# Patient Record
Sex: MALE | Race: WHITE | Employment: OTHER | URBAN - METROPOLITAN AREA
[De-identification: names, ages, dates, MRNs, and addresses within clinical notes are randomized per-mention and may not be internally consistent; named-entity substitution may affect disease eponyms.]

---

## 2017-02-15 PROBLEM — J44.1 COPD EXACERBATION (HCC): Status: ACTIVE | Noted: 2017-02-15

## 2017-06-06 ENCOUNTER — OFFICE VISIT (OUTPATIENT)
Dept: PULMONOLOGY | Age: 62
End: 2017-06-06
Payer: COMMERCIAL

## 2017-06-06 VITALS
HEART RATE: 82 BPM | OXYGEN SATURATION: 96 % | BODY MASS INDEX: 27.4 KG/M2 | HEIGHT: 69 IN | RESPIRATION RATE: 14 BRPM | SYSTOLIC BLOOD PRESSURE: 147 MMHG | WEIGHT: 185 LBS | DIASTOLIC BLOOD PRESSURE: 93 MMHG | TEMPERATURE: 97.2 F

## 2017-06-06 DIAGNOSIS — R91.1 LUNG NODULE: ICD-10-CM

## 2017-06-06 DIAGNOSIS — R06.09 DYSPNEA ON EXERTION: ICD-10-CM

## 2017-06-06 DIAGNOSIS — J30.9 ALLERGIC RHINITIS, UNSPECIFIED ALLERGIC RHINITIS TRIGGER, UNSPECIFIED RHINITIS SEASONALITY: ICD-10-CM

## 2017-06-06 DIAGNOSIS — J43.2 CENTRILOBULAR EMPHYSEMA (HCC): Primary | ICD-10-CM

## 2017-06-06 PROCEDURE — G8427 DOCREV CUR MEDS BY ELIG CLIN: HCPCS | Performed by: INTERNAL MEDICINE

## 2017-06-06 PROCEDURE — 99204 OFFICE O/P NEW MOD 45 MIN: CPT | Performed by: INTERNAL MEDICINE

## 2017-06-06 PROCEDURE — G8926 SPIRO NO PERF OR DOC: HCPCS | Performed by: INTERNAL MEDICINE

## 2017-06-06 PROCEDURE — 3023F SPIROM DOC REV: CPT | Performed by: INTERNAL MEDICINE

## 2017-06-06 PROCEDURE — 3017F COLORECTAL CA SCREEN DOC REV: CPT | Performed by: INTERNAL MEDICINE

## 2017-06-06 PROCEDURE — 1036F TOBACCO NON-USER: CPT | Performed by: INTERNAL MEDICINE

## 2017-06-06 PROCEDURE — G8419 CALC BMI OUT NRM PARAM NOF/U: HCPCS | Performed by: INTERNAL MEDICINE

## 2017-06-06 PROCEDURE — G8598 ASA/ANTIPLAT THER USED: HCPCS | Performed by: INTERNAL MEDICINE

## 2017-06-06 RX ORDER — FLUTICASONE PROPIONATE 50 MCG
2 SPRAY, SUSPENSION (ML) NASAL DAILY
Qty: 1 BOTTLE | Refills: 11 | Status: SHIPPED | OUTPATIENT
Start: 2017-06-06 | End: 2017-10-13

## 2017-06-06 RX ORDER — FLUTICASONE PROPIONATE 110 UG/1
2 AEROSOL, METERED RESPIRATORY (INHALATION) 2 TIMES DAILY
Qty: 1 INHALER | Refills: 11 | Status: SHIPPED | OUTPATIENT
Start: 2017-06-06 | End: 2017-06-12 | Stop reason: CLARIF

## 2017-06-06 RX ORDER — DILTIAZEM HYDROCHLORIDE 120 MG/1
240 CAPSULE, COATED, EXTENDED RELEASE ORAL DAILY
COMMUNITY
Start: 2016-10-07

## 2017-06-06 RX ORDER — PRASUGREL 10 MG/1
1 TABLET, FILM COATED ORAL DAILY
COMMUNITY
End: 2017-06-06 | Stop reason: SDUPTHER

## 2017-06-06 RX ORDER — ROSUVASTATIN CALCIUM 5 MG/1
1 TABLET, COATED ORAL DAILY
COMMUNITY
End: 2017-06-06 | Stop reason: SDUPTHER

## 2017-06-12 ENCOUNTER — TELEPHONE (OUTPATIENT)
Dept: PULMONOLOGY | Age: 62
End: 2017-06-12

## 2017-06-21 ENCOUNTER — HOSPITAL ENCOUNTER (OUTPATIENT)
Dept: CT IMAGING | Age: 62
Discharge: HOME | End: 2017-06-21
Payer: COMMERCIAL

## 2017-06-21 DIAGNOSIS — R91.1 LUNG NODULE: ICD-10-CM

## 2017-06-21 PROCEDURE — 71250 CT THORAX DX C-: CPT

## 2017-08-01 ENCOUNTER — OFFICE VISIT (OUTPATIENT)
Dept: PULMONOLOGY | Age: 62
End: 2017-08-01
Payer: COMMERCIAL

## 2017-08-01 VITALS
SYSTOLIC BLOOD PRESSURE: 153 MMHG | HEIGHT: 69 IN | WEIGHT: 188 LBS | BODY MASS INDEX: 27.85 KG/M2 | DIASTOLIC BLOOD PRESSURE: 97 MMHG | HEART RATE: 67 BPM | TEMPERATURE: 97.1 F | OXYGEN SATURATION: 98 % | RESPIRATION RATE: 16 BRPM

## 2017-08-01 DIAGNOSIS — R06.09 DYSPNEA ON EXERTION: ICD-10-CM

## 2017-08-01 DIAGNOSIS — R91.8 ABNORMAL CT SCAN, LUNG: ICD-10-CM

## 2017-08-01 DIAGNOSIS — J30.89 NON-SEASONAL ALLERGIC RHINITIS, UNSPECIFIED ALLERGIC RHINITIS TRIGGER: ICD-10-CM

## 2017-08-01 DIAGNOSIS — J43.2 CENTRILOBULAR EMPHYSEMA (HCC): Primary | ICD-10-CM

## 2017-08-01 PROCEDURE — 99214 OFFICE O/P EST MOD 30 MIN: CPT | Performed by: INTERNAL MEDICINE

## 2017-08-01 PROCEDURE — 3017F COLORECTAL CA SCREEN DOC REV: CPT | Performed by: INTERNAL MEDICINE

## 2017-08-01 PROCEDURE — G8926 SPIRO NO PERF OR DOC: HCPCS | Performed by: INTERNAL MEDICINE

## 2017-08-01 PROCEDURE — G8427 DOCREV CUR MEDS BY ELIG CLIN: HCPCS | Performed by: INTERNAL MEDICINE

## 2017-08-01 PROCEDURE — 1036F TOBACCO NON-USER: CPT | Performed by: INTERNAL MEDICINE

## 2017-08-01 PROCEDURE — G8419 CALC BMI OUT NRM PARAM NOF/U: HCPCS | Performed by: INTERNAL MEDICINE

## 2017-08-01 PROCEDURE — G8598 ASA/ANTIPLAT THER USED: HCPCS | Performed by: INTERNAL MEDICINE

## 2017-08-01 PROCEDURE — 3023F SPIROM DOC REV: CPT | Performed by: INTERNAL MEDICINE

## 2017-10-13 RX ORDER — FLUTICASONE PROPIONATE 50 MCG
2 SPRAY, SUSPENSION (ML) NASAL DAILY
Qty: 3 BOTTLE | Refills: 3 | OUTPATIENT
Start: 2017-10-13 | End: 2019-07-02

## 2017-10-13 NOTE — TELEPHONE ENCOUNTER
CANCELLED REFILLS AT [address] [place] [address] [facility] IS CALLING FOR REFILLS ON THIS MEDICATION AND THEY DO NOT DO TRANSFERS.  NEED NEW SCRIPT

## 2018-02-06 ENCOUNTER — TELEPHONE (OUTPATIENT)
Dept: PULMONOLOGY | Age: 63
End: 2018-02-06

## 2018-02-06 ENCOUNTER — OFFICE VISIT (OUTPATIENT)
Dept: PULMONOLOGY | Age: 63
End: 2018-02-06
Payer: COMMERCIAL

## 2018-02-06 VITALS
OXYGEN SATURATION: 97 % | TEMPERATURE: 97.3 F | RESPIRATION RATE: 18 BRPM | HEART RATE: 66 BPM | DIASTOLIC BLOOD PRESSURE: 98 MMHG | SYSTOLIC BLOOD PRESSURE: 144 MMHG | BODY MASS INDEX: 28.44 KG/M2 | WEIGHT: 192 LBS | HEIGHT: 69 IN

## 2018-02-06 DIAGNOSIS — F17.219 NICOTINE DEPENDENCE, CIGARETTES, WITH UNSPECIFIED NICOTINE-INDUCED DISORDERS: ICD-10-CM

## 2018-02-06 DIAGNOSIS — F17.211 NICOTINE DEPENDENCE, CIGARETTES, IN REMISSION: ICD-10-CM

## 2018-02-06 DIAGNOSIS — J43.2 CENTRILOBULAR EMPHYSEMA (HCC): Primary | ICD-10-CM

## 2018-02-06 DIAGNOSIS — R06.09 DYSPNEA ON EXERTION: ICD-10-CM

## 2018-02-06 DIAGNOSIS — R91.1 LUNG NODULE: ICD-10-CM

## 2018-02-06 PROCEDURE — G8926 SPIRO NO PERF OR DOC: HCPCS | Performed by: INTERNAL MEDICINE

## 2018-02-06 PROCEDURE — 1036F TOBACCO NON-USER: CPT | Performed by: INTERNAL MEDICINE

## 2018-02-06 PROCEDURE — 3017F COLORECTAL CA SCREEN DOC REV: CPT | Performed by: INTERNAL MEDICINE

## 2018-02-06 PROCEDURE — G8419 CALC BMI OUT NRM PARAM NOF/U: HCPCS | Performed by: INTERNAL MEDICINE

## 2018-02-06 PROCEDURE — G8598 ASA/ANTIPLAT THER USED: HCPCS | Performed by: INTERNAL MEDICINE

## 2018-02-06 PROCEDURE — 99214 OFFICE O/P EST MOD 30 MIN: CPT | Performed by: INTERNAL MEDICINE

## 2018-02-06 PROCEDURE — G0296 VISIT TO DETERM LDCT ELIG: HCPCS | Performed by: INTERNAL MEDICINE

## 2018-02-06 PROCEDURE — G8427 DOCREV CUR MEDS BY ELIG CLIN: HCPCS | Performed by: INTERNAL MEDICINE

## 2018-02-06 PROCEDURE — 3023F SPIROM DOC REV: CPT | Performed by: INTERNAL MEDICINE

## 2018-02-06 PROCEDURE — G8484 FLU IMMUNIZE NO ADMIN: HCPCS | Performed by: INTERNAL MEDICINE

## 2018-02-06 NOTE — PROGRESS NOTES
Laterality Date    CARDIAC CATHETERIZATION  2011, 2013    CORONARY ANGIOPLASTY WITH STENT PLACEMENT  2011    x2     CORONARY ANGIOPLASTY WITH STENT PLACEMENT  5/2013    x1    HERNIA REPAIR         Allergies: Allergies   Allergen Reactions    Pcn [Penicillins]          Home Meds:   Outpatient Encounter Prescriptions as of 2/6/2018   Medication Sig Dispense Refill    mometasone (ASMANEX HFA) 100 MCG/ACT AERO inhaler Inhale 2 puffs into the lungs 2 times daily 3 Inhaler 3    umeclidinium-vilanterol (ANORO ELLIPTA) 62.5-25 MCG/INH AEPB inhaler Inhale 1 puff into the lungs daily 3 each 3    diltiazem (CARTIA XT) 120 MG extended release capsule Take 1 tablet by mouth daily      rosuvastatin (CRESTOR) 10 MG tablet Take 10 mg by mouth daily      Probiotic Product (FORTIFY DAILY PROBIOTIC) CAPS Take 1 capsule by mouth daily      albuterol sulfate HFA (PROVENTIL HFA) 108 (90 BASE) MCG/ACT inhaler Inhale 2 puffs into the lungs every 6 hours as needed for Wheezing or Shortness of Breath 1 Inhaler 0    prasugrel (EFFIENT) 10 MG TABS Take 10 mg by mouth daily      Flaxseed, Linseed, (FLAXSEED OIL) 1000 MG CAPS Take 1 capsule by mouth daily       aspirin 81 MG tablet Take 81 mg by mouth daily.  fluticasone (FLONASE) 50 MCG/ACT nasal spray 2 sprays by Nasal route daily 3 Bottle 3    [DISCONTINUED] carvedilol (COREG) 3.125 MG tablet Take 1 tablet by mouth 2 times daily (with meals) 60 tablet 3     No facility-administered encounter medications on file as of 2/6/2018. Social History:   TOBACCO:   reports that he quit smoking in February 2017. 2 ppd for 45 years and then 1 ppd. He has never used smokeless tobacco.  ETOH:   reports that he does not drink alcohol.   OCCUPATION:      Family History:       Problem Relation Age of Onset    Heart Defect Father        Immunizations:    Immunization History   Administered Date(s) Administered    Influenza, Trivalent Vaccine 3 Years and above, IM, PF 02/16/2017  Pneumococcal 13-valent Conjugate ([id number]) 02/16/2017         REVIEW OF SYSTEMS:  CONSTITUTIONAL:  negative for  fevers, chills, sweats, fatigue, malaise, anorexia and weight loss  EYES:  negative for  double vision, blurred vision, visual disturbance and redness  HEENT:  negative for  hearing loss, tinnitus, ear drainage, epistaxis, snoring, sore mouth, hoarseness and voice change  RESPIRATORY:  Positive for dyspnea on exertion  negative for  wheezing, dry cough, cough with sputum, hemoptysis, chest pain, pleuritic pain and cyanosis  CARDIOVASCULAR:  positive for  dyspnea on exertion  negative for  chest pain, palpitations, orthopnea, PND, exertional chest pressure/discomfort, fatigue, early saiety, edema, syncope  GASTROINTESTINAL:  negative for nausea, vomiting, change in bowel habits, diarrhea, constipation, abdominal pain, abdominal distention, jaundice, dysphagia, reflux, regurgitation, odynophagia, hematemesis and hemtochezia  GENITOURINARY:  negative for frequency, dysuria, nocturia, urinary incontinence, hesitancy, decreased stream and hematuria  HEMATOLOGIC/LYMPHATIC:  negative for easy bruising, bleeding, lymphadenopathy and petechiae  ALLERGIC/IMMUNOLOGIC:  negative for recurrent infections, urticaria, hay fever, angioedema, anaphylaxis and drug reactions  ENDOCRINE:  negative for heat intolerance, cold intolerance, tremor, weight changes and change in bowel habits  MUSCULOSKELETAL:  Posterior for right heel pain, negative for  myalgias, arthralgias, pain, joint swelling, decreased range of motion and muscle weakness  NEUROLOGICAL:  negative for headaches, dizziness, seizures, memory problems, speech problems, visual disturbance, gait problems, tremor, dysphagia, weakness, numbness, syncope, near syncope and tingling  BEHAVIOR/PSYCH:  negative          Physical Exam:    Vitals: BP (!) 144/98 (Site: Left Arm, Position: Sitting)   Pulse 66   Temp 97.3 °F (36.3 °C)   Resp 18   Ht 5' 9\" (1.753 m) Final   02/15/2017 134 (L) 135 - 144 mmol/L Final     Potassium   Date Value Ref Range Status   02/17/2017 4.1 3.7 - 5.3 mmol/L Final   02/16/2017 4.1 3.7 - 5.3 mmol/L Final   02/15/2017 3.6 (L) 3.7 - 5.3 mmol/L Final     Chloride   Date Value Ref Range Status   02/17/2017 102 98 - 107 mmol/L Final   02/16/2017 99 98 - 107 mmol/L Final   02/15/2017 91 (L) 98 - 107 mmol/L Final     CO2   Date Value Ref Range Status   02/17/2017 26 20 - 31 mmol/L Final   02/16/2017 25 20 - 31 mmol/L Final   02/15/2017 23 20 - 31 mmol/L Final     BUN   Date Value Ref Range Status   02/17/2017 16 6 - 20 mg/dL Final   02/16/2017 11 6 - 20 mg/dL Final   02/15/2017 12 6 - 20 mg/dL Final     CREATININE   Date Value Ref Range Status   02/17/2017 0.76 0.70 - 1.20 mg/dL Final   02/16/2017 0.70 0.70 - 1.20 mg/dL Final   02/15/2017 0.76 0.70 - 1.20 mg/dL Final     Glucose   Date Value Ref Range Status   02/17/2017 125 (H) 70 - 99 mg/dL Final   02/16/2017 150 (H) 70 - 99 mg/dL Final   02/15/2017 166 (H) 70 - 99 mg/dL Final     Hepatic:   AST   Date Value Ref Range Status   10/23/2014 12 <40 U/L Final     Comment:     Performed at [address] [place] [address] [phone]   06/17/2014 16 <40 U/L Final     Comment:     Performed at [address] [place] [address] 3 [phone]   03/14/2014 16 8 - 36 U/L Final     Comment:     Performed at [facility] [address] 3 [phone]     ALT   Date Value Ref Range Status   10/23/2014 18 5 - 41 U/L Final     Comment:     Performed at [address] [place] [address] [phone]   06/17/2014 16 5 - 41 U/L Final     Comment:     Performed at [address] [place] [address] 3 [phone]   03/14/2014 27 4 - 40 U/L Final     Comment:     Performed at [address] [place] [address] 3 [phone]     Amylase: No results found for: AMYLASE  Lipase: No results found for:

## 2018-02-07 ENCOUNTER — TELEPHONE (OUTPATIENT)
Dept: ONCOLOGY | Age: 63
End: 2018-02-07

## 2018-06-19 RX ORDER — MOMETASONE FUROATE 100 UG/1
AEROSOL RESPIRATORY (INHALATION)
Qty: 3 INHALER | Refills: 0 | Status: SHIPPED | OUTPATIENT
Start: 2018-06-19 | End: 2018-08-07 | Stop reason: SDUPTHER

## 2018-06-26 ENCOUNTER — HOSPITAL ENCOUNTER (OUTPATIENT)
Dept: CT IMAGING | Age: 63
Discharge: HOME | End: 2018-06-28
Payer: COMMERCIAL

## 2018-06-26 PROCEDURE — G0297 LDCT FOR LUNG CA SCREEN: HCPCS

## 2018-06-29 ENCOUNTER — TELEPHONE (OUTPATIENT)
Dept: CASE MANAGEMENT | Age: 63
End: 2018-06-29

## 2018-07-23 RX ORDER — UMECLIDINIUM BROMIDE AND VILANTEROL TRIFENATATE 62.5; 25 UG/1; UG/1
POWDER RESPIRATORY (INHALATION)
Qty: 180 EACH | Refills: 3 | Status: SHIPPED | OUTPATIENT
Start: 2018-07-23 | End: 2019-12-11 | Stop reason: SDUPTHER

## 2018-08-07 ENCOUNTER — OFFICE VISIT (OUTPATIENT)
Dept: PULMONOLOGY | Age: 63
End: 2018-08-07
Payer: COMMERCIAL

## 2018-08-07 VITALS
HEIGHT: 69 IN | HEART RATE: 73 BPM | SYSTOLIC BLOOD PRESSURE: 143 MMHG | BODY MASS INDEX: 26.81 KG/M2 | RESPIRATION RATE: 14 BRPM | WEIGHT: 181 LBS | DIASTOLIC BLOOD PRESSURE: 90 MMHG | TEMPERATURE: 98 F

## 2018-08-07 DIAGNOSIS — R91.1 LUNG NODULE: ICD-10-CM

## 2018-08-07 DIAGNOSIS — J43.2 CENTRILOBULAR EMPHYSEMA (HCC): Primary | ICD-10-CM

## 2018-08-07 DIAGNOSIS — R06.09 DYSPNEA ON EXERTION: ICD-10-CM

## 2018-08-07 PROCEDURE — G8427 DOCREV CUR MEDS BY ELIG CLIN: HCPCS | Performed by: INTERNAL MEDICINE

## 2018-08-07 PROCEDURE — G8598 ASA/ANTIPLAT THER USED: HCPCS | Performed by: INTERNAL MEDICINE

## 2018-08-07 PROCEDURE — 99214 OFFICE O/P EST MOD 30 MIN: CPT | Performed by: INTERNAL MEDICINE

## 2018-08-07 PROCEDURE — 3017F COLORECTAL CA SCREEN DOC REV: CPT | Performed by: INTERNAL MEDICINE

## 2018-08-07 PROCEDURE — G8926 SPIRO NO PERF OR DOC: HCPCS | Performed by: INTERNAL MEDICINE

## 2018-08-07 PROCEDURE — 3023F SPIROM DOC REV: CPT | Performed by: INTERNAL MEDICINE

## 2018-08-07 PROCEDURE — 1036F TOBACCO NON-USER: CPT | Performed by: INTERNAL MEDICINE

## 2018-08-07 PROCEDURE — G8419 CALC BMI OUT NRM PARAM NOF/U: HCPCS | Performed by: INTERNAL MEDICINE

## 2018-08-07 NOTE — PROGRESS NOTES
ANGIOPLASTY WITH STENT PLACEMENT  2011    x2     CORONARY ANGIOPLASTY WITH STENT PLACEMENT  5/2013    x1    HERNIA REPAIR         Allergies: Allergies   Allergen Reactions    Pcn [Penicillins]          Home Meds:   Outpatient Encounter Prescriptions as of 8/7/2018   Medication Sig Dispense Refill    ANORO ELLIPTA 62.5-25 MCG/INH AEPB inhaler USE 1 INHALATION DAILY 180 each 3    ASMANEX  MCG/ACT AERO inhaler USE 2 INHALATIONS TWICE A DAY 3 Inhaler 0    diltiazem (CARTIA XT) 120 MG extended release capsule Take 1 tablet by mouth daily      rosuvastatin (CRESTOR) 10 MG tablet Take 10 mg by mouth daily      albuterol sulfate HFA (PROVENTIL HFA) 108 (90 BASE) MCG/ACT inhaler Inhale 2 puffs into the lungs every 6 hours as needed for Wheezing or Shortness of Breath 1 Inhaler 0    prasugrel (EFFIENT) 10 MG TABS Take 10 mg by mouth daily      Flaxseed, Linseed, (FLAXSEED OIL) 1000 MG CAPS Take 1 capsule by mouth daily       aspirin 81 MG tablet Take 81 mg by mouth daily.  fluticasone (FLONASE) 50 MCG/ACT nasal spray 2 sprays by Nasal route daily 3 Bottle 3    Probiotic Product (FORTIFY DAILY PROBIOTIC) CAPS Take 1 capsule by mouth daily       No facility-administered encounter medications on file as of 8/7/2018. Social History:   TOBACCO:   reports that he quit smoking in February 2017. 2 ppd for 45 years and then 1 ppd. He has never used smokeless tobacco.  ETOH:   reports that he does not drink alcohol.   OCCUPATION:      Family History:   Family History   Problem Relation Age of Onset    Heart Defect Father        Immunizations:    Immunization History   Administered Date(s) Administered    Influenza, Trivalent Vaccine 3 Years and above, IM, PF 02/16/2017    Pneumococcal 13-valent Conjugate ([id number]) 02/16/2017         REVIEW OF SYSTEMS:  CONSTITUTIONAL:  negative for  fevers, chills, sweats, fatigue, malaise, anorexia and weight loss  EYES:  negative for  double vision, blurred kg/m².     Physical Examination:   General appearance - alert, well appearing, and in no distress and normal appearing weight  Mental status - alert, oriented to person, place, and time  Eyes - sclera anicteric, left eye normal, right eye normal  Ears - right ear normal, left ear normal  Nose - normal and patent, no erythema, discharge or polyps  Mouth - mucous membranes moist, pharynx normal without lesions  Neck - supple, no significant adenopathy  Chest - no tachypnea, retractions or cyanosis, Increase resonance on percussion bilaterally, no wheezing noted, decreased air entry noted and distant breath sounds bilaterally, no rales  Heart - normal rate, regular rhythm, normal S1, S2, no murmurs, rubs, clicks or gallops  Abdomen - soft, nontender, nondistended, no masses or organomegaly  Neurological - alert, oriented, normal speech, no focal findings or movement disorder noted  Extremities - peripheral pulses normal, no pedal edema, no clubbing or cyanosis  Skin - normal coloration and turgor, no rashes, no suspicious skin lesions noted       LABS:    CBC:   WBC   Date Value Ref Range Status   02/17/2017 16.2 (H) 3.5 - 11.0 k/uL Final   02/16/2017 12.0 (H) 3.5 - 11.0 k/uL Final   02/15/2017 14.1 (H) 3.5 - 11.0 k/uL Final     Hemoglobin   Date Value Ref Range Status   02/17/2017 12.8 (L) 13.5 - 17.5 g/dL Final   02/16/2017 13.0 (L) 13.5 - 17.5 g/dL Final   02/15/2017 13.8 13.5 - 17.5 g/dL Final     Platelets   Date Value Ref Range Status   02/17/2017 444 (H) 130 - 400 k/uL Final   02/16/2017 441 (H) 130 - 400 k/uL Final   02/15/2017 415 (H) 130 - 400 k/uL Final     BMP:   Sodium   Date Value Ref Range Status   02/17/2017 139 135 - 144 mmol/L Final   02/16/2017 135 135 - 144 mmol/L Final   02/15/2017 134 (L) 135 - 144 mmol/L Final     Potassium   Date Value Ref Range Status   02/17/2017 4.1 3.7 - 5.3 mmol/L Final   02/16/2017 4.1 3.7 - 5.3 mmol/L Final   02/15/2017 3.6 (L) 3.7 - 5.3 mmol/L Final     Chloride   Date measuring 6-7 mm on image 122 of the        axial images.       Lungs and pleural spaces.       Severe upper lung predominant emphysematous changes with mild central        and lower lung bronchiectasis. EKG: . ECHO:     Immunization History   Administered Date(s) Administered    Influenza, Trivalent Vaccine 3 Years and above, IM, PF 02/16/2017    Pneumococcal 13-valent Conjugate (Lindsay Perry) 02/16/2017       Assessment and Plan       ICD-10-CM ICD-9-CM    1. Centrilobular emphysema (HCC) J43.2 492.8    2. Dyspnea on exertion R06.09 786.09    3. Lung nodule R91.1 793.11        Patient Active Problem List   Diagnosis    CAD (coronary artery disease)    Status post coronary artery stent placement    Essential hypertension    Hyperlipidemia    [facility]ierrez       Plan and Recommendations:    Continue Anoro once daily  Albuterol as needed  Flonase nasal spray to continue as needed  Asmanex mcg 2 puffs bid    Will need yearly Low dose CT scan chest next in June 2019. Annual flu vaccine  Uptodate on Flu and Pneumonia vaccine prevnar 13    Maintain an active lifestyle   Smoking cessation to continue  Follow up in 6 months     Questions answered to pt's satisfaction          It was my pleasure to evaluate Khan Will today. Please call with questions. Rosanna Rouse MD             8/7/2018, 10:10 AM       Please note that this chart was generated using voice recognition Dragon dictation software. Although every effort was made to ensure the accuracy of this automated transcription, some errors in transcription may have occurred.

## 2019-02-05 ENCOUNTER — OFFICE VISIT (OUTPATIENT)
Dept: PULMONOLOGY | Age: 64
End: 2019-02-05
Payer: COMMERCIAL

## 2019-02-05 VITALS
SYSTOLIC BLOOD PRESSURE: 147 MMHG | WEIGHT: 192 LBS | TEMPERATURE: 97.2 F | RESPIRATION RATE: 14 BRPM | HEART RATE: 68 BPM | BODY MASS INDEX: 28.44 KG/M2 | DIASTOLIC BLOOD PRESSURE: 94 MMHG | HEIGHT: 69 IN | OXYGEN SATURATION: 96 %

## 2019-02-05 DIAGNOSIS — J43.2 CENTRILOBULAR EMPHYSEMA (HCC): Primary | ICD-10-CM

## 2019-02-05 DIAGNOSIS — R06.09 DYSPNEA ON EXERTION: ICD-10-CM

## 2019-02-05 DIAGNOSIS — R91.1 LUNG NODULE: ICD-10-CM

## 2019-02-05 PROCEDURE — 99214 OFFICE O/P EST MOD 30 MIN: CPT | Performed by: INTERNAL MEDICINE

## 2019-03-11 DIAGNOSIS — J43.2 CENTRILOBULAR EMPHYSEMA (HCC): ICD-10-CM

## 2019-03-12 DIAGNOSIS — J43.2 CENTRILOBULAR EMPHYSEMA (HCC): ICD-10-CM

## 2019-03-20 ENCOUNTER — OFFICE VISIT (OUTPATIENT)
Dept: PULMONOLOGY | Age: 64
End: 2019-03-20
Payer: COMMERCIAL

## 2019-03-20 VITALS
BODY MASS INDEX: 28.14 KG/M2 | HEIGHT: 69 IN | SYSTOLIC BLOOD PRESSURE: 156 MMHG | RESPIRATION RATE: 16 BRPM | WEIGHT: 190 LBS | DIASTOLIC BLOOD PRESSURE: 99 MMHG | HEART RATE: 67 BPM | OXYGEN SATURATION: 97 %

## 2019-03-20 VITALS — HEIGHT: 69 IN | BODY MASS INDEX: 28.14 KG/M2 | OXYGEN SATURATION: 98 % | HEART RATE: 74 BPM | WEIGHT: 190 LBS

## 2019-03-20 DIAGNOSIS — J43.1 PANLOBULAR EMPHYSEMA (HCC): ICD-10-CM

## 2019-03-20 DIAGNOSIS — J44.9 CHRONIC OBSTRUCTIVE PULMONARY DISEASE, UNSPECIFIED COPD TYPE (HCC): Primary | ICD-10-CM

## 2019-03-20 DIAGNOSIS — R91.8 LUNG NODULES: Primary | ICD-10-CM

## 2019-03-20 DIAGNOSIS — J44.9 COPD, VERY SEVERE (HCC): ICD-10-CM

## 2019-03-20 PROCEDURE — 94010 BREATHING CAPACITY TEST: CPT | Performed by: INTERNAL MEDICINE

## 2019-03-20 PROCEDURE — 94729 DIFFUSING CAPACITY: CPT | Performed by: INTERNAL MEDICINE

## 2019-03-20 PROCEDURE — 94726 PLETHYSMOGRAPHY LUNG VOLUMES: CPT | Performed by: INTERNAL MEDICINE

## 2019-03-20 PROCEDURE — 99215 OFFICE O/P EST HI 40 MIN: CPT | Performed by: INTERNAL MEDICINE

## 2019-03-20 RX ORDER — LEVOFLOXACIN 500 MG/1
500 TABLET, FILM COATED ORAL DAILY
Qty: 7 TABLET | Refills: 0 | Status: SHIPPED | OUTPATIENT
Start: 2019-03-20 | End: 2019-03-27

## 2019-03-20 ASSESSMENT — SLEEP AND FATIGUE QUESTIONNAIRES
HOW LIKELY ARE YOU TO NOD OFF OR FALL ASLEEP WHEN YOU ARE A PASSENGER IN A CAR FOR AN HOUR WITHOUT A BREAK: 0
HOW LIKELY ARE YOU TO NOD OFF OR FALL ASLEEP WHILE SITTING AND READING: 0
HOW LIKELY ARE YOU TO NOD OFF OR FALL ASLEEP WHILE SITTING QUIETLY AFTER LUNCH WITHOUT ALCOHOL: 0
HOW LIKELY ARE YOU TO NOD OFF OR FALL ASLEEP WHILE WATCHING TV: 0
HOW LIKELY ARE YOU TO NOD OFF OR FALL ASLEEP WHILE LYING DOWN TO REST IN THE AFTERNOON WHEN CIRCUMSTANCES PERMIT: 3
HOW LIKELY ARE YOU TO NOD OFF OR FALL ASLEEP WHILE SITTING INACTIVE IN A PUBLIC PLACE: 0
HOW LIKELY ARE YOU TO NOD OFF OR FALL ASLEEP WHILE SITTING AND TALKING TO SOMEONE: 0
ESS TOTAL SCORE: 3
HOW LIKELY ARE YOU TO NOD OFF OR FALL ASLEEP IN A CAR, WHILE STOPPED FOR A FEW MINUTES IN TRAFFIC: 0

## 2019-04-03 DIAGNOSIS — R91.8 LUNG NODULES: ICD-10-CM

## 2019-04-09 ENCOUNTER — OFFICE VISIT (OUTPATIENT)
Dept: PULMONOLOGY | Age: 64
End: 2019-04-09
Payer: COMMERCIAL

## 2019-04-09 VITALS
RESPIRATION RATE: 16 BRPM | WEIGHT: 188 LBS | HEIGHT: 69 IN | HEART RATE: 59 BPM | SYSTOLIC BLOOD PRESSURE: 129 MMHG | BODY MASS INDEX: 27.85 KG/M2 | OXYGEN SATURATION: 98 % | DIASTOLIC BLOOD PRESSURE: 87 MMHG

## 2019-04-09 DIAGNOSIS — R91.1 LUNG NODULE: ICD-10-CM

## 2019-04-09 DIAGNOSIS — J43.2 CENTRILOBULAR EMPHYSEMA (HCC): Primary | ICD-10-CM

## 2019-04-09 PROCEDURE — 99215 OFFICE O/P EST HI 40 MIN: CPT | Performed by: INTERNAL MEDICINE

## 2019-04-09 NOTE — PROGRESS NOTES
OUTPATIENT PULMONARY PROGRESS NOTE      Patient:  Marguerite Ac  MRN: [id number]    Consulting Physician: Dr Crystal Parra  Reason for Consult: Dyspnea, COPD  Primacy Care Physician: Kendall Myers MD    HISTORY OF PRESENT ILLNESS:   The patient is a 60 y.o. male   He is here for follow-up today, he has history of severe  COPD/Emphysema and history of lung nodules/scarring which was stable for 2 years on CT scan. He had last screening CT scan done in June 2018 which showed stable 3 mm left lower lobe nodule, on his visit about 6 weeks ago a high-resolution CT chest was ordered for evaluation of lung volume reduction surgery and/or transplant evaluation and also pulmonary function test and alpha 1 antitrypsin phenotype was ordered. High-resolution CT of the chest on 03/07/19 shows right lower lobe area of nodular infiltrate 1.2 X 1.2 cm along with some bronchiectasis in that area, left upper lobe small nodule 6 mm and in the right lower lobe posterior medial nodule 4 mm which apparently was not seen on the previous CT scan. He was seen 2 weeks ago and a PET scan was ordered understanding that left upper lobe nodule was too small and could be false negative but it was not present on CT scan in June 2018 about 10 months ago and also he had a right lower lobe large area of infiltrate/atelectasis and he is here for follow-up today. PET scan was done on 04/02/19 which showed the right lower lobe area of infiltrate atelectasis is smaller and no uptake there as it is decreasing in size and not FDG avid suggestive of inflammatory origin, the left upper lobe nodule shows slight uptake although uptake was low but considering the small size of the nodule (6 mm) it was still considered concerning.     According to patient around the time he had the CT scan done on March 7 he was having upper respiratory/cold-like symptoms, his wife was sick at that time and he was having cold congestion with cough without wheezing fever or chest tightness he did not take any antibiotics and he did not use any frequent inhaler and symptoms resolved, when he was seen last time and antibiotic was given. Alpha 1 antitrypsin phenotype is PI MM  Pulmonary function test done today shows severe obstructive impairment with air trapping and severe reduction in diffusion capacity. 6 minute walk test did not show excise-induced hypoxia did require long-term oxygen therapy. GALLARDO is stable without much change, can walk half a block or more with slow pace does get short of breath with walk fast.  Denies persistent or daily cough, no wheezing no orthopnea and paroxysmal nocturnal dyspnea  No nocturnal symptoms of cough wheezing chest tightness  Taking Anoro daily and taking Asmanex once daily instead of twice daily. Does not use any albuterol as he does not think albuterol helped him. No allergies except seasonal allergies and not taking Flonase nasal sprays. In last 2 years. No exacerbation and no hospitalization no use of prednisone since 2017. Initial history and evaluation  H/O COPD diagnosed since 2011 when he had also stent placed for CAD by Dr Niko Sarkar since 2011  Stopped smoking about a year ago and he had a smoke 2 pack per day for 49-50 years  H/O LL lung nodule and had ct scan in 2014, 2015 and 2016 in [facility]      Past Medical History:        Diagnosis Date    CAD (coronary artery disease) 2011    stents    COPD (chronic obstructive pulmonary disease) ([address]     COPD exacerbation ([address] 2/15/2017    [facility]     Examination of participant in clinical trial 5/20/13    6 year follow up, estimated completion JUN 2019    Hyperlipidemia     Hypokalemia        Past Surgical History:        Procedure Laterality Date    CARDIAC CATHETERIZATION  2011, 2013    CORONARY ANGIOPLASTY WITH STENT PLACEMENT  2011    x2     CORONARY ANGIOPLASTY WITH STENT PLACEMENT  5/2013    x1    HERNIA REPAIR         Allergies:     Allergies   Allergen Reactions    Pcn [Penicillins]          Home Meds:   Outpatient Encounter Medications as of 4/9/2019   Medication Sig Dispense Refill    mometasone (ASMANEX HFA) 100 MCG/ACT AERO inhaler Inhale 2 puffs into the lungs 2 times daily 3 Inhaler 3    ANORO ELLIPTA 62.5-25 MCG/INH AEPB inhaler USE 1 INHALATION DAILY 180 each 3    diltiazem (CARTIA XT) 120 MG extended release capsule Take 1 tablet by mouth daily      rosuvastatin (CRESTOR) 10 MG tablet Take 10 mg by mouth daily      Probiotic Product (FORTIFY DAILY PROBIOTIC) CAPS Take 1 capsule by mouth daily      albuterol sulfate HFA (PROVENTIL HFA) 108 (90 BASE) MCG/ACT inhaler Inhale 2 puffs into the lungs every 6 hours as needed for Wheezing or Shortness of Breath 1 Inhaler 0    prasugrel (EFFIENT) 10 MG TABS Take 10 mg by mouth daily      Flaxseed, Linseed, (FLAXSEED OIL) 1000 MG CAPS Take 1 capsule by mouth daily       aspirin 81 MG tablet Take 81 mg by mouth daily.  fluticasone (FLONASE) 50 MCG/ACT nasal spray 2 sprays by Nasal route daily 3 Bottle 3     No facility-administered encounter medications on file as of 4/9/2019. Social History:   TOBACCO:   reports that he quit smoking in February 2017. 2 ppd for 45 years and then 1 ppd. He has never used smokeless tobacco.  ETOH:   reports that he does not drink alcohol.   OCCUPATION:      Family History:       Problem Relation Age of Onset    Heart Defect Father        Immunizations:    Immunization History   Administered Date(s) Administered    Influenza, Triv, 3 Years and older, IM, PF (Afluria 5yrs and older) 02/16/2017    Pneumococcal 13-valent Conjugate ([id number]) 02/16/2017         REVIEW OF SYSTEMS:  CONSTITUTIONAL:  negative for  fevers, chills, sweats, fatigue, malaise, anorexia and weight loss  EYES:  negative for  double vision, blurred vision, visual disturbance and redness  HEENT:  negative for  hearing loss, tinnitus, ear drainage, epistaxis, snoring, sore mouth, hoarseness and voice change  RESPIRATORY:  Positive for dyspnea on exertion, negative for  wheezing, dry cough, cough with sputum, hemoptysis, chest pain, pleuritic pain and cyanosis  CARDIOVASCULAR:  positive for  dyspnea on exertion, negative for  chest pain, palpitations, orthopnea, PND, exertional chest pressure/discomfort, fatigue, early saiety, edema, syncope  GASTROINTESTINAL:  negative for nausea, vomiting, change in bowel habits, diarrhea, constipation, abdominal pain, abdominal distention, jaundice, dysphagia, reflux, regurgitation, odynophagia, hematemesis and hemtochezia  GENITOURINARY:  negative for frequency, dysuria, nocturia, urinary incontinence, hesitancy, decreased stream and hematuria  HEMATOLOGIC/LYMPHATIC:  negative for easy bruising, bleeding, lymphadenopathy and petechiae  ALLERGIC/IMMUNOLOGIC:  negative for recurrent infections, urticaria, hay fever, angioedema, anaphylaxis and drug reactions  ENDOCRINE:  negative for heat intolerance, cold intolerance, tremor, weight changes and change in bowel habits  MUSCULOSKELETAL:  Positive for toe pain, negative for  myalgias, arthralgias, pain, joint swelling, decreased range of motion and muscle weakness  NEUROLOGICAL:  negative for headaches, dizziness, seizures, memory problems, speech problems, visual disturbance, gait problems, tremor, dysphagia, weakness, numbness, syncope, near syncope and tingling  BEHAVIOR/PSYCH:  negative          Physical Exam:    Vitals: /87 (Site: Left Upper Arm)   Pulse 59   Resp 16   Ht 5' 9\" (1.753 m)   Wt 188 lb (85.3 kg)   SpO2 98% Comment: Room air at rest  BMI 27.76 kg/m²   Last 3 weights: Wt Readings from Last 3 Encounters:   04/09/19 188 lb (85.3 kg)   03/20/19 190 lb (86.2 kg)   03/20/19 190 lb (86.2 kg)     Body mass index is 27.76 kg/m².     Physical Examination:   General appearance - alert, well appearing, and in no distress and normal appearing weight  Mental status - alert, oriented to person, place, and time  Eyes - sclera anicteric, left eye normal, right eye normal  Ears - right ear normal, left ear normal  Nose - normal and patent, no erythema, discharge or polyps  Mouth - mucous membranes moist, pharynx normal without lesions  Neck - supple, no significant adenopathy  Chest - no tachypnea, retractions or cyanosis, Increase resonance on percussion bilaterally, no wheezing noted, decreased air entry noted and distant breath sounds bilaterally, no rales  Heart - normal rate, regular rhythm, normal S1, S2, no murmurs, rubs, clicks or gallops  Abdomen - soft, nontender, nondistended, no masses or organomegaly  Neurological - alert, oriented, normal speech, no focal findings or movement disorder noted  Extremities - peripheral pulses normal, no pedal edema, no clubbing or cyanosis  Skin - normal coloration and turgor, no rashes, no suspicious skin lesions noted       LABS:    CBC:   WBC   Date Value Ref Range Status   02/17/2017 16.2 (H) 3.5 - 11.0 k/uL Final   02/16/2017 12.0 (H) 3.5 - 11.0 k/uL Final   02/15/2017 14.1 (H) 3.5 - 11.0 k/uL Final     Hemoglobin   Date Value Ref Range Status   02/17/2017 12.8 (L) 13.5 - 17.5 g/dL Final   02/16/2017 13.0 (L) 13.5 - 17.5 g/dL Final   02/15/2017 13.8 13.5 - 17.5 g/dL Final     Platelets   Date Value Ref Range Status   02/17/2017 444 (H) 130 - 400 k/uL Final   02/16/2017 441 (H) 130 - 400 k/uL Final   02/15/2017 415 (H) 130 - 400 k/uL Final     BMP:   Sodium   Date Value Ref Range Status   02/17/2017 139 135 - 144 mmol/L Final   02/16/2017 135 135 - 144 mmol/L Final   02/15/2017 134 (L) 135 - 144 mmol/L Final     Potassium   Date Value Ref Range Status   02/17/2017 4.1 3.7 - 5.3 mmol/L Final   02/16/2017 4.1 3.7 - 5.3 mmol/L Final   02/15/2017 3.6 (L) 3.7 - 5.3 mmol/L Final     Chloride   Date Value Ref Range Status   02/17/2017 102 98 - 107 mmol/L Final   02/16/2017 99 98 - 107 mmol/L Final   02/15/2017 91 (L) 98 - 107 mmol/L Final     CO2   Date Value Ref Range Status 02/17/2017 26 20 - 31 mmol/L Final   02/16/2017 25 20 - 31 mmol/L Final   02/15/2017 23 20 - 31 mmol/L Final     BUN   Date Value Ref Range Status   02/17/2017 16 6 - 20 mg/dL Final   02/16/2017 11 6 - 20 mg/dL Final   02/15/2017 12 6 - 20 mg/dL Final     CREATININE   Date Value Ref Range Status   02/17/2017 0.76 0.70 - 1.20 mg/dL Final   02/16/2017 0.70 0.70 - 1.20 mg/dL Final   02/15/2017 0.76 0.70 - 1.20 mg/dL Final     Glucose   Date Value Ref Range Status   02/17/2017 125 (H) 70 - 99 mg/dL Final   02/16/2017 150 (H) 70 - 99 mg/dL Final   02/15/2017 166 (H) 70 - 99 mg/dL Final     Hepatic:   AST   Date Value Ref Range Status   10/23/2014 12 <40 U/L Final     Comment:     Performed at [address] [place] [address], [address] [place] [address] [phone]   06/17/2014 16 <40 U/L Final     Comment:     Performed at [address] [place] [address], [facility] 3 [phone]   03/14/2014 16 8 - 36 U/L Final     Comment:     Performed at [facility] [address] [facility], [facility] 3 [phone]     ALT   Date Value Ref Range Status   10/23/2014 18 5 - 41 U/L Final     Comment:     Performed at [address] [place] [address], [address] [place] [address] [phone]   06/17/2014 16 5 - 41 U/L Final     Comment:     Performed at [address] [place] [address], [facility] 3 [phone]   03/14/2014 27 4 - 40 U/L Final     Comment:     Performed at [address] [place] [address] 3 [phone]     Amylase: No results found for: AMYLASE  Lipase: No results found for: LIPASE  CARDIAC ENZYMES:   Total CK   Date Value Ref Range Status   05/21/2013 72 38 - 174 U/L Final   05/20/2013 86 38 - 174 U/L Final   05/20/2013 99 38 - 174 U/L Final     CK-MB   Date Value Ref Range Status   05/21/2013 2.3 0 - 5 ng/mL Final   05/20/2013 3.2 0 - 5 ng/mL Final   05/20/2013 2.7 0 - 5 ng/mL Final     BNP: No results found for: BNP  Lipids:   Cholesterol   Date Value Ref Range Status   10/23/2014 170 <200 mg/dL Final     Comment:        Cholesterol Guidelines:      <200  Desirable   200-240  Borderline      >240  Undesirable          HDL   Date Value Ref Range Status   10/23/2014 46 >40 mg/dL Final     Comment:        HDL Guidelines:    <40     Undesirable   40-59    Borderline    >59     Desirable            INR: No results found for: INR  Thyroid: No results found for: TSH  Urinalysis: No results found for: BACTERIA, BLOODU, CLARITYU, COLORU, PHUR, PROTEINU, RBCUA, SPECGRAV, BILIRUBINUR, NITRU, WBCUA, LEUKOCYTESUR, GLUCOSEU  Cultures:-  -----------------------------------------------------------------    ABGs: No results found for: PHART, PO2ART, WHO8KAN    Pulmonary Functions Testing Results:    Pulmonary function test  03/20/2019  Spirometry shows FEV1 is 1.27, 38 percent predicted consistent with severe obstructive ventilatory impairment, FVC is 2.3 9, 57 percent predicted. Lung volume shows RV is 3.83 164% predicted consistent with hyperinflation and air trapping, TLC is 6.33 99% predicted which is within normal limit. DLCO is 10.77 41% predicted consistent with severe reduction in diffusion capacity   Impression: This pulmonary function test is consistent with severe obstructive ventilatory impairment with hyperinflation/airway trapping on lung volume and severe reduction in diffusion capacity is likely secondary to emphysema and/or concomitant pulmonary vascular disease.   Clinical correlation is recommended    Pulmonary function test  5/19/17 from [facility] : FEV1 0.96 27%, Post BD 36.3 31% change, FVC 2.60 58%, LFX8GYJ 48%, TLC 6.77 106%, DLCO 9.00 38%    CXR    CT SCANS CHEST    High-resolution CT chest  03/07/19   hows right lower lobe area of nodule 1.2 X 1.2 cm along with some bronchiectasis in that area, left upper lobe small nodule 6 mm and in the right lower lobe posterior medial nodule 4 mm which apparently was not seen on the previous CT scan    06/26/2018  *Stable 3 mm solid noncalcified pulmonary nodule left lower lobe. *Emphysema.       06/21/2017  No concerning nodularity is identified.  Incidental findings of advanced   pulmonary emphysema and coronary artery disease. CT scan chest from [facility]    07/07/2016  Impression:       1. Further decreased size left lower lobe pulmonary nodule which now        appears linear on coronal reconstructed images. This is felt to        represent likely benign finding.       2. New small nodule within left lower lobe also appears fairly linear        on coronal reconstructed images. This is probably also benign. Follow-       up in one years time could be obtained to document stability. 10/27/2015        New left lower lobe solid nodule measuring 6-7 mm on image 122 of the        axial images.       Lungs and pleural spaces.       Severe upper lung predominant emphysematous changes with mild central        and lower lung bronchiectasis. PET SCAN 04/3/2019  Findings:  The previously seen spiculated lesion in the posterior right lower lobe is substantially smaller than on prior study without significant uptake. The nodule in the left apex with surrounding groundglass opacity is redemonstrated.  There is only low level uptake, approximately 1 SUV, there is increased relative to background long.  Given the uptake relative to the small size, this remains concerning and follow-up chest CT is recommended. Abdomen and pelvis, neck are negative for abnormal uptake    IMPRESSION:    1.  Low-level uptake in left apical lesion while nonspecific remains concerning given its small size.  Follow-up CT recommended.   2.  There is been significant improvement in the right lower lobe opacity consistent with an inflammatory or infectious lesion         Immunization History   Administered Date(s) Administered    Influenza, Triv, 3 Years and older, IM, PF (Afluria 5yrs and older) 02/16/2017    Pneumococcal 13-valent Conjugate ([id number]) 02/16/2017     6 minute walk test 03/20/19  Distance walk 1250 feet 68% of predicted. Room air saturation at rest 98% lowest saturation after walking 6 minute was 93%. Assessment and Plan       ICD-10-CM    1. Centrilobular emphysema ([address] J43.2    2. Lung nodule R91.1        Patient Active Problem List   Diagnosis    CAD (coronary artery disease)    Status post coronary artery stent placement    Essential hypertension    Hyperlipidemia    S/P cardiac cath Patennt LAD & RCA stent 5/12/15-Dr. Shaffer Friendly and Recommendations:    CT scan in 2 months  [facility] referral    Assessment and discussion    I have reviewed the CT scan of the chest from June 2018 and high-resolution CT of the chest which was done on Mar 17, 2019. As mentioned above there is an area in right lower lobe which is nodular but there is areas of infiltrate present along with a bronchiectasis area leading into the area of nodule, there is another small nodule present in right lower lobe medially and also in left upper lobe 6 mm, he is severe bullous emphysematous changes present mostly centrilobular. Nodule in the left upper lobe will not be amenable for biopsy as is the medial right lower lobe nodule too small for PET scan and for biopsy. Regarding the area of nodule with infiltrate/bronchiectasis is likely inflammatory. As mentioned PET scan shows right lower lobe area is decreasing in size and not FDG avid, there is very low level uptake in left upper lobe nodule although it is very low uptake but size of the nodule is a small 6 mm and it is concerning for malignancy. I have discussed with the patient in detail today the findings of the CT scan previously and also high-resolution CT scan findings and PET scan finding discussed with the patient.   As I was going to refer him anyway to [facility] clinic for evaluation of lung volume reduction surgery and to initiate transplant evaluation since the left upper lobe lung nodule is found and he does have some significant severe emphysema he may be a candidate for lung volume reduction surgery with left upper lobectomy for lung nodule, patient understand and agree to go to [facility] clinic for evaluation. I discussed with him that he still need follow-up CT scan short-term for left upper lobe nodule in case he is not a candidate or does not undergo surgery. Plan:    As mentioned above referral to [facility] for evaluation of lung volume reduction surgery along with left upper lobe nodule. I have discussed with the patient that if he does not get any call from [facility] clinic in next 2-3 weeks to call our office. Schedule CT scan of the chest in 2 months  Continue Anoro once daily  Asmanex mcg 2 puffs bid  Albuterol as needed  Flonase nasal spray to continue as needed  Refused flu vaccine  Annual flu vaccine recommended  Uptodate on Pneumonia vaccine/ prevnar 13    Maintain an active lifestyle   Smoking cessation to continue  Follow up in 2 months    Questions answered to pt's satisfaction    It was my pleasure to evaluate Maryan Hardy today. Please call with questions. Yakelin Love MD             4/9/2019, 11:10 AM       Please note that this chart was generated using voice recognition Dragon dictation software. Although every effort was made to ensure the accuracy of this automated transcription, some errors in transcription may have occurred.

## 2019-04-09 NOTE — PATIENT INSTRUCTIONS
Patient is scheduled for CT scan at [facility] on 6/21 order was faxed to #[phone], patient was also given the CT order. ---Shalonda Lee    4/10/2019- [address] #[phone] got patient registered for pulmonologist, [facility] will call and let us know what they will need from us after patient chooses a provider at [facility]. Patient was given information to get his films from [facility] and [address]...- Shalonda Ray    4/11/2019- Patient called into office stating he was having trouble scheduling his appointment at [facility], I called [facility] clinic #[phone] and scheduled his appointment for 5/1/2019 @ 2:30, patient was informed again today to take his films from [facility] and [facility] clinic. Fax #[phone] with last office dictation and any labs drawn. ..--Shalonda Lee    4/23/19- [facility] called needing approval from insurance company sent to them. I have talked to [employer], they have sent me a fax with paper work to fill out. I have filled the paper work out and faxed it back to [employer] to get the appointment approved at [facility]. ...-Shalonda Lee    5/2/19-Spoke to Roque Varela with [employer], the office visit is approved. She noted she thought there was mention of CPT codes needing approval for procedures but I explained to her that we just want the patient seen (she said office visits are approved). If they are going to do any procedures, then them will need to submit that request w/CPT codes.  Trisha Muir

## 2019-04-30 NOTE — TELEPHONE ENCOUNTER
Pt called office requesting refill on Anoro- pt last seen in April, has July f/u . Anoro Rx is pending, please sign if you approve.

## 2019-05-20 ENCOUNTER — TELEPHONE (OUTPATIENT)
Dept: PULMONOLOGY | Age: 64
End: 2019-05-20

## 2019-05-20 NOTE — TELEPHONE ENCOUNTER
Patient stopped into the office, states [facility] called him the day of his appointment stating we didn't get some \"S\" test approved. We only had approval for an office visit. I told him that if insurance wont cover whatever they did, then they need to call his insurance company. I explained that we dont ask for specific test approval because we dont know what they will need. He had a full PFT on 3/20/19, not sure why they would do a Spirometry on 5/1/19 but they did. I instructed him to have them call us if they have any questions but we requested consult only. Patient voiced understanding.

## 2019-06-06 ENCOUNTER — TELEPHONE (OUTPATIENT)
Dept: CASE MANAGEMENT | Age: 64
End: 2019-06-06

## 2019-06-25 DIAGNOSIS — R91.1 LUNG NODULE: ICD-10-CM

## 2019-07-02 ENCOUNTER — OFFICE VISIT (OUTPATIENT)
Dept: PULMONOLOGY | Age: 64
End: 2019-07-02
Payer: COMMERCIAL

## 2019-07-02 VITALS
WEIGHT: 184.2 LBS | OXYGEN SATURATION: 99 % | DIASTOLIC BLOOD PRESSURE: 89 MMHG | BODY MASS INDEX: 27.28 KG/M2 | HEIGHT: 69 IN | HEART RATE: 66 BPM | SYSTOLIC BLOOD PRESSURE: 133 MMHG | RESPIRATION RATE: 12 BRPM

## 2019-07-02 DIAGNOSIS — R91.1 LUNG NODULE: ICD-10-CM

## 2019-07-02 DIAGNOSIS — J43.2 CENTRILOBULAR EMPHYSEMA (HCC): Primary | ICD-10-CM

## 2019-07-02 PROCEDURE — 99214 OFFICE O/P EST MOD 30 MIN: CPT | Performed by: INTERNAL MEDICINE

## 2019-07-02 NOTE — PROGRESS NOTES
older, IM, PF (Afluria 5yrs and older) 02/16/2017    Pneumococcal Conjugate 13-valent ([id number]) 02/16/2017         REVIEW OF SYSTEMS:  CONSTITUTIONAL:  negative for  fevers, chills, sweats, fatigue, malaise, anorexia and weight loss  EYES:  negative for  double vision, blurred vision, visual disturbance and redness  HEENT:  negative for  hearing loss, tinnitus, ear drainage, epistaxis, snoring, sore mouth, hoarseness and voice change  RESPIRATORY:  Positive for dyspnea on exertion, negative for  wheezing, dry cough, cough with sputum, hemoptysis, chest pain, pleuritic pain and cyanosis  CARDIOVASCULAR:  Positive for  dyspnea on exertion, negative for  chest pain, palpitations, orthopnea, PND, exertional chest pressure/discomfort, fatigue, early saiety, edema, syncope  GASTROINTESTINAL:  negative for nausea, vomiting, change in bowel habits, diarrhea, constipation, abdominal pain, abdominal distention, jaundice, dysphagia, reflux, regurgitation, odynophagia, hematemesis and hemtochezia  GENITOURINARY:  negative for frequency, dysuria, nocturia, urinary incontinence, hesitancy, decreased stream and hematuria  HEMATOLOGIC/LYMPHATIC:  negative for easy bruising, bleeding, lymphadenopathy and petechiae  ALLERGIC/IMMUNOLOGIC:  negative for recurrent infections, urticaria, hay fever, angioedema, anaphylaxis and drug reactions  ENDOCRINE:  negative for heat intolerance, cold intolerance, tremor, weight changes and change in bowel habits  MUSCULOSKELETAL:  Positive for toe pain, negative for  myalgias, arthralgias, pain, joint swelling, decreased range of motion and muscle weakness  NEUROLOGICAL:  negative for headaches, dizziness, seizures, memory problems, speech problems, visual disturbance, gait problems, tremor, dysphagia, weakness, numbness, syncope, near syncope and tingling  BEHAVIOR/PSYCH:  negative          Physical Exam:    Vitals: /89 (Site: Left Upper Arm, Position: Sitting, Cuff Size: Small Adult) Pulse 66   Resp 12   Ht 5' 9\" (1.753 m)   Wt 184 lb 3.2 oz (83.6 kg)   SpO2 99%   BMI 27.20 kg/m²   Last 3 weights: Wt Readings from Last 3 Encounters:   07/02/19 184 lb 3.2 oz (83.6 kg)   04/09/19 188 lb (85.3 kg)   03/20/19 190 lb (86.2 kg)     Body mass index is 27.2 kg/m².     Physical Examination:   General appearance - alert, well appearing, and in no distress and normal appearing weight  Mental status - alert, oriented to person, place, and time  Eyes - sclera anicteric, left eye normal, right eye normal  Ears - right ear normal, left ear normal  Nose - normal and patent, no erythema, discharge or polyps  Mouth - mucous membranes moist, pharynx normal without lesions  Neck - supple, no significant adenopathy  Chest - no tachypnea, retractions or cyanosis, Increase resonance on percussion bilaterally, no wheezing noted, decreased air entry noted and distant breath sounds bilaterally, no rales  Heart - normal rate, regular rhythm, normal S1, S2, no murmurs, rubs, clicks or gallops  Abdomen - soft, nontender, nondistended, no masses or organomegaly  Neurological - alert, oriented, normal speech, no focal findings or movement disorder noted  Extremities - peripheral pulses normal, no pedal edema, no clubbing or cyanosis  Skin - normal coloration and turgor, no rashes, no suspicious skin lesions noted       LABS:    CBC:   WBC   Date Value Ref Range Status   02/17/2017 16.2 (H) 3.5 - 11.0 k/uL Final   02/16/2017 12.0 (H) 3.5 - 11.0 k/uL Final   02/15/2017 14.1 (H) 3.5 - 11.0 k/uL Final     Hemoglobin   Date Value Ref Range Status   02/17/2017 12.8 (L) 13.5 - 17.5 g/dL Final   02/16/2017 13.0 (L) 13.5 - 17.5 g/dL Final   02/15/2017 13.8 13.5 - 17.5 g/dL Final     Platelets   Date Value Ref Range Status   02/17/2017 444 (H) 130 - 400 k/uL Final   02/16/2017 441 (H) 130 - 400 k/uL Final   02/15/2017 415 (H) 130 - 400 k/uL Final     BMP:   Sodium   Date Value Ref Range Status   02/17/2017 139 135 - 144 mmol/L [facility] : FEV1 0.96 27%, Post BD 36.3 31% change, FVC 2.60 58%, KYO0LLQ 48%, TLC 6.77 106%, DLCO 9.00 38%    CXR    CT SCANS CHEST    06/9/2019  6 mm left upper lobe nodule with surrounding groundglass opacity which is stable when compared to prior exam.  The other bilateral pulmonary nodules which were seen on the study of 3/7/2019 are no longer seen on the current exam and may have represented an inflammatory or infectious process which   has since resolved. Emphysematous changes and bronchiectasis which are stable when compared to prior study. High-resolution CT chest  03/07/19   hows right lower lobe area of nodule 1.2 X 1.2 cm along with some bronchiectasis in that area, left upper lobe small nodule 6 mm and in the right lower lobe posterior medial nodule 4 mm which apparently was not seen on the previous CT scan    06/26/2018  *Stable 3 mm solid noncalcified pulmonary nodule left lower lobe. *Emphysema.       06/21/2017  No concerning nodularity is identified.  Incidental findings of advanced   pulmonary emphysema and coronary artery disease. CT scan chest from [facility]    07/07/2016  Impression:       1. Further decreased size left lower lobe pulmonary nodule which now        appears linear on coronal reconstructed images. This is felt to        represent likely benign finding.       2. New small nodule within left lower lobe also appears fairly linear        on coronal reconstructed images. This is probably also benign. Follow-       up in one years time could be obtained to document stability. 10/27/2015        New left lower lobe solid nodule measuring 6-7 mm on image 122 of the        axial images.       Lungs and pleural spaces.       Severe upper lung predominant emphysematous changes with mild central        and lower lung bronchiectasis.     PET SCAN 04/3/2019  Findings:  The previously seen spiculated lesion in the posterior right lower lobe is substantially smaller than on prior study without significant uptake. The nodule in the left apex with surrounding groundglass opacity is redemonstrated.  There is only low level uptake, approximately 1 SUV, there is increased relative to background long.  Given the uptake relative to the small size, this remains concerning and follow-up chest CT is recommended. Abdomen and pelvis, neck are negative for abnormal uptake    IMPRESSION:    1.  Low-level uptake in left apical lesion while nonspecific remains concerning given its small size.  Follow-up CT recommended. 2.  There is been significant improvement in the right lower lobe opacity consistent with an inflammatory or infectious lesion         Immunization History   Administered Date(s) Administered    Influenza, Triv, 3 Years and older, IM, PF (Afluria 5yrs and older) 02/16/2017    Pneumococcal Conjugate 13-valent ([id number]) 02/16/2017     6 minute walk test 03/20/19  Distance walk 1250 feet 68% of predicted. Room air saturation at rest 98% lowest saturation after walking 6 minute was 93%. Assessment and Plan       ICD-10-CM    1. Centrilobular emphysema ([address] J43.2    2. Lung nodule R91.1        Patient Active Problem List   Diagnosis    CAD (coronary artery disease)    Essential hypertension    Hyperlipidemia    S/P cardiac cath Patennt LAD & RCA stent 5/12/15-Dr. gutierrez         Assessment and discussion    I have reviewed the CT scan of the chest from June 2018 and high-resolution CT of the chest which was done on Mar 17, 2019 and CT scan on June 6, 2019. As mentioned above right lower lobe nodule has resolved and not seen on CT scan on June 6 also there are areas of nodule in lower lobes which are not seen and likely bronchiectasis, left upper lobe nodule 6 mm stable as compared to March 2019 but it was not present in June 2018.  As mentioned PET scan in April 19 showed, there is very low level uptake in left upper lobe nodule although it is very low uptake but size of the nodule is small 6 mm and it was concerning and he was sent to [facility] clinic to evaluate for lung volume reduction surgery along with left upper lobe nodule excision but they do not think that he will benefit from lung volume reduction surgery so we will follow-up CT scan in 6 months at his 3-month. Plan:    Schedule CT scan of the chest in 5 months (6 months from CT scan on June 9)  Continue Anoro once daily  Asmanex mcg 2 puffs bid  Albuterol as needed  Flonase nasal spray to continue as needed  Refused flu vaccine  Annual flu vaccine recommended  Uptodate on Pneumonia vaccine/ prevnar 13    Maintain an active lifestyle   Smoking cessation to continue  Follow up in 6 months    Questions answered to pt's satisfaction    It was my pleasure to evaluate Lina Ibis today. Please call with questions. Cleo Baker MD             7/2/2019, 10:10 AM       Please note that this chart was generated using voice recognition Dragon dictation software. Although every effort was made to ensure the accuracy of this automated transcription, some errors in transcription may have occurred.

## 2019-07-08 ENCOUNTER — TELEPHONE (OUTPATIENT)
Dept: PULMONOLOGY | Age: 64
End: 2019-07-08

## 2019-12-04 DIAGNOSIS — R91.1 LUNG NODULE: ICD-10-CM

## 2019-12-11 ENCOUNTER — OFFICE VISIT (OUTPATIENT)
Dept: PULMONOLOGY | Age: 64
End: 2019-12-11
Payer: COMMERCIAL

## 2019-12-11 VITALS
SYSTOLIC BLOOD PRESSURE: 140 MMHG | HEIGHT: 69 IN | OXYGEN SATURATION: 99 % | HEART RATE: 65 BPM | WEIGHT: 189 LBS | RESPIRATION RATE: 16 BRPM | BODY MASS INDEX: 27.99 KG/M2 | DIASTOLIC BLOOD PRESSURE: 94 MMHG

## 2019-12-11 DIAGNOSIS — R91.1 LUNG NODULE: ICD-10-CM

## 2019-12-11 DIAGNOSIS — J43.2 CENTRILOBULAR EMPHYSEMA (HCC): Primary | ICD-10-CM

## 2019-12-11 PROCEDURE — 99214 OFFICE O/P EST MOD 30 MIN: CPT | Performed by: INTERNAL MEDICINE

## 2019-12-12 ENCOUNTER — TELEPHONE (OUTPATIENT)
Dept: CASE MANAGEMENT | Age: 64
End: 2019-12-12

## 2020-06-05 ENCOUNTER — OFFICE VISIT (OUTPATIENT)
Dept: PULMONOLOGY | Age: 65
End: 2020-06-05
Payer: COMMERCIAL

## 2020-06-05 VITALS
BODY MASS INDEX: 28.56 KG/M2 | TEMPERATURE: 97.2 F | HEART RATE: 75 BPM | RESPIRATION RATE: 20 BRPM | OXYGEN SATURATION: 97 % | WEIGHT: 192.8 LBS | HEIGHT: 69 IN | SYSTOLIC BLOOD PRESSURE: 115 MMHG | DIASTOLIC BLOOD PRESSURE: 82 MMHG

## 2020-06-05 PROCEDURE — 99214 OFFICE O/P EST MOD 30 MIN: CPT | Performed by: INTERNAL MEDICINE

## 2020-06-05 RX ORDER — FLUTICASONE FUROATE, UMECLIDINIUM BROMIDE AND VILANTEROL TRIFENATATE 100; 62.5; 25 UG/1; UG/1; UG/1
1 POWDER RESPIRATORY (INHALATION) DAILY
Qty: 3 EACH | Refills: 3 | Status: SHIPPED | OUTPATIENT
Start: 2020-06-05 | End: 2020-12-09 | Stop reason: SDUPTHER

## 2020-06-05 ASSESSMENT — SLEEP AND FATIGUE QUESTIONNAIRES
HOW LIKELY ARE YOU TO NOD OFF OR FALL ASLEEP WHILE SITTING AND READING: 2
HOW LIKELY ARE YOU TO NOD OFF OR FALL ASLEEP WHEN YOU ARE A PASSENGER IN A CAR FOR AN HOUR WITHOUT A BREAK: 2
ESS TOTAL SCORE: 10
HOW LIKELY ARE YOU TO NOD OFF OR FALL ASLEEP WHILE SITTING INACTIVE IN A PUBLIC PLACE: 0
HOW LIKELY ARE YOU TO NOD OFF OR FALL ASLEEP WHILE SITTING AND TALKING TO SOMEONE: 0
HOW LIKELY ARE YOU TO NOD OFF OR FALL ASLEEP WHILE LYING DOWN TO REST IN THE AFTERNOON WHEN CIRCUMSTANCES PERMIT: 3
HOW LIKELY ARE YOU TO NOD OFF OR FALL ASLEEP WHILE SITTING QUIETLY AFTER LUNCH WITHOUT ALCOHOL: 3
HOW LIKELY ARE YOU TO NOD OFF OR FALL ASLEEP IN A CAR, WHILE STOPPED FOR A FEW MINUTES IN TRAFFIC: 0
HOW LIKELY ARE YOU TO NOD OFF OR FALL ASLEEP WHILE WATCHING TV: 0

## 2020-06-05 NOTE — PROGRESS NOTES
volume reduction surgery especially with left upper lobe nodule. When he was seen in [facility] clinic he was clinically and symptomatically stable for many years and he was deemed not a candidate for surgery at this time and can be followed up in the future, he had a pulmonary function test done which was consistent with severe COPD without much change from previous function test which were done here in the past.  Since he was seen last time he had a CT scan of the chest which was ordered on last visit to be done in June and it was done on June 9 which showed resolution of right lower lobe spiculated nodule which was seen in March, also shows other nodular area in lower lungs which resolved and possibly bronchiectasis, left upper lobe nodule 6 mm is stable as compared to March. Previous visits/work-up  He had screening CT scan done in June 2018 which showed stable 3 mm left lower lobe nodule, in March 2019 a high-resolution CT chest was ordered for evaluation of lung volume reduction surgery and/or transplant evaluation and also pulmonary function test and alpha 1 antitrypsin phenotype was ordered. High-resolution CT of the chest on 03/07/19 shows right lower lobe area of nodular infiltrate 1.2 X 1.2 cm along with some bronchiectasis in that area, left upper lobe small nodule 6 mm and in the right lower lobe posterior medial nodule 4 mm which apparently was not seen on the previous CT scan. PET scan was done on 04/02/19 which showed the right lower lobe area of infiltrate atelectasis is smaller and no uptake there as it is decreasing in size and not FDG avid suggestive of inflammatory origin, the left upper lobe nodule shows slight uptake although uptake was low but considering the small size of the nodule (6 mm) it was still considered concerning.     Alpha 1 antitrypsin phenotype is PI MM  Pulmonary function test done last visit shows severe obstructive impairment with air trapping and severe reduction in needed  Refused flu vaccine  Annual flu vaccine recommended  Uptodate on Pneumonia vaccine/ prevnar 13    Maintain an active lifestyle   Smoking cessation to continue  Follow up in 6 months    Questions answered to pt's satisfaction    It was my pleasure to evaluate Mario Franklin today. Please call with questions. Del Elder MD             6/5/2020, 2:01 PM       Please note that this chart was generated using voice recognition Dragon dictation software. Although every effort was made to ensure the accuracy of this automated transcription, some errors in transcription may have occurred.

## 2020-12-09 ENCOUNTER — VIRTUAL VISIT (OUTPATIENT)
Dept: PULMONOLOGY | Age: 65
End: 2020-12-09
Payer: COMMERCIAL

## 2020-12-09 PROCEDURE — 99214 OFFICE O/P EST MOD 30 MIN: CPT | Performed by: INTERNAL MEDICINE

## 2020-12-09 RX ORDER — FLUTICASONE FUROATE, UMECLIDINIUM BROMIDE AND VILANTEROL TRIFENATATE 100; 62.5; 25 UG/1; UG/1; UG/1
1 POWDER RESPIRATORY (INHALATION) DAILY
Qty: 3 EACH | Refills: 11 | Status: SHIPPED | OUTPATIENT
Start: 2020-12-09 | End: 2022-02-28

## 2020-12-09 NOTE — PROGRESS NOTES
OUTPATIENT PULMONARY PROGRESS NOTE      Patient:  Lucie Ames  MRN: [id number]    Consulting Physician: Dr Ilene Severino  Reason for Consult: Dyspnea, COPD  Primacy Care Physician: Jori Guerrero MD    HISTORY OF PRESENT ILLNESS:   The patient is a 63 y.o. male   He is here for follow-up today, he has history of severe  COPD/Emphysema and history of lung nodules/scarring which was stable for 2 years on CT scan in the past.    Since he was seen last time he had not had any COPD exacerbation ER visit or antibiotic use. He does not complain of shortness of breath on regular activities he does have dyspnea on exertional activities or if he has to do strenuous activity and work or if he has to go up and down stairs. Since pandemic started he has lack of activity he had gained some weight as he is not that active. He still doing treadmill 2 miles an hour almost daily he does complain of fatigue in his arms and thighs on doing treadmill. He has only very occasional cough he denies daily or persistent cough. He does not have wheezing. He denies nocturnal awakening with cough wheezing chest tightness or shortness of breath. He does not have any sputum production denies any change in the color of the volume of the sputum. He is able to do his regular activities most of the time at home without getting shortness of breath. Last time his Anoro and Asmanex was changed to Trelegy which she is taking once daily. He denies any use of albuterol does not use albuterol for some time. He denies any loss of appetite weight loss fever night sweats or chest pain    He had CT scans for follow-up of left upper lobe lung nodule/left lower lobe nodule and new left lower lobe nodule seen in December 2019, as compared to CT scan in June 2019 the CT scan 6 months later in December 2019 did not show any change in the left upper lobe nodule but shows a new 5 mm left lower lobe nodule adjacent to the diaphragm.  CT scan chest on 06/02/20 showed no change in 6 mm SARA nodule and 4 mm LLL nodule but another 5 mm left lower lobe nodule above the diaphragm seen in December 2019 not seen any more on CT scan on 06/02/2020 and is supposed to get yearly CT scan and next one should be in June 2021    He has not had any exacerbations since 2017 no antibiotic or steroid use. He was referred in the past to [facility] for opinion regarding lung volume reduction surgery especially with left upper lobe nodule. When he was seen in [facility] he was clinically and symptomatically stable for many years and he was deemed not a candidate for surgery at this time and can be followed up in the future, he had a pulmonary function test done which was consistent with severe COPD without much change from previous function test which were done here in the past.  Since he was seen last time he had a CT scan of the chest which was ordered on last visit to be done in June and it was done on June 9 which showed resolution of right lower lobe spiculated nodule which was seen in March, also shows other nodular area in lower lungs which resolved and possibly bronchiectasis, left upper lobe nodule 6 mm is stable as compared to March. Previous visits/work-up  He had screening CT scan done in June 2018 which showed stable 3 mm left lower lobe nodule, in March 2019 a high-resolution CT chest was ordered for evaluation of lung volume reduction surgery and/or transplant evaluation and also pulmonary function test and alpha 1 antitrypsin phenotype was ordered. High-resolution CT of the chest on 03/07/19 shows right lower lobe area of nodular infiltrate 1.2 X 1.2 cm along with some bronchiectasis in that area, left upper lobe small nodule 6 mm and in the right lower lobe posterior medial nodule 4 mm which apparently was not seen on the previous CT scan.   PET scan was done on 04/02/19 which showed the right lower lobe area of infiltrate atelectasis is smaller and no uptake there as it is decreasing in size and not FDG avid suggestive of inflammatory origin, the left upper lobe nodule shows slight uptake although uptake was low but considering the small size of the nodule (6 mm) it was still considered concerning. Alpha 1 antitrypsin phenotype is PI MM  Pulmonary function test done last visit shows severe obstructive impairment with air trapping and severe reduction in diffusion capacity. 6 minute walk test did not show excise-induced hypoxia did require long-term oxygen therapy. Initial history and evaluation  H/O COPD diagnosed since 2011 when he had also stent placed for CAD by Dr Singh Breath since 2011  Stopped smoking about a year ago and he had a smoke 2 pack per day for 49-50 years  H/O LL lung nodule and had ct scan in 2014, 2015 and 2016 in [place]      Past Medical History:        Diagnosis Date    CAD (coronary artery disease) 2011    stents    Centrilobular emphysema ([address]     COPD (chronic obstructive pulmonary disease) ([address]     COPD exacerbation ([address] 2/15/2017    Emphysema of lung ([address]     Examination of participant in clinical trial 5/20/13    6 year follow up, estimated completion JUN 2019    Hyperlipidemia     Hypokalemia     Lung nodule        Past Surgical History:        Procedure Laterality Date    CARDIAC CATHETERIZATION  2011, 2013    CORONARY ANGIOPLASTY WITH STENT PLACEMENT  2011    x2     CORONARY ANGIOPLASTY WITH STENT PLACEMENT  5/2013    x1    HERNIA REPAIR         Allergies:     Allergies   Allergen Reactions    Pcn [Penicillins]          Home Meds:   Outpatient Encounter Medications as of 12/9/2020   Medication Sig Dispense Refill    fluticasone-umeclidin-vilant (TRELEGY ELLIPTA) 100-62.5-25 MCG/INH AEPB Inhale 1 puff into the lungs daily 3 each 3    diltiazem (CARTIA XT) 120 MG extended release capsule Take 240 mg by mouth daily       rosuvastatin (CRESTOR) 10 MG tablet Take 10 mg by mouth daily      albuterol sulfate HFA (PROVENTIL HFA) 108 (90 BASE) MCG/ACT inhaler Inhale 2 puffs into the lungs every 6 hours as needed for Wheezing or Shortness of Breath 1 Inhaler 0    prasugrel (EFFIENT) 10 MG TABS Take 10 mg by mouth daily      Flaxseed, Linseed, (FLAXSEED OIL) 1000 MG CAPS Take 1 capsule by mouth daily       aspirin 81 MG tablet Take 81 mg by mouth daily.  [DISCONTINUED] mometasone (ASMANEX HFA) 100 MCG/ACT AERO inhaler Inhale 2 puffs into the lungs 2 times daily (Patient not taking: Reported on 12/9/2020) 3 Inhaler 3    [DISCONTINUED] umeclidinium-vilanterol (ANORO ELLIPTA) 62.5-25 MCG/INH AEPB inhaler Inhale 1 puff into the lungs daily 3 each 3    [DISCONTINUED] umeclidinium-vilanterol (ANORO ELLIPTA) 62.5-25 MCG/INH AEPB inhaler Inhale 1 puff into the lungs daily 1 each 11    [DISCONTINUED] Probiotic Product (FORTIFY DAILY PROBIOTIC) CAPS Take 1 capsule by mouth daily       No facility-administered encounter medications on file as of 12/9/2020. Social History:   TOBACCO:   reports that he quit smoking in February 2017. 2 ppd for 45 years and then 1 ppd. He has never used smokeless tobacco.  ETOH:   reports no history of alcohol use.   OCCUPATION:      Family History:       Problem Relation Age of Onset    Heart Defect Father        Immunizations:    Immunization History   Administered Date(s) Administered    Influenza, Triv, 3 Years and older, IM, PF (Afluria 5yrs and older) 02/16/2017    Pneumococcal Conjugate 13-valent (Nalini Milan) 02/16/2017         REVIEW OF SYSTEMS:  CONSTITUTIONAL:  negative for  fevers, chills, sweats, fatigue, malaise, anorexia and weight loss  EYES:  negative for  double vision, blurred vision, visual disturbance and redness  HEENT:  negative for  hearing loss, tinnitus, ear drainage, epistaxis, snoring, sore mouth, hoarseness and voice change  RESPIRATORY:  Positive for dyspnea on exertion, negative for  wheezing, dry cough, cough with sputum, hemoptysis, chest pain, pleuritic pain and cyanosis  CARDIOVASCULAR:  Positive for  dyspnea on exertion, negative for  chest pain, palpitations, orthopnea, PND, exertional chest pressure/discomfort, fatigue, early saiety, edema, syncope  GASTROINTESTINAL:  negative for nausea, vomiting, change in bowel habits, diarrhea, constipation, abdominal pain, abdominal distention, jaundice, dysphagia, reflux, regurgitation, odynophagia, hematemesis and hemtochezia  GENITOURINARY:  Negative for frequency, dysuria, nocturia, urinary incontinence, hesitancy, decreased stream and hematuria  HEMATOLOGIC/LYMPHATIC:  negative for easy bruising, bleeding, lymphadenopathy and petechiae  ALLERGIC/IMMUNOLOGIC:  negative for recurrent infections, urticaria, hay fever, angioedema, anaphylaxis and drug reactions  ENDOCRINE:  negative for heat intolerance, cold intolerance, tremor, weight changes and change in bowel habits  MUSCULOSKELETAL: Negative for joint pain, negative for  myalgias, arthralgias, pain, joint swelling, decreased range of motion and muscle weakness  NEUROLOGICAL:  negative for headaches, dizziness, seizures, memory problems, speech problems, visual disturbance, gait problems, tremor, dysphagia, weakness, numbness, syncope, near syncope and tingling  BEHAVIOR/PSYCH:  negative          Physical Exam:    Vitals: There were no vitals taken for this visit. Last 3 weights: Wt Readings from Last 3 Encounters:   06/05/20 192 lb 12.8 oz (87.5 kg)   12/11/19 189 lb (85.7 kg)   07/02/19 184 lb 3.2 oz (83.6 kg)     There is no height or weight on file to calculate BMI.     Physical Examination:   Limited visual exam as this is a virtual visit  General appearance - alert, well appearing, and in no distress and normal appearing weight  Mental status - alert, oriented to person, place, and time  Eyes - sclera anicteric, left eye normal, right eye normal  Ears - right ear normal, left ear normal  Nose -not examined  Mouth -not examined  Neck -no visual abnormity  Chest - no tachypnea, retractions or cyanosis  Heart -not examined  Abdomen -not examined  Neurological - alert, oriented, normal speech, no focal findings or movement disorder noted  Extremities -not examined  Skin -not examined      LABS:    CBC:   WBC   Date Value Ref Range Status   02/17/2017 16.2 (H) 3.5 - 11.0 k/uL Final   02/16/2017 12.0 (H) 3.5 - 11.0 k/uL Final   02/15/2017 14.1 (H) 3.5 - 11.0 k/uL Final     Hemoglobin   Date Value Ref Range Status   02/17/2017 12.8 (L) 13.5 - 17.5 g/dL Final   02/16/2017 13.0 (L) 13.5 - 17.5 g/dL Final   02/15/2017 13.8 13.5 - 17.5 g/dL Final     Platelets   Date Value Ref Range Status   02/17/2017 444 (H) 130 - 400 k/uL Final   02/16/2017 441 (H) 130 - 400 k/uL Final   02/15/2017 415 (H) 130 - 400 k/uL Final     BMP:   Sodium   Date Value Ref Range Status   02/17/2017 139 135 - 144 mmol/L Final   02/16/2017 135 135 - 144 mmol/L Final   02/15/2017 134 (L) 135 - 144 mmol/L Final     Potassium   Date Value Ref Range Status   02/17/2017 4.1 3.7 - 5.3 mmol/L Final   02/16/2017 4.1 3.7 - 5.3 mmol/L Final   02/15/2017 3.6 (L) 3.7 - 5.3 mmol/L Final     Chloride   Date Value Ref Range Status   02/17/2017 102 98 - 107 mmol/L Final   02/16/2017 99 98 - 107 mmol/L Final   02/15/2017 91 (L) 98 - 107 mmol/L Final     CO2   Date Value Ref Range Status   02/17/2017 26 20 - 31 mmol/L Final   02/16/2017 25 20 - 31 mmol/L Final   02/15/2017 23 20 - 31 mmol/L Final     BUN   Date Value Ref Range Status   02/17/2017 16 6 - 20 mg/dL Final   02/16/2017 11 6 - 20 mg/dL Final   02/15/2017 12 6 - 20 mg/dL Final     CREATININE   Date Value Ref Range Status   02/17/2017 0.76 0.70 - 1.20 mg/dL Final   02/16/2017 0.70 0.70 - 1.20 mg/dL Final   02/15/2017 0.76 0.70 - 1.20 mg/dL Final     Glucose   Date Value Ref Range Status   02/17/2017 125 (H) 70 - 99 mg/dL Final   02/16/2017 150 (H) 70 - 99 mg/dL Final   02/15/2017 166 (H) 70 - 99 mg/dL Final     Hepatic:   AST   Date Value Ref Range Status   10/23/2014 12 <40 U/L Final     Comment:     Performed at [facility] [address] [facility], [address] [facility] [phone]   06/17/2014 16 <40 U/L Final     Comment:     Performed at [address] [place] [address] 3 [phone]   03/14/2014 16 8 - 36 U/L Final     Comment:     Performed at [facility] [id number] [place] [address], [facility] 3 [phone]     ALT   Date Value Ref Range Status   10/23/2014 18 5 - 41 U/L Final     Comment:     Performed at [address] [facility], [address] [facility] [phone]   06/17/2014 16 5 - 41 U/L Final     Comment:     Performed at [address] [place] [address] 3 [phone]   03/14/2014 27 4 - 40 U/L Final     Comment:     Performed at [address] [place] [address] 3 [phone]     Amylase: No results found for: AMYLASE  Lipase: No results found for: LIPASE  CARDIAC ENZYMES:   Total CK   Date Value Ref Range Status   05/21/2013 72 38 - 174 U/L Final   05/20/2013 86 38 - 174 U/L Final   05/20/2013 99 38 - 174 U/L Final     CK-MB   Date Value Ref Range Status   05/21/2013 2.3 0 - 5 ng/mL Final   05/20/2013 3.2 0 - 5 ng/mL Final   05/20/2013 2.7 0 - 5 ng/mL Final     BNP: No results found for: BNP  Lipids:   Cholesterol   Date Value Ref Range Status   10/23/2014 170 <200 mg/dL Final     Comment:        Cholesterol Guidelines:      <200  Desirable   200-240  Borderline      >240  Undesirable          HDL   Date Value Ref Range Status   10/23/2014 46 >40 mg/dL Final     Comment:        HDL Guidelines:    <40     Undesirable   40-59    Borderline    >59     Desirable            INR: No results found for: INR  Thyroid: No results found for: TSH  Urinalysis: No results found for: BACTERIA, BLOODU, CLARITYU, COLORU, PHUR, PROTEINU, RBCUA, SPECGRAV, BILIRUBINUR, NITRU, WBCUA, LEUKOCYTESUR, GLUCOSEU  Cultures:-  -----------------------------------------------------------------    ABGs: No results found for: PHART, PO2ART, [address]    Pulmonary Functions Testing Results:    Pulmonary function test  03/20/2019  Spirometry shows FEV1 is 1.27, 38 percent predicted consistent with severe obstructive ventilatory impairment, FVC is 2.3 9, 57 percent predicted. Lung volume shows RV is 3.83 164% predicted consistent with hyperinflation and air trapping, TLC is 6.33 99% predicted which is within normal limit. DLCO is 10.77 41% predicted consistent with severe reduction in diffusion capacity   Impression: This pulmonary function test is consistent with severe obstructive ventilatory impairment with hyperinflation/airway trapping on lung volume and severe reduction in diffusion capacity is likely secondary to emphysema and/or concomitant pulmonary vascular disease. Clinical correlation is recommended    Pulmonary function test  5/19/17 from [facility] : FEV1 0.96 27%, Post BD 36.3 31% change, FVC 2.60 58%, WHK2GPN 48%, TLC 6.77 106%, DLCO 9.00 38%    CXR    CT SCANS CHEST    06/02/2020  Emphysematous changes are seen unchanged.  A 4 mm nodule in the left lower lobe and a 6 mm nodule in the left upper lobe are stable.  A previously seen 5 mm left lower lobe nodule along the hemidiaphragm is no longer seen on the current study. 12/3/2019  *  6 mm left upper lobe nodule is unchanged since prior study.  Given recent abnormal PET uptake of this nodule, continued follow-up is recommended in 6 months with added attention to new 5 mm left lower lobe nodule at that time.    *  Chronic findings as detailed above. 06/9/2019  6 mm left upper lobe nodule with surrounding groundglass opacity which is stable when compared to prior exam.  The other bilateral pulmonary nodules which were seen on the study of 3/7/2019 are no longer seen on the current exam and may have represented an inflammatory or infectious process which   has since resolved. Emphysematous changes and bronchiectasis which are stable when compared to prior study.       High-resolution CT chest  03/07/19   hows right lower lobe area of nodule 1.2 X 1.2 cm along with some bronchiectasis in that area, left upper lobe small nodule 6 mm and in the right lower lobe posterior medial nodule 4 mm which apparently was not seen on the previous CT scan    06/26/2018  *Stable 3 mm solid noncalcified pulmonary nodule left lower lobe. *Emphysema.       06/21/2017  No concerning nodularity is identified.  Incidental findings of advanced   pulmonary emphysema and coronary artery disease. CT scan chest from [facility]    07/07/2016  Impression:       1. Further decreased size left lower lobe pulmonary nodule which now        appears linear on coronal reconstructed images. This is felt to        represent likely benign finding.       2. New small nodule within left lower lobe also appears fairly linear        on coronal reconstructed images. This is probably also benign. Follow-       up in one years time could be obtained to document stability. 10/27/2015        New left lower lobe solid nodule measuring 6-7 mm on image 122 of the        axial images.       Lungs and pleural spaces.       Severe upper lung predominant emphysematous changes with mild central        and lower lung bronchiectasis. PET SCAN 04/3/2019  Findings:  The previously seen spiculated lesion in the posterior right lower lobe is substantially smaller than on prior study without significant uptake. The nodule in the left apex with surrounding groundglass opacity is redemonstrated.  There is only low level uptake, approximately 1 SUV, there is increased relative to background long.  Given the uptake relative to the small size, this remains concerning and follow-up chest CT is recommended. Abdomen and pelvis, neck are negative for abnormal uptake    IMPRESSION:    1.  Low-level uptake in left apical lesion while nonspecific remains concerning given its small size.  Follow-up CT recommended.   2.  There is been significant improvement in the right lower lobe opacity consistent with an inflammatory or infectious lesion         Immunization History   Administered Date(s) Administered    Influenza, Triv, 3 Years and older, IM, PF (Afluria 5yrs and older) 02/16/2017    Pneumococcal Conjugate 13-valent ([id number]) 02/16/2017     6 minute walk test 03/20/19  Distance walk 1250 feet 68% of predicted. Room air saturation at rest 98% lowest saturation after walking 6 minute was 93%. Assessment and Plan       ICD-10-CM    1. Lung nodule  R91.1    2. Centrilobular emphysema ([address]  J43.2        Patient Active Problem List   Diagnosis    CAD (coronary artery disease)    Essential hypertension    Hyperlipidemia    S/P cardiac cath Patennt LAD & RCA stent 5/12/15-Dr. gutierrez         Assessment and discussion    He had CT scans for follow-up of left upper lobe lung nodule/left lower lobe nodule and new left lower lobe nodule seen in December 2019, as compared to CT scan in June 2019 the CT scan 6 months later in December 2019 did not show any change in the left upper lobe nodule but shows a new 5 mm left lower lobe nodule adjacent to the diaphragm. CT scan chest on 06/02/20 showed no change in 6 mm SARA nodule and 4 mm LLL nodule but another 5 mm left lower lobe nodule above the diaphragm seen in December 2019 not seen any more on CT scan on 06/02/2020, severe emphysematous changes present as before without much change. He had a PET scan in April 2019 which showed, there is very low level uptake in left upper lobe nodule although it is very low uptake but size of the nodule is small 6 mm   He was sent to [facility] clinic to evaluate for lung volume reduction surgery along with left upper lobe nodule excision but they do not think that he will benefit from lung volume reduction surgery so CT scan was followed in 6 months which was done in December 2019 with a stable finding as above. Plan:     Will to yearly screening CT scan as 6 mm SARA nodule 4 mm LLL is stable and LLL nodule above the diaphragm not seen on the CT scan on 06/02/20. We will schedule CT scan of the chest in first week of June 2021    Trelegy once daily to continue  Albuterol as needed  Flonase nasal spray to continue as needed  Refused flu vaccine  Annual flu vaccine recommended  Uptodate on Pneumonia vaccine/ prevnar 13    Maintain an active lifestyle   Smoking cessation to continue  Follow up in 6 months    Questions answered to pt's satisfaction    It was my pleasure to evaluate Isra Calvert today. Please call with questions. Please note that this chart was generated using voice recognition Dragon dictation software. Although every effort was made to ensure the accuracy of this automated transcription, some errors in transcription may have occurred. Makeda Sotelo MD             12/9/2020, 1:53 PM     Isra Calvert is a 63 y.o. male being evaluated by a Virtual Visit (video/telephone visit) encounter to address concerns as mentioned above. A caregiver was present when appropriate. Due to this being a TeleHealth encounter (During QLCA-08 public health emergency), evaluation of the following organ systems was limited: Vitals/Constitutional/EENT/Resp/CV/GI//MS/Neuro/Skin/Heme-Lymph-Imm. Pursuant to the emergency declaration under the [address] [place] [address] authority and the [employer] Act, this Virtual Visit was conducted with patient's (and/or legal guardian's) consent, to reduce the patient's risk of exposure to COVID-19 and provide necessary medical care. The patient (and/or legal guardian) has also been advised to contact this office for worsening conditions or problems, and seek emergency medical treatment and/or call 911 if deemed necessary.   Patient identification was verified at the start of the visit: Yes  Total time spent for this encounter: 22 minutes  Services were provided through a video/telephone synchronous discussion virtually to substitute for in-person clinic visit. Patient and provider were located at their individual locations at home and office respectively. --Matt Weiner MD on 12/9/2020 at 2:02 PM    An electronic signature was used to authenticate this note.

## 2021-06-09 DIAGNOSIS — Z87.891 HISTORY OF SMOKING AT LEAST 1 PACK PER DAY FOR AT LEAST 30 YEARS: ICD-10-CM

## 2021-06-16 ENCOUNTER — OFFICE VISIT (OUTPATIENT)
Dept: PULMONOLOGY | Age: 66
End: 2021-06-16
Payer: COMMERCIAL

## 2021-06-16 VITALS
DIASTOLIC BLOOD PRESSURE: 77 MMHG | HEIGHT: 69 IN | RESPIRATION RATE: 16 BRPM | BODY MASS INDEX: 28.85 KG/M2 | OXYGEN SATURATION: 98 % | TEMPERATURE: 97.3 F | HEART RATE: 53 BPM | WEIGHT: 194.8 LBS | SYSTOLIC BLOOD PRESSURE: 119 MMHG

## 2021-06-16 DIAGNOSIS — R91.1 LUNG NODULE: Primary | ICD-10-CM

## 2021-06-16 DIAGNOSIS — J43.2 CENTRILOBULAR EMPHYSEMA (HCC): ICD-10-CM

## 2021-06-16 DIAGNOSIS — Z87.891 HISTORY OF SMOKING AT LEAST 1 PACK PER DAY FOR AT LEAST 30 YEARS: ICD-10-CM

## 2021-06-16 PROCEDURE — 99214 OFFICE O/P EST MOD 30 MIN: CPT | Performed by: INTERNAL MEDICINE

## 2021-06-16 RX ORDER — LEVOFLOXACIN 500 MG/1
500 TABLET, FILM COATED ORAL DAILY
Qty: 10 TABLET | Refills: 0 | Status: SHIPPED | OUTPATIENT
Start: 2021-06-16 | End: 2021-06-26

## 2021-06-16 ASSESSMENT — SLEEP AND FATIGUE QUESTIONNAIRES
HOW LIKELY ARE YOU TO NOD OFF OR FALL ASLEEP WHILE SITTING AND READING: 2
HOW LIKELY ARE YOU TO NOD OFF OR FALL ASLEEP IN A CAR, WHILE STOPPED FOR A FEW MINUTES IN TRAFFIC: 0
HOW LIKELY ARE YOU TO NOD OFF OR FALL ASLEEP WHEN YOU ARE A PASSENGER IN A CAR FOR AN HOUR WITHOUT A BREAK: 0
HOW LIKELY ARE YOU TO NOD OFF OR FALL ASLEEP WHILE SITTING QUIETLY AFTER LUNCH WITHOUT ALCOHOL: 3
HOW LIKELY ARE YOU TO NOD OFF OR FALL ASLEEP WHILE SITTING AND TALKING TO SOMEONE: 0
HOW LIKELY ARE YOU TO NOD OFF OR FALL ASLEEP WHILE LYING DOWN TO REST IN THE AFTERNOON WHEN CIRCUMSTANCES PERMIT: 3
HOW LIKELY ARE YOU TO NOD OFF OR FALL ASLEEP WHILE SITTING INACTIVE IN A PUBLIC PLACE: 0
HOW LIKELY ARE YOU TO NOD OFF OR FALL ASLEEP WHILE WATCHING TV: 2
ESS TOTAL SCORE: 10

## 2021-06-16 NOTE — PROGRESS NOTES
rash joint pain edema. Denies any GI or  symptoms. Denies chest pain or hemoptysis  He is taking Trelegy once daily and he thinks Trelegy is working good. He does not take albuterol had not use albuterol for years. He did not have any exacerbation since 2017 no antibiotic or steroid use since he was seen last time. Previous CT scan chest  He had CT scans for follow-up of left upper lobe lung nodule/left lower lobe nodule and new left lower lobe nodule seen in December 2019, as compared to CT scan in June 2019 the CT scan 6 months later in December 2019 did not show any change in the left upper lobe nodule but shows a new 5 mm left lower lobe nodule adjacent to the diaphragm. CT scan chest on 06/02/20 showed no change in 6 mm SARA nodule and 4 mm LLL nodule but another 5 mm left lower lobe nodule above the diaphragm seen in December 2019 not seen any more on CT scan on 06/02/2020       [facility] was [facility] for opinion regarding lung volume reduction surgery especially with left upper lobe nodule. [facility] he was clinically and symptomatically stable for many years and he was deemed not a candidate for surgery at this time and can be followed up in the future, he had a pulmonary function test done which was consistent with severe COPD without much change from previous function test which were done here in the past.  Since he was seen last time he had a CT scan of the chest which was ordered on last visit to be done in June and it was done on June 9 which showed resolution of right lower lobe spiculated nodule which was seen in March, also shows other nodular area in lower lungs which resolved and possibly bronchiectasis, left upper lobe nodule 6 mm is stable as compared to March.     Previous visits/work-up  He had screening CT scan done in June 2018 which showed stable 3 mm left lower lobe nodule, in March 2019 a high-resolution CT chest was ordered for evaluation of lung volume reduction surgery and/or transplant evaluation and also pulmonary function test and alpha 1 antitrypsin phenotype was ordered. High-resolution CT of the chest on 03/07/19 shows right lower lobe area of nodular infiltrate 1.2 X 1.2 cm along with some bronchiectasis in that area, left upper lobe small nodule 6 mm and in the right lower lobe posterior medial nodule 4 mm which apparently was not seen on the previous CT scan. PET scan was done on 04/02/19 which showed the right lower lobe area of infiltrate atelectasis is smaller and no uptake there as it is decreasing in size and not FDG avid suggestive of inflammatory origin, the left upper lobe nodule shows slight uptake although uptake was low but considering the small size of the nodule (6 mm) it was still considered concerning. Alpha 1 antitrypsin phenotype is PI MM  Pulmonary function test done last visit shows severe obstructive impairment with air trapping and severe reduction in diffusion capacity. 6 minute walk test did not show excise-induced hypoxia did require long-term oxygen therapy.       Initial history and evaluation  H/O COPD diagnosed since 2011 when he had also stent placed for CAD by Dr Ena Quigley since 2011  Stopped smoking about a year ago and he had a smoke 2 pack per day for 49-50 years  H/O LL lung nodule and had ct scan in 2014, 2015 and 2016 in [place]      Past Medical History:        Diagnosis Date    CAD (coronary artery disease) 2011    stents    Centrilobular emphysema ([address]     COPD (chronic obstructive pulmonary disease) ([address]     COPD exacerbation ([address] 2/15/2017    Emphysema of lung ([address]     Examination of participant in clinical trial 5/20/13    6 year follow up, estimated completion JUN 2019    Hyperlipidemia     Hypokalemia     Lung nodule     Oral thrush 11/3/2016    Thrush     Tongue irritation 11/3/2016       Past Surgical History:        Procedure Laterality Date    CARDIAC CATHETERIZATION  2011, 2013    CORONARY ANGIOPLASTY WITH STENT PLACEMENT  2011    x2     CORONARY ANGIOPLASTY WITH STENT PLACEMENT  5/2013    x1    HERNIA REPAIR         Allergies: Allergies   Allergen Reactions    Pcn [Penicillins]          Home Meds:   Outpatient Encounter Medications as of 6/16/2021   Medication Sig Dispense Refill    fluticasone-umeclidin-vilant (TRELEGY ELLIPTA) 100-62.5-25 MCG/INH AEPB Inhale 1 puff into the lungs daily 3 each 11    diltiazem (CARTIA XT) 120 MG extended release capsule Take 240 mg by mouth daily       rosuvastatin (CRESTOR) 10 MG tablet Take 10 mg by mouth daily      prasugrel (EFFIENT) 10 MG TABS Take 10 mg by mouth daily      Flaxseed, Linseed, (FLAXSEED OIL) 1000 MG CAPS Take 1 capsule by mouth daily       aspirin 81 MG tablet Take 81 mg by mouth daily.  albuterol sulfate HFA (PROVENTIL HFA) 108 (90 BASE) MCG/ACT inhaler Inhale 2 puffs into the lungs every 6 hours as needed for Wheezing or Shortness of Breath (Patient not taking: Reported on 6/16/2021) 1 Inhaler 0     No facility-administered encounter medications on file as of 6/16/2021. Social History:   TOBACCO:   reports that he quit smoking in February 2017. 2 ppd for 45 years and then 1 ppd. He has never used smokeless tobacco.  ETOH:   reports no history of alcohol use.   OCCUPATION:      Family History:       Problem Relation Age of Onset    Heart Defect Father        Immunizations:    Immunization History   Administered Date(s) Administered    Influenza, Triv, 3 Years and older, IM, PF (Afluria 5yrs and older) 02/16/2017    Pneumococcal Conjugate 13-valent (Carmelia Junes) 02/16/2017         REVIEW OF SYSTEMS:  CONSTITUTIONAL:  negative for  fevers, chills, sweats, fatigue, malaise, anorexia and weight loss  EYES:  negative for  double vision, blurred vision, visual disturbance and redness  HEENT:  negative for  hearing loss, tinnitus, ear drainage, epistaxis, snoring, sore mouth, hoarseness and voice change  RESPIRATORY:  Positive for dyspnea on exertion, negative for  wheezing, dry cough, cough with sputum, hemoptysis, chest pain, pleuritic pain and cyanosis  CARDIOVASCULAR:  Positive for  dyspnea on exertion, negative for  chest pain, palpitations, orthopnea, PND, exertional chest pressure/discomfort, fatigue, early saiety, edema, syncope  GASTROINTESTINAL:  negative for nausea, vomiting, change in bowel habits, diarrhea, constipation, abdominal pain, abdominal distention, jaundice, dysphagia, reflux, regurgitation, odynophagia, hematemesis and hemtochezia  GENITOURINARY:  Negative for frequency, dysuria, nocturia, urinary incontinence, hesitancy, decreased stream and hematuria  HEMATOLOGIC/LYMPHATIC:  negative for easy bruising, bleeding, lymphadenopathy and petechiae  ALLERGIC/IMMUNOLOGIC:  negative for recurrent infections, urticaria, hay fever, angioedema, anaphylaxis and drug reactions  ENDOCRINE:  negative for heat intolerance, cold intolerance, tremor, weight changes and change in bowel habits  MUSCULOSKELETAL: Negative for joint pain, negative for  myalgias, arthralgias, pain, joint swelling, decreased range of motion and muscle weakness  NEUROLOGICAL:  negative for headaches, dizziness, seizures, memory problems, speech problems, visual disturbance, gait problems, tremor, dysphagia, weakness, numbness, syncope, near syncope and tingling  BEHAVIOR/PSYCH:  negative          Physical Exam:    Vitals: /77 (Site: Right Upper Arm, Position: Sitting, Cuff Size: Medium Adult)   Pulse 53   Temp 97.3 °F (36.3 °C) (Temporal)   Resp 16   Ht 5' 9\" (1.753 m)   Wt 194 lb 12.8 oz (88.4 kg)   SpO2 98% Comment: room air at rest  BMI 28.77 kg/m²   Last 3 weights: Wt Readings from Last 3 Encounters:   06/16/21 194 lb 12.8 oz (88.4 kg)   06/05/20 192 lb 12.8 oz (87.5 kg)   12/11/19 189 lb (85.7 kg)     Body mass index is 28.77 kg/m².     Physical Examination:   General appearance - alert, well appearing, and in no distress and normal appearing weight  Mental status - alert, oriented to person, place, and time  Eyes - sclera anicteric, left eye normal, right eye normal  Ears - right ear normal, left ear normal  Nose - normal and patent, no erythema, discharge or polyps  Mouth - mucous membranes moist, pharynx normal without lesions  Neck - supple, no significant adenopathy  Chest - no tachypnea, retractions or cyanosis, Increase resonance on percussion bilaterally, no wheezing noted, decreased air entry noted and distant breath sounds bilaterally, no rales  Heart - normal rate, regular rhythm, normal S1, S2, no murmurs, rubs, clicks or gallops  Abdomen - soft, nontender, nondistended, no masses or organomegaly  Neurological - alert, oriented, normal speech, no focal findings or movement disorder noted  Extremities - peripheral pulses normal, no pedal edema, no clubbing or cyanosis  Skin - normal coloration and turgor, no rashes, no suspicious skin lesions noted           LABS:    CBC:   WBC   Date Value Ref Range Status   02/17/2017 16.2 (H) 3.5 - 11.0 k/uL Final   02/16/2017 12.0 (H) 3.5 - 11.0 k/uL Final   02/15/2017 14.1 (H) 3.5 - 11.0 k/uL Final     Hemoglobin   Date Value Ref Range Status   02/17/2017 12.8 (L) 13.5 - 17.5 g/dL Final   02/16/2017 13.0 (L) 13.5 - 17.5 g/dL Final   02/15/2017 13.8 13.5 - 17.5 g/dL Final     Platelets   Date Value Ref Range Status   02/17/2017 444 (H) 130 - 400 k/uL Final   02/16/2017 441 (H) 130 - 400 k/uL Final   02/15/2017 415 (H) 130 - 400 k/uL Final     BMP:   Sodium   Date Value Ref Range Status   02/17/2017 139 135 - 144 mmol/L Final   02/16/2017 135 135 - 144 mmol/L Final   02/15/2017 134 (L) 135 - 144 mmol/L Final     Potassium   Date Value Ref Range Status   02/17/2017 4.1 3.7 - 5.3 mmol/L Final   02/16/2017 4.1 3.7 - 5.3 mmol/L Final   02/15/2017 3.6 (L) 3.7 - 5.3 mmol/L Final     Chloride   Date Value Ref Range Status   02/17/2017 102 98 - 107 mmol/L Final 02/16/2017 99 98 - 107 mmol/L Final   02/15/2017 91 (L) 98 - 107 mmol/L Final     CO2   Date Value Ref Range Status   02/17/2017 26 20 - 31 mmol/L Final   02/16/2017 25 20 - 31 mmol/L Final   02/15/2017 23 20 - 31 mmol/L Final     BUN   Date Value Ref Range Status   02/17/2017 16 6 - 20 mg/dL Final   02/16/2017 11 6 - 20 mg/dL Final   02/15/2017 12 6 - 20 mg/dL Final     CREATININE   Date Value Ref Range Status   02/17/2017 0.76 0.70 - 1.20 mg/dL Final   02/16/2017 0.70 0.70 - 1.20 mg/dL Final   02/15/2017 0.76 0.70 - 1.20 mg/dL Final     Glucose   Date Value Ref Range Status   02/17/2017 125 (H) 70 - 99 mg/dL Final   02/16/2017 150 (H) 70 - 99 mg/dL Final   02/15/2017 166 (H) 70 - 99 mg/dL Final     Hepatic:   AST   Date Value Ref Range Status   10/23/2014 12 <40 U/L Final     Comment:     Performed at [address] [place] [address] [phone]   06/17/2014 16 <40 U/L Final     Comment:     Performed at [address] [place] [address] 3 [phone]   03/14/2014 16 8 - 36 U/L Final     Comment:     Performed at [facility] [address] [facility], [facility] 3 [phone]     ALT   Date Value Ref Range Status   10/23/2014 18 5 - 41 U/L Final     Comment:     Performed at [address] [place] [address], [address] [place] [address] [phone]   06/17/2014 16 5 - 41 U/L Final     Comment:     Performed at [address] [place] [address] 3 [phone]   03/14/2014 27 4 - 40 U/L Final     Comment:     Performed at [address] [place] [address] 3 [phone]     Amylase: No results found for: AMYLASE  Lipase: No results found for: LIPASE  CARDIAC ENZYMES:   Total CK   Date Value Ref Range Status   05/21/2013 72 38 - 174 U/L Final   05/20/2013 86 38 - 174 U/L Final   05/20/2013 99 38 - 174 U/L Final     CK-MB   Date Value Ref Range Status   05/21/2013 2.3 0 - 5 ng/mL Final   05/20/2013 3.2 0 - 5 ng/mL Final   05/20/2013 2.7 0 - 5 ng/mL Final     BNP: No results found for: BNP  Lipids:   Cholesterol   Date Value Ref Range Status   10/23/2014 170 <200 mg/dL Final     Comment:        Cholesterol Guidelines:      <200  Desirable   200-240  Borderline      >240  Undesirable          HDL   Date Value Ref Range Status   10/23/2014 46 >40 mg/dL Final     Comment:        HDL Guidelines:    <40     Undesirable   40-59    Borderline    >59     Desirable            INR: No results found for: INR  Thyroid: No results found for: TSH  Urinalysis: No results found for: BACTERIA, BLOODU, CLARITYU, COLORU, PHUR, PROTEINU, RBCUA, SPECGRAV, BILIRUBINUR, NITRU, WBCUA, LEUKOCYTESUR, GLUCOSEU  Cultures:-  -----------------------------------------------------------------    ABGs: No results found for: PHART, PO2ART, RPW6VJC    Pulmonary Functions Testing Results:    Pulmonary function test  03/20/2019  Spirometry shows FEV1 is 1.27, 38 percent predicted consistent with severe obstructive ventilatory impairment, FVC is 2.3 9, 57 percent predicted. Lung volume shows RV is 3.83 164% predicted consistent with hyperinflation and air trapping, TLC is 6.33 99% predicted which is within normal limit. DLCO is 10.77 41% predicted consistent with severe reduction in diffusion capacity   Impression: This pulmonary function test is consistent with severe obstructive ventilatory impairment with hyperinflation/airway trapping on lung volume and severe reduction in diffusion capacity is likely secondary to emphysema and/or concomitant pulmonary vascular disease. Clinical correlation is recommended    Pulmonary function test  5/19/17 from [facility] : FEV1 0.96 27%, Post BD 36.3 31% change, FVC 2.60 58%, SPG5VJG 48%, TLC 6.77 106%, DLCO 9.00 38%    CXR    CT SCANS CHEST    CT chest 06/08/2021.   There is development of a right lower lobe nodular opacity with surrounding groundglass opacity.  The solid component measures 1.5 x 0.8 cm.  Development of an additional right upper lobe ill-defined nodular opacity with more subtle surrounding groundglass opacity.  This measures approximately 1.8 x    0.6 cm.  Stable 6 mm left upper lobe and 4 mm left lower lobe pulmonary nodules. Lungs and pleural spaces:     Extensive centrilobular emphysematous changes.  No pleural effusion or pneumothorax.  Diffuse bilateral peribronchial thickening and bilateral bronchiectasis. 06/02/2020  Emphysematous changes are seen unchanged.  A 4 mm nodule in the left lower lobe and a 6 mm nodule in the left upper lobe are stable.  A previously seen 5 mm left lower lobe nodule along the hemidiaphragm is no longer seen on the current study. 12/3/2019  *  6 mm left upper lobe nodule is unchanged since prior study.  Given recent abnormal PET uptake of this nodule, continued follow-up is recommended in 6 months with added attention to new 5 mm left lower lobe nodule at that time.    *  Chronic findings as detailed above. 06/9/2019  6 mm left upper lobe nodule with surrounding groundglass opacity which is stable when compared to prior exam.  The other bilateral pulmonary nodules which were seen on the study of 3/7/2019 are no longer seen on the current exam and may have represented an inflammatory or infectious process which   has since resolved. Emphysematous changes and bronchiectasis which are stable when compared to prior study. High-resolution CT chest  03/07/19   hows right lower lobe area of nodule 1.2 X 1.2 cm along with some bronchiectasis in that area, left upper lobe small nodule 6 mm and in the right lower lobe posterior medial nodule 4 mm which apparently was not seen on the previous CT scan    06/26/2018  *Stable 3 mm solid noncalcified pulmonary nodule left lower lobe. *Emphysema.       06/21/2017  No concerning nodularity is identified.  Incidental findings of advanced   pulmonary emphysema and coronary artery disease.         CT scan [facility]    07/07/2016  Impression:       1. Further decreased size left lower lobe pulmonary nodule which now        appears linear on coronal reconstructed images. This is felt to        represent likely benign finding.       2. New small nodule within left lower lobe also appears fairly linear        on coronal reconstructed images. This is probably also benign. Follow-       up in one years time could be obtained to document stability. 10/27/2015        New left lower lobe solid nodule measuring 6-7 mm on image 122 of the        axial images.       Lungs and pleural spaces.       Severe upper lung predominant emphysematous changes with mild central        and lower lung bronchiectasis. PET SCAN 04/3/2019  Findings:  The previously seen spiculated lesion in the posterior right lower lobe is substantially smaller than on prior study without significant uptake. The nodule in the left apex with surrounding groundglass opacity is redemonstrated.  There is only low level uptake, approximately 1 SUV, there is increased relative to background long.  Given the uptake relative to the small size, this remains concerning and follow-up chest CT is recommended. Abdomen and pelvis, neck are negative for abnormal uptake    IMPRESSION:    1.  Low-level uptake in left apical lesion while nonspecific remains concerning given its small size.  Follow-up CT recommended. 2.  There is been significant improvement in the right lower lobe opacity consistent with an inflammatory or infectious lesion         Immunization History   Administered Date(s) Administered    Influenza, Triv, 3 Years and older, IM, PF (Afluria 5yrs and older) 02/16/2017    Pneumococcal Conjugate 13-valent ([id number]) 02/16/2017     6 minute walk test 03/20/19  Distance walk 1250 feet 68% of predicted. Room air saturation at rest 98% lowest saturation after walking 6 minute was 93%. Assessment and Plan       ICD-10-CM    1. Lung nodule  R91.1    2.  Centrilobular emphysema ([address]  J43.2 3. History of smoking at least 1 pack per day for at least 30 years  Z87.891        Patient Active Problem List   Diagnosis    CAD (coronary artery disease)    Essential hypertension    Hyperlipidemia    S/P cardiac cath Patennt LAD & RCA stent 5/12/15-Dr. gutierrez         Assessment and discussion    CT scan of the chest on 06/08/2021 compared with CT scan of the chest on 06/02/2020 showed new areas of opacities 1 in right lower lobe as described above when [facility] component of about 1.5 cm in surrounding groundglass area. There is another in right upper lobe opacity which is more longitudinal and 1.8 cm in length but only 8 mm in the center. He is completely asymptomatic at this time he did return from [place] about 5 weeks ago but he had no complaint in [place] also. Discussed with him in detail and images reviewed with him and recommended to get a PET/CT scan in 4 weeks and if PET scan is positive even if it is negative but abnormal to persist he will need a CT-guided biopsy of likely right lower lobe nodule/infiltrate/opacity as malignancy cannot be ruled out. The meantime will give him levofloxacin for 7 to 10 days    For last 2 years he had CT scans for follow-up of left upper lobe lung nodule/left lower lobe nodule and new left lower lobe nodule seen in December 2019, as compared to CT scan in June 2019 the CT scan 6 months later in December 2019 did not show any change in the left upper lobe nodule but shows a new 5 mm left lower lobe nodule adjacent to the diaphragm. CT scan chest on 06/02/20 showed no change in 6 mm SRAA nodule and 4 mm LLL nodule but another 5 mm left lower lobe nodule above the diaphragm seen in December 2019 not seen any more on CT scan on 06/02/2020, severe emphysematous changes present as before without much change.   He had a PET scan in April 2019 which showed, there is very low level uptake in left upper lobe nodule although it is very low uptake but size of the nodule is small 6 mm   He was sent to [facility] to evaluate for lung volume reduction surgery along with left upper lobe nodule excision but they do not think that he will benefit from lung volume reduction surgery. Plan:    He is completely asymptomatic at this time and those areas were not present year ago the right lower lobe opacity is a solid central component and groundglass surrounding area and also there is another area laterally in the right upper lobe. We will get PET/CT scan in 4 weeks in the meantime we will give him antibiotic for 1 week. I have discussed with him about CT scan finding in detail and if PET scan showed areas are FDG avid especially the right lower lobe or if on the PET/CT scan area persist then he will need CT-guided biopsy of the right lower lobe nodule. Trelegy once daily to continue  Albuterol as needed  Flonase nasal spray to continue as needed  Had both the doses of covid vacciine   Refused flu vaccine  Annual flu vaccine recommended  Uptodate on Pneumonia vaccine/ prevnar 13    Maintain an active lifestyle   Smoking cessation to continue  Follow up in 4 to 5 weeks    Questions answered to pt's satisfaction    It was my pleasure to evaluate Yosef Childress today. Please call with questions. Please note that this chart was generated using voice recognition Dragon dictation software. Although every effort was made to ensure the accuracy of this automated transcription, some errors in transcription may have occurred.     Shilpi Rizo MD, MD             6/16/2021, 3:01 PM

## 2021-06-16 NOTE — PATIENT INSTRUCTIONS
PET scan scheduled at [facility] 7/16/21 -[address] [place] - 3:00 pm- arrive by 2:30 to main registration.    You will receive a call from PET dept to discuss instructions prior to test.   Ls  FAXED ORDER, OFFICE NOTE & CT REPORT AND PREV PET  LS

## 2021-07-26 DIAGNOSIS — R91.1 LUNG NODULE: ICD-10-CM

## 2021-08-06 ENCOUNTER — OFFICE VISIT (OUTPATIENT)
Dept: PULMONOLOGY | Age: 66
End: 2021-08-06
Payer: COMMERCIAL

## 2021-08-06 VITALS
DIASTOLIC BLOOD PRESSURE: 77 MMHG | HEART RATE: 58 BPM | TEMPERATURE: 97.4 F | HEIGHT: 69 IN | RESPIRATION RATE: 16 BRPM | OXYGEN SATURATION: 98 % | BODY MASS INDEX: 28.26 KG/M2 | SYSTOLIC BLOOD PRESSURE: 115 MMHG | WEIGHT: 190.8 LBS

## 2021-08-06 DIAGNOSIS — J43.2 CENTRILOBULAR EMPHYSEMA (HCC): ICD-10-CM

## 2021-08-06 DIAGNOSIS — Z87.891 HISTORY OF SMOKING AT LEAST 1 PACK PER DAY FOR AT LEAST 30 YEARS: ICD-10-CM

## 2021-08-06 DIAGNOSIS — R91.8 LUNG NODULES: Primary | ICD-10-CM

## 2021-08-06 PROCEDURE — 99214 OFFICE O/P EST MOD 30 MIN: CPT | Performed by: INTERNAL MEDICINE

## 2021-08-06 ASSESSMENT — SLEEP AND FATIGUE QUESTIONNAIRES
HOW LIKELY ARE YOU TO NOD OFF OR FALL ASLEEP IN A CAR, WHILE STOPPED FOR A FEW MINUTES IN TRAFFIC: 0
ESS TOTAL SCORE: 4
HOW LIKELY ARE YOU TO NOD OFF OR FALL ASLEEP WHILE SITTING INACTIVE IN A PUBLIC PLACE: 0
HOW LIKELY ARE YOU TO NOD OFF OR FALL ASLEEP WHILE WATCHING TV: 0
HOW LIKELY ARE YOU TO NOD OFF OR FALL ASLEEP WHEN YOU ARE A PASSENGER IN A CAR FOR AN HOUR WITHOUT A BREAK: 0
HOW LIKELY ARE YOU TO NOD OFF OR FALL ASLEEP WHILE SITTING AND TALKING TO SOMEONE: 1
HOW LIKELY ARE YOU TO NOD OFF OR FALL ASLEEP WHILE SITTING QUIETLY AFTER LUNCH WITHOUT ALCOHOL: 0
HOW LIKELY ARE YOU TO NOD OFF OR FALL ASLEEP WHILE LYING DOWN TO REST IN THE AFTERNOON WHEN CIRCUMSTANCES PERMIT: 3
HOW LIKELY ARE YOU TO NOD OFF OR FALL ASLEEP WHILE SITTING AND READING: 0

## 2021-08-06 NOTE — PROGRESS NOTES
OUTPATIENT PULMONARY PROGRESS NOTE      Patient:  Bruno Oconnell  MRN: [id number]    Consulting Physician: Dr Sarah Menjivar  Reason for Consult: Dyspnea, COPD  Primacy Care Physician: Danyel Emmanuel MD    HISTORY OF PRESENT ILLNESS:   The patient is a 61 y.o. male   He is here for follow-up today, he has history of severe  COPD/Emphysema and history of lung nodules/scarring which was stable for 2 years on CT scan in the past.    He is a scheduled for follow-up today after the PET scan as a CT scan of the chest on 06/08/2021 showed new right upper lobe and new right lower lobe infiltrate with surrounding inflammatory changes. Patient was scheduled to have PET scan done and follow-up. Last time when he was seen antibiotic were ordered for possible infectious inflammatory etiology of the nodule/infiltrate  PET CT was done on 07/26/2021 in [address] [place] [address] facility no images available for me but according to detailed CT report previously seen 1.8 cm right upper lobe nodule and 1.5 cm right lower lobe nodule with inflammatory changes had resolved. Previously seen left lung nodules 4 mm and 6 mm right stable. According to patient he is been feeling in his usual state of health. When it is hot and humid he has slightly more shortness of breath. He is able to do his regular activities. He thinks he can walk a mile although at a slower pace. He is not complaining of any increasing shortness of breath except exertional activity. He does not complain of increasing cough and denies daily or persistent cough. He denies any sputum production except occasionally denies change in the color of the sputum or volume of the sputum. He is able  He denies nocturnal awakening with cough wheezing chest tightness or shortness of breath. His appetite is same and he denies any weight loss and denies any weight gain. He is taking Trelegy once daily.   Symptoms have been stable on Trelegy he does not usually take albuterol and had not use albuterol for long time. CT scan was done on 06/08/2021 in [facility] and which showed 2 areas 1 in right lower lobe with a solid central component and surrounding groundglass infiltrate posteriorly. There is another area in right upper lobe just close to the fissure laterally which is more of a longitudinal area of about 1.5 cm about 8 mm. These areas were not present on the CT scan in June 2020 and were new since then. Previous CT scan chest  He had CT scans for follow-up of left upper lobe lung nodule/left lower lobe nodule and new left lower lobe nodule seen in December 2019, as compared to CT scan in June 2019 the CT scan 6 months later in December 2019 did not show any change in the left upper lobe nodule but shows a new 5 mm left lower lobe nodule adjacent to the diaphragm. CT scan chest on 06/02/20 showed no change in 6 mm SARA nodule and 4 mm LLL nodule but another 5 mm left lower lobe nodule above the diaphragm seen in December 2019 not seen any more on CT scan on 06/02/2020       He was referred in the past to [facility] for opinion regarding lung volume reduction surgery especially with left upper lobe nodule. When he was seen in [facility] clinic he was clinically and symptomatically stable for many years and he was deemed not a candidate for surgery at this time and can be followed up in the future, he had a pulmonary function test done which was consistent with severe COPD without much change from previous function test which were done here in the past.  Since he was seen last time he had a CT scan of the chest which was ordered on last visit to be done in June and it was done on June 9 which showed resolution of right lower lobe spiculated nodule which was seen in March, also shows other nodular area in lower lungs which resolved and possibly bronchiectasis, left upper lobe nodule 6 mm is stable as compared to March.     Previous visits/work-up  He had screening CT scan done in June 2018 which showed stable 3 mm left lower lobe nodule, in March 2019 a high-resolution CT chest was ordered for evaluation of lung volume reduction surgery and/or transplant evaluation and also pulmonary function test and alpha 1 antitrypsin phenotype was ordered. High-resolution CT of the chest on 03/07/19 shows right lower lobe area of nodular infiltrate 1.2 X 1.2 cm along with some bronchiectasis in that area, left upper lobe small nodule 6 mm and in the right lower lobe posterior medial nodule 4 mm which apparently was not seen on the previous CT scan. PET scan was done on 04/02/19 which showed the right lower lobe area of infiltrate atelectasis is smaller and no uptake there as it is decreasing in size and not FDG avid suggestive of inflammatory origin, the left upper lobe nodule shows slight uptake although uptake was low but considering the small size of the nodule (6 mm) it was still considered concerning. Alpha 1 antitrypsin phenotype is PI MM  Pulmonary function test done last visit shows severe obstructive impairment with air trapping and severe reduction in diffusion capacity. 6 minute walk test did not show excise-induced hypoxia did require long-term oxygen therapy.       Initial history and evaluation  H/O COPD diagnosed since 2011 when he had also stent placed for CAD by Dr Aristeo Servin since 2011  Stopped smoking about a year ago and he had a smoke 2 pack per day for 49-50 years  H/O LL lung nodule and had ct scan in 2014, 2015 and 2016 in [place]      Past Medical History:        Diagnosis Date    CAD (coronary artery disease) 2011    stents    Centrilobular emphysema ([address]     COPD (chronic obstructive pulmonary disease) ([address]     COPD exacerbation ([address] 2/15/2017    Emphysema of lung ([address]     Examination of participant in clinical trial 5/20/13    6 year follow up, estimated completion JUN 2019    Hyperlipidemia     Hypokalemia     Lung nodule     Oral thrush 11/3/2016    Thrush     Tongue irritation 11/3/2016       Past Surgical History:        Procedure Laterality Date    CARDIAC CATHETERIZATION  2011, 2013    CORONARY ANGIOPLASTY WITH STENT PLACEMENT  2011    x2     CORONARY ANGIOPLASTY WITH STENT PLACEMENT  5/2013    x1    HERNIA REPAIR         Allergies: Allergies   Allergen Reactions    Pcn [Penicillins]          Home Meds:   Outpatient Encounter Medications as of 8/6/2021   Medication Sig Dispense Refill    fluticasone-umeclidin-vilant (TRELEGY ELLIPTA) 100-62.5-25 MCG/INH AEPB Inhale 1 puff into the lungs daily 3 each 11    diltiazem (CARTIA XT) 120 MG extended release capsule Take 240 mg by mouth daily       rosuvastatin (CRESTOR) 10 MG tablet Take 10 mg by mouth daily      prasugrel (EFFIENT) 10 MG TABS Take 10 mg by mouth daily      aspirin 81 MG tablet Take 81 mg by mouth daily.  albuterol sulfate HFA (PROVENTIL HFA) 108 (90 BASE) MCG/ACT inhaler Inhale 2 puffs into the lungs every 6 hours as needed for Wheezing or Shortness of Breath (Patient not taking: Reported on 8/6/2021) 1 Inhaler 0    Flaxseed, Linseed, (FLAXSEED OIL) 1000 MG CAPS Take 1 capsule by mouth daily  (Patient not taking: Reported on 8/6/2021)       No facility-administered encounter medications on file as of 8/6/2021. Social History:   TOBACCO:   reports that he quit smoking in February 2017. 2 ppd for 45 years and then 1 ppd. He has never used smokeless tobacco.  ETOH:   reports no history of alcohol use.   OCCUPATION:      Family History:       Problem Relation Age of Onset    Heart Defect Father        Immunizations:    Immunization History   Administered Date(s) Administered    Influenza, Triv, 3 Years and older, IM, PF (Afluria 5yrs and older) 02/16/2017    Pneumococcal Conjugate 13-valent ([id number]) 02/16/2017         REVIEW OF SYSTEMS:  CONSTITUTIONAL:  negative for  fevers, chills, sweats, fatigue, malaise, anorexia and weight loss  EYES:  negative for  double vision, blurred vision, visual disturbance and redness  HEENT:  negative for  hearing loss, tinnitus, ear drainage, epistaxis, snoring, sore mouth, hoarseness and voice change  RESPIRATORY:  Positive for dyspnea on exertion, negative for  wheezing, dry cough, cough with sputum, hemoptysis, chest pain, pleuritic pain and cyanosis  CARDIOVASCULAR:  Positive for  dyspnea on exertion, negative for  chest pain, palpitations, orthopnea, PND, exertional chest pressure/discomfort, fatigue, early saiety, edema, syncope  GASTROINTESTINAL:  negative for nausea, vomiting, change in bowel habits, diarrhea, constipation, abdominal pain, abdominal distention, jaundice, dysphagia, reflux, regurgitation, odynophagia, hematemesis and hemtochezia  GENITOURINARY:  Negative for frequency, dysuria, nocturia, urinary incontinence, hesitancy, decreased stream and hematuria  HEMATOLOGIC/LYMPHATIC:  negative for easy bruising, bleeding, lymphadenopathy and petechiae  ALLERGIC/IMMUNOLOGIC:  negative for recurrent infections, urticaria, hay fever, angioedema, anaphylaxis and drug reactions  ENDOCRINE:  negative for heat intolerance, cold intolerance, tremor, weight changes and change in bowel habits  MUSCULOSKELETAL: Negative for joint pain, negative for  myalgias, arthralgias, pain, joint swelling, decreased range of motion and muscle weakness  NEUROLOGICAL:  negative for headaches, dizziness, seizures, memory problems, speech problems, visual disturbance, gait problems, tremor, dysphagia, weakness, numbness, syncope, near syncope and tingling  BEHAVIOR/PSYCH:  negative          Physical Exam:    Vitals: /77 (Site: Left Upper Arm, Position: Sitting, Cuff Size: Medium Adult)   Pulse 58   Temp 97.4 °F (36.3 °C) (Temporal)   Resp 16   Ht 5' 9\" (1.753 m)   Wt 190 lb 12.8 oz (86.5 kg)   SpO2 98% Comment: room air at rest  BMI 28.18 kg/m²   Last 3 weights:    Wt Readings from Last 3 Encounters:   08/06/21 190 lb 12.8 oz (86.5 kg)   06/16/21 194 lb 12.8 oz (88.4 kg)   06/05/20 192 lb 12.8 oz (87.5 kg)     Body mass index is 28.18 kg/m².     Physical Examination:   General appearance - alert, well appearing, and in no distress and normal appearing weight  Mental status - alert, oriented to person, place, and time  Eyes - sclera anicteric, left eye normal, right eye normal  Ears - right ear normal, left ear normal  Nose - normal and patent, no erythema, discharge or polyps  Mouth - mucous membranes moist, pharynx normal without lesions  Neck - supple, no significant adenopathy  Chest - no tachypnea, retractions or cyanosis, Increase resonance on percussion bilaterally, no wheezing noted, decreased air entry noted and distant breath sounds bilaterally, no rales  Heart - normal rate, regular rhythm, normal S1, S2, no murmurs, rubs, clicks or gallops  Abdomen - soft, nontender, nondistended, no masses or organomegaly  Neurological - alert, oriented, normal speech, no focal findings or movement disorder noted  Extremities - peripheral pulses normal, no pedal edema, no clubbing or cyanosis  Skin - normal coloration and turgor, no rashes, no suspicious skin lesions noted           LABS:    CBC:   WBC   Date Value Ref Range Status   02/17/2017 16.2 (H) 3.5 - 11.0 k/uL Final   02/16/2017 12.0 (H) 3.5 - 11.0 k/uL Final   02/15/2017 14.1 (H) 3.5 - 11.0 k/uL Final     Hemoglobin   Date Value Ref Range Status   02/17/2017 12.8 (L) 13.5 - 17.5 g/dL Final   02/16/2017 13.0 (L) 13.5 - 17.5 g/dL Final   02/15/2017 13.8 13.5 - 17.5 g/dL Final     Platelets   Date Value Ref Range Status   02/17/2017 444 (H) 130 - 400 k/uL Final   02/16/2017 441 (H) 130 - 400 k/uL Final   02/15/2017 415 (H) 130 - 400 k/uL Final     BMP:   Sodium   Date Value Ref Range Status   02/17/2017 139 135 - 144 mmol/L Final   02/16/2017 135 135 - 144 mmol/L Final   02/15/2017 134 (L) 135 - 144 mmol/L Final     Potassium   Date Value Ref Range Status   02/17/2017 4.1 3.7 - 5.3 mmol/L Final   02/16/2017 4.1 3.7 - 5.3 mmol/L Final   02/15/2017 3.6 (L) 3.7 - 5.3 mmol/L Final     Chloride   Date Value Ref Range Status   02/17/2017 102 98 - 107 mmol/L Final   02/16/2017 99 98 - 107 mmol/L Final   02/15/2017 91 (L) 98 - 107 mmol/L Final     CO2   Date Value Ref Range Status   02/17/2017 26 20 - 31 mmol/L Final   02/16/2017 25 20 - 31 mmol/L Final   02/15/2017 23 20 - 31 mmol/L Final     BUN   Date Value Ref Range Status   02/17/2017 16 6 - 20 mg/dL Final   02/16/2017 11 6 - 20 mg/dL Final   02/15/2017 12 6 - 20 mg/dL Final     CREATININE   Date Value Ref Range Status   02/17/2017 0.76 0.70 - 1.20 mg/dL Final   02/16/2017 0.70 0.70 - 1.20 mg/dL Final   02/15/2017 0.76 0.70 - 1.20 mg/dL Final     Glucose   Date Value Ref Range Status   02/17/2017 125 (H) 70 - 99 mg/dL Final   02/16/2017 150 (H) 70 - 99 mg/dL Final   02/15/2017 166 (H) 70 - 99 mg/dL Final     Hepatic:   AST   Date Value Ref Range Status   10/23/2014 12 <40 U/L Final     Comment:     Performed at [address] [place] [address] [phone]   06/17/2014 16 <40 U/L Final     Comment:     Performed at [address] [place] [address] 3 [phone]   03/14/2014 16 8 - 36 U/L Final     Comment:     Performed at [facility] [address] [facility], [facility] [phone]     ALT   Date Value Ref Range Status   10/23/2014 18 5 - 41 U/L Final     Comment:     Performed at [address] [place] [address], [address] [place] [address] [phone]   06/17/2014 16 5 - 41 U/L Final     Comment:     Performed at [address] [place] [address], [facility] [phone]   03/14/2014 27 4 - 40 U/L Final     Comment:     Performed at [address] [place], [place] [address]eo 3 [phone]     Amylase: No results found for: AMYLASE  Lipase: No results found for: LIPASE  CARDIAC ENZYMES:   Total CK   Date Value Ref Range Status   05/21/2013 72 38 - 174 U/L Final   05/20/2013 86 38 - 174 U/L Final   05/20/2013 99 38 - 174 U/L Final CK-MB   Date Value Ref Range Status   05/21/2013 2.3 0 - 5 ng/mL Final   05/20/2013 3.2 0 - 5 ng/mL Final   05/20/2013 2.7 0 - 5 ng/mL Final     BNP: No results found for: BNP  Lipids:   Cholesterol   Date Value Ref Range Status   10/23/2014 170 <200 mg/dL Final     Comment:        Cholesterol Guidelines:      <200  Desirable   200-240  Borderline      >240  Undesirable          HDL   Date Value Ref Range Status   10/23/2014 46 >40 mg/dL Final     Comment:        HDL Guidelines:    <40     Undesirable   40-59    Borderline    >59     Desirable            INR: No results found for: INR  Thyroid: No results found for: TSH  Urinalysis: No results found for: BACTERIA, BLOODU, CLARITYU, COLORU, PHUR, PROTEINU, RBCUA, SPECGRAV, BILIRUBINUR, NITRU, WBCUA, LEUKOCYTESUR, GLUCOSEU  Cultures:-  -----------------------------------------------------------------    ABGs: No results found for: PHART, PO2ART, [id number]    Pulmonary Functions Testing Results:    Pulmonary function test  03/20/2019  Spirometry shows FEV1 is 1.27, 38 percent predicted consistent with severe obstructive ventilatory impairment, FVC is 2.3 9, 57 percent predicted. Lung volume shows RV is 3.83 164% predicted consistent with hyperinflation and air trapping, TLC is 6.33 99% predicted which is within normal limit. DLCO is 10.77 41% predicted consistent with severe reduction in diffusion capacity   Impression: This pulmonary function test is consistent with severe obstructive ventilatory impairment with hyperinflation/airway trapping on lung volume and severe reduction in diffusion capacity is likely secondary to emphysema and/or concomitant pulmonary vascular disease. Clinical correlation is recommended    Pulmonary function [facility]  [facility] : FEV1 0.96 27%, Post BD 36.3 31% change, FVC 2.60 58%, [id number] 48%, TLC 6.77 106%, DLCO 9.00 38%    CXR    CT SCANS CHEST    CT chest 06/08/2021.   There is development of a right lower lobe nodular opacity with surrounding groundglass opacity.  The solid component measures 1.5 x 0.8 cm.  Development of an additional right upper lobe ill-defined nodular opacity with more subtle surrounding groundglass opacity.  This measures approximately 1.8 x    0.6 cm.  Stable 6 mm left upper lobe and 4 mm left lower lobe pulmonary nodules. Lungs and pleural spaces:     Extensive centrilobular emphysematous changes.  No pleural effusion or pneumothorax.  Diffuse bilateral peribronchial thickening and bilateral bronchiectasis. 06/02/2020  Emphysematous changes are seen unchanged.  A 4 mm nodule in the left lower lobe and a 6 mm nodule in the left upper lobe are stable.  A previously seen 5 mm left lower lobe nodule along the hemidiaphragm is no longer seen on the current study. 12/3/2019  *  6 mm left upper lobe nodule is unchanged since prior study.  Given recent abnormal PET uptake of this nodule, continued follow-up is recommended in 6 months with added attention to new 5 mm left lower lobe nodule at that time.    *  Chronic findings as detailed above. 06/9/2019  6 mm left upper lobe nodule with surrounding groundglass opacity which is stable when compared to prior exam.  The other bilateral pulmonary nodules which were seen on the study of 3/7/2019 are no longer seen on the current exam and may have represented an inflammatory or infectious process which   has since resolved. Emphysematous changes and bronchiectasis which are stable when compared to prior study. High-resolution CT chest  03/07/19   hows right lower lobe area of nodule 1.2 X 1.2 cm along with some bronchiectasis in that area, left upper lobe small nodule 6 mm and in the right lower lobe posterior medial nodule 4 mm which apparently was not seen on the previous CT scan    06/26/2018  *Stable 3 mm solid noncalcified pulmonary nodule left lower lobe.    *Emphysema.       06/21/2017  No concerning nodularity is identified.  Incidental findings of advanced   pulmonary emphysema and coronary artery disease. CT scan chest from [facility]    07/07/2016  Impression:       1. Further decreased size left lower lobe pulmonary nodule which now        appears linear on coronal reconstructed images. This is felt to        represent likely benign finding.       2. New small nodule within left lower lobe also appears fairly linear        on coronal reconstructed images. This is probably also benign. Follow-       up in one years time could be obtained to document stability. 10/27/2015        New left lower lobe solid nodule measuring 6-7 mm on image 122 of the        axial images.       Lungs and pleural spaces.       Severe upper lung predominant emphysematous changes with mild central        and lower lung bronchiectasis. PET/CT scan 07/26/2021. [facility] facility report available. IMPRESSION:   *  Recently seen new right upper lobe and right lower lobe pulmonary nodules demonstrate resolution and near complete resolution, respectively, as compared to recent CT chest dated 6/8/2021. [place] is no corresponding hypermetabolic activity. Rupinder Bud findings are compatible with benign inflammatory/infectious process. *  Stable left upper lobe and left lower lobe subcentimeter pulmonary nodules without corresponding hypermetabolic activity.  Follow-up CT chest recommended in one year. PET SCAN 04/3/2019  Findings:  The previously seen spiculated lesion in the posterior right lower lobe is substantially smaller than on prior study without significant uptake. The nodule in the left apex with surrounding groundglass opacity is redemonstrated.  There is only low level uptake, approximately 1 SUV, there is increased relative to background long.  Given the uptake relative to the small size, this remains concerning and follow-up chest CT is recommended.     Abdomen and pelvis, neck are negative for abnormal uptake    IMPRESSION:    1.  Low-level uptake in left apical lesion while nonspecific remains concerning given its small size.  Follow-up CT recommended. 2.  There is been significant improvement in the right lower lobe opacity consistent with an inflammatory or infectious lesion         Immunization History   Administered Date(s) Administered    Influenza, Triv, 3 Years and older, IM, PF (Afluria 5yrs and older) 02/16/2017    Pneumococcal Conjugate 13-valent ([id number]) 02/16/2017     6 minute walk test 03/20/19  Distance walk 1250 feet 68% of predicted. Room air saturation at rest 98% lowest saturation after walking 6 minute was 93%. Assessment and Plan       ICD-10-CM    1. Lung nodules  R91.8    2. Centrilobular emphysema ([address]  J43.2    3. History of smoking at least 1 pack per day for at least 30 years  Z87.891        Patient Active Problem List   Diagnosis    CAD (coronary artery disease)    Essential hypertension    Hyperlipidemia    S/P cardiac cath Patennt LAD & RCA stent 5/12/15-Dr. gutierrez         Assessment and discussion    CT scan of the chest on 06/08/2021 compared with CT scan of the chest on 06/02/2020 showed new areas of opacities 1 in right lower lobe as described above when central [facility] component of about 1.5 cm in surrounding groundglass area. There is another in right upper lobe opacity which is more longitudinal and 1.8 cm in length but only 8 mm in the center. PET scan done on 07/26/2021 showed per report resolution of both the nodule and surrounding infiltrate and there was no other abnormal uptake previously seen left upper lobe and left lower lobe nodule 6 mm and 4 mm had been stable. He will go back to low-dose screening CT scan once a year as previously next one should be in end of July 2022. He was sent to [facility], [facility] clinic to evaluate for lung volume reduction surgery but they do not think that he will benefit from lung volume reduction surgery.       Plan:    PET scan reviewed shows resolution of right upper lobe and right lower lobe nodule. He will need yearly low-dose screening CT scan next one should be fourth week of July 2022  We will get the images of PET scan from [facility] and will review but radiology report is available. Trelegy once daily to continue  Albuterol as needed  Flonase nasal spray to continue as needed  Had both the doses of covid vacciine   Refused flu vaccine  Annual flu vaccine recommended  He had covid vaccine  Uptodate on Pneumonia vaccine/ prevnar 13    Maintain an active lifestyle   Smoking cessation to continue    Follow up in 6 months    Questions answered to pt's satisfaction    It was my pleasure to evaluate Isra Calvert today. Please call with questions. Please note that this chart was generated using voice recognition Dragon dictation software. Although every effort was made to ensure the accuracy of this automated transcription, some errors in transcription may have occurred. Makeda Sotelo MD, MD             8/6/2021, 1:08 PM     I received the images from [facility] of PET scan done on 07/26/2021 and PET/CT shows resolution of right upper lobe nodule with groundglass changes around and also almost complete resolution of right lower lobe nodule and surrounding infiltrate.   There was no PET activity seen in those areas

## 2021-08-06 NOTE — PATIENT INSTRUCTIONS
8/6/21 - Per Dr Shanelle Lackey he would like to see patient back in the office in six months - JAYDEN

## 2022-02-18 ENCOUNTER — OFFICE VISIT (OUTPATIENT)
Dept: PULMONOLOGY | Age: 67
End: 2022-02-18
Payer: COMMERCIAL

## 2022-02-18 VITALS
BODY MASS INDEX: 29.03 KG/M2 | HEART RATE: 73 BPM | SYSTOLIC BLOOD PRESSURE: 118 MMHG | RESPIRATION RATE: 20 BRPM | TEMPERATURE: 97.8 F | DIASTOLIC BLOOD PRESSURE: 77 MMHG | HEIGHT: 69 IN | WEIGHT: 196 LBS | OXYGEN SATURATION: 95 %

## 2022-02-18 DIAGNOSIS — Z87.891 HISTORY OF SMOKING AT LEAST 1 PACK PER DAY FOR AT LEAST 30 YEARS: ICD-10-CM

## 2022-02-18 DIAGNOSIS — R91.8 LUNG NODULES: Primary | ICD-10-CM

## 2022-02-18 DIAGNOSIS — Z87.891 PERSONAL HISTORY OF TOBACCO USE: ICD-10-CM

## 2022-02-18 DIAGNOSIS — J43.2 CENTRILOBULAR EMPHYSEMA (HCC): ICD-10-CM

## 2022-02-18 PROCEDURE — G0296 VISIT TO DETERM LDCT ELIG: HCPCS | Performed by: INTERNAL MEDICINE

## 2022-02-18 PROCEDURE — 99214 OFFICE O/P EST MOD 30 MIN: CPT | Performed by: INTERNAL MEDICINE

## 2022-02-18 ASSESSMENT — SLEEP AND FATIGUE QUESTIONNAIRES
HOW LIKELY ARE YOU TO NOD OFF OR FALL ASLEEP WHILE WATCHING TV: 0
HOW LIKELY ARE YOU TO NOD OFF OR FALL ASLEEP WHEN YOU ARE A PASSENGER IN A CAR FOR AN HOUR WITHOUT A BREAK: 0
HOW LIKELY ARE YOU TO NOD OFF OR FALL ASLEEP WHILE SITTING QUIETLY AFTER LUNCH WITHOUT ALCOHOL: 1
HOW LIKELY ARE YOU TO NOD OFF OR FALL ASLEEP WHILE SITTING AND TALKING TO SOMEONE: 0
HOW LIKELY ARE YOU TO NOD OFF OR FALL ASLEEP IN A CAR, WHILE STOPPED FOR A FEW MINUTES IN TRAFFIC: 0
HOW LIKELY ARE YOU TO NOD OFF OR FALL ASLEEP WHILE SITTING AND READING: 0
HOW LIKELY ARE YOU TO NOD OFF OR FALL ASLEEP WHILE LYING DOWN TO REST IN THE AFTERNOON WHEN CIRCUMSTANCES PERMIT: 1
HOW LIKELY ARE YOU TO NOD OFF OR FALL ASLEEP WHILE SITTING INACTIVE IN A PUBLIC PLACE: 0
ESS TOTAL SCORE: 2

## 2022-02-18 NOTE — PATIENT INSTRUCTIONS
What is lung cancer screening? Lung cancer screening is a way in which doctors check the lungs for early signs of cancer in people who have no symptoms of lung cancer. A low-dose CT scan uses much less radiation than a normal CT scan and shows a more detailed image of the lungs than a standard X-ray. The goal of lung cancer screening is to find cancer early, before it has a chance to grow, spread, or cause problems. One large study found that smokers who were screened with low-dose CT scans were less likely to die of lung cancer than those who were screened with standard X-ray. Below is a summary of the things you need to know regarding screening for lung cancer with low-dose computed tomography (LDCT). This is a screening program that involves routine annual screening with LDCT studies of the lung. The LDCTs are done using low-dose radiation that is not thought to increase your cancer risk. If you have other serious medical conditions (other cancers, congestive heart failure) that limit your life expectancy to less than 10 years, you should not undergo lung cancer screening with LDCT. The chance is 20%-60% that the LDCT result will show abnormalities. This would require additional testing which could include repeat imaging or even invasive procedures. Most (about 95%) of \"abnormal\" LDCT results are false in the sense that no lung cancer is ultimately found. Additionally, some (about 10%) of the cancers found would not affect your life expectancy, even if undetected and untreated. If you are still smoking, the single most important thing that you can do to reduce your risk of dying of lung cancer is to quit. For this screening to be covered by Medicare and most other insurers, strict criteria must be met. If you do not meet these criteria, but still wish to undergo LDCT testing, you will be required to sign a waiver indicating your willingness to pay for the scan.     [id number] [facility] - July 1, 2022. 1:30pm arrival. [place] in P-1, Entrance A. Faxed order to #[phone].  Kathrin Leyden

## 2022-02-18 NOTE — LETTER
[facility], [address] [place] [id number]  Phone: [phone]  Fax: [phone]    Macario Mckeon MD    February 18, 2022     Frank Fonseca, [address] [place] [address]    Patient: Concepcion Aguirre   MR Number: [id number]   YOB: 1955   Date of Visit: 2/18/2022       Dear Frank Fonseca:    Thank you for referring Concepcion Aguirre to me for evaluation/treatment. Below are the relevant portions of my assessment and plan of care. If you have questions, please do not hesitate to call me. I look forward to following Javier Borden along with you.     Sincerely,      Macario Mckeon MD

## 2022-02-18 NOTE — PROGRESS NOTES
OUTPATIENT PULMONARY PROGRESS NOTE      Patient:  Chad Thacker  MRN: [id number]    Consulting Physician: Dr Barbie Clements  Reason for Consult: Dyspnea, COPD  Primacy Care Physician: Silvano Cowden, MD    HISTORY OF PRESENT ILLNESS:   The patient is a 61 y.o. male   He is here for follow-up today, he has history of severe  COPD/Emphysema and history of lung nodules/scarring which was stable for 2 years on CT scan in the past.    Since he was seen last time he did not have any exacerbation no steroid and antibiotic use. According patient he had gained weight in winter and he has slightly more shortness of breath dyspnea on exertion. He is able to do regular activities at home including taking shower walking inside the house but when he has to walk outside carry the garbage and has to walk fast especially when it is cold he gets more short of breath. He does not complain of cough he has only occasional sputum when he is sputum it is usually clear and white. He does not complain of wheezing he denies nocturnal awakening with cough wheezing chest tightness or shortness of breath. He does not have orthopnea PND or pedal edema denies chest pain fever hemoptysis. He is taking Trelegy once daily and he does not use albuterol has not use albuterol for long time. He was referred for pulmonary rehabilitation in the past but he stopped as he wanted to do exercises at home. Previous work-up in 2021  He had a PET scan on 01/26/2021 as a CT scan of the chest on 06/08/2021 showed new right upper lobe and new right lower lobe infiltrate with surrounding inflammatory changes. PET CT was done on 07/26/2021 in [facility] facility according to detailed CT report previously seen 1.8 cm right upper lobe nodule and 1.5 cm right lower lobe nodule with inflammatory changes had resolved on PET/CT. Previously seen left lung nodules 4 mm and 6 mm right stable.   CT scan was done on 06/08/2021 in [address] [place] [address] and which showed 2 areas 1 in right lower lobe with a solid central component and surrounding groundglass infiltrate posteriorly. There is another area in right upper lobe just close to the fissure laterally which is more of a longitudinal area of about 1.5 cm about 8 mm. These areas were not present on the CT scan in June 2020 and were new since then. Previous CT scan chest  He had CT scans for follow-up of left upper lobe lung nodule/left lower lobe nodule and new left lower lobe nodule seen in December 2019, as compared to CT scan in June 2019 the CT scan 6 months later in December 2019 did not show any change in the left upper lobe nodule but shows a new 5 mm left lower lobe nodule adjacent to the diaphragm. CT scan chest on 06/02/20 showed no change in 6 mm SARA nodule and 4 mm LLL nodule but another 5 mm left lower lobe nodule above the diaphragm seen in December 2019 not seen any more on CT scan on 06/02/2020       He was referred in the past to [facility] clinic for opinion regarding lung volume reduction surgery especially with left upper lobe nodule. When he was seen in [facility] clinic he was clinically and symptomatically stable for many years and he was deemed not a candidate for surgery at this time and can be followed up in the future, he had a pulmonary function test done which was consistent with severe COPD without much change from previous function test which were done here in the past.  Since he was seen last time he had a CT scan of the chest which was ordered on last visit to be done in June and it was done on June 9 which showed resolution of right lower lobe spiculated nodule which was seen in March, also shows other nodular area in lower lungs which resolved and possibly bronchiectasis, left upper lobe nodule 6 mm is stable as compared to March.     Previous visits/work-up  He had screening CT scan done in June 2018 which showed stable 3 mm left lower lobe nodule, in March 2019 a high-resolution CT chest was ordered for evaluation of lung volume reduction surgery and/or transplant evaluation and also pulmonary function test and alpha 1 antitrypsin phenotype was ordered. High-resolution CT of the chest on 03/07/19 shows right lower lobe area of nodular infiltrate 1.2 X 1.2 cm along with some bronchiectasis in that area, left upper lobe small nodule 6 mm and in the right lower lobe posterior medial nodule 4 mm which apparently was not seen on the previous CT scan. PET scan was done on 04/02/19 which showed the right lower lobe area of infiltrate atelectasis is smaller and no uptake there as it is decreasing in size and not FDG avid suggestive of inflammatory origin, the left upper lobe nodule shows slight uptake although uptake was low but considering the small size of the nodule (6 mm) it was still considered concerning. Alpha 1 antitrypsin phenotype is PI MM  Pulmonary function test done last visit shows severe obstructive impairment with air trapping and severe reduction in diffusion capacity. 6 minute walk test did not show excise-induced hypoxia did require long-term oxygen therapy.       Initial history and evaluation  H/O COPD diagnosed since 2011 when he had also stent placed for CAD by Dr Chuy Allan since 2011  Stopped smoking about a year ago and he had a smoke 2 pack per day for 49-50 years  H/O LL lung nodule and had ct scan in 2014, 2015 and 2016 in [place]      Past Medical History:        Diagnosis Date    CAD (coronary artery disease) 2011    stents    Centrilobular emphysema ([address]     COPD (chronic obstructive pulmonary disease) ([address]     COPD exacerbation ([address] 2/15/2017    Emphysema of lung ([address]     Examination of participant in clinical trial 5/20/13    6 year follow up, estimated completion JUN 2019    Hyperlipidemia     Hypokalemia     Lung nodule     Oral thrush 11/3/2016    Thrush     Tongue irritation 11/3/2016       Past Surgical History:        Procedure Laterality Date    CARDIAC CATHETERIZATION  2011, 2013    CORONARY ANGIOPLASTY WITH STENT PLACEMENT  2011    x2     CORONARY ANGIOPLASTY WITH STENT PLACEMENT  5/2013    x1    HERNIA REPAIR         Allergies: Allergies   Allergen Reactions    Pcn [Penicillins]          Home Meds:   Outpatient Encounter Medications as of 2/18/2022   Medication Sig Dispense Refill    metoprolol tartrate (LOPRESSOR) 25 MG tablet Take 25 mg by mouth 2 times daily      metFORMIN (GLUCOPHAGE) 500 MG tablet Take 500 mg by mouth 2 times daily (with meals)      fluticasone-umeclidin-vilant (TRELEGY ELLIPTA) 100-62.5-25 MCG/INH AEPB Inhale 1 puff into the lungs daily 3 each 11    diltiazem (CARTIA XT) 120 MG extended release capsule Take 240 mg by mouth daily       rosuvastatin (CRESTOR) 10 MG tablet Take 10 mg by mouth daily      albuterol sulfate HFA (PROVENTIL HFA) 108 (90 BASE) MCG/ACT inhaler Inhale 2 puffs into the lungs every 6 hours as needed for Wheezing or Shortness of Breath 1 Inhaler 0    prasugrel (EFFIENT) 10 MG TABS Take 10 mg by mouth daily      Flaxseed, Linseed, (FLAXSEED OIL) 1000 MG CAPS Take 1 capsule by mouth daily       aspirin 81 MG tablet Take 81 mg by mouth daily. No facility-administered encounter medications on file as of 2/18/2022. Social History:   TOBACCO:   reports that he quit smoking in February 2017. 2 ppd for 45 years and then 1 ppd. He has never used smokeless tobacco.  ETOH:   reports no history of alcohol use.   OCCUPATION:      Family History:       Problem Relation Age of Onset    Heart Defect Father        Immunizations:    Immunization History   Administered Date(s) Administered    Influenza, Triv, 3 Years and older, IM, PF (Afluria 5yrs and older) 02/16/2017    Pneumococcal Conjugate 13-valent ([id number]) 02/16/2017         REVIEW OF SYSTEMS:  CONSTITUTIONAL:  negative for  fevers, chills, sweats, fatigue, malaise, anorexia and weight loss  EYES:  negative for  double vision, blurred vision, visual disturbance and redness  HEENT:  negative for  hearing loss, tinnitus, ear drainage, epistaxis, snoring, sore mouth, hoarseness and voice change  RESPIRATORY:  Positive for dyspnea on exertion, negative for  wheezing, dry cough, cough with sputum, hemoptysis, chest pain, pleuritic pain and cyanosis  CARDIOVASCULAR:  Positive for  dyspnea on exertion, negative for  chest pain, palpitations, orthopnea, PND, exertional chest pressure/discomfort, fatigue, early saiety, edema, syncope  GASTROINTESTINAL:  negative for nausea, vomiting, change in bowel habits, diarrhea, constipation, abdominal pain, abdominal distention, jaundice, dysphagia, reflux, regurgitation, odynophagia, hematemesis and hemtochezia  GENITOURINARY:  Negative for frequency, dysuria, nocturia, urinary incontinence, hesitancy, decreased stream and hematuria  HEMATOLOGIC/LYMPHATIC:  negative for easy bruising, bleeding, lymphadenopathy and petechiae  ALLERGIC/IMMUNOLOGIC:  negative for recurrent infections, urticaria, hay fever, angioedema, anaphylaxis and drug reactions  ENDOCRINE:  negative for heat intolerance, cold intolerance, tremor, weight changes and change in bowel habits  MUSCULOSKELETAL: Negative for joint pain, negative for  myalgias, arthralgias, pain, joint swelling, decreased range of motion and muscle weakness  NEUROLOGICAL:  negative for headaches, dizziness, seizures, memory problems, speech problems, visual disturbance, gait problems, tremor, dysphagia, weakness, numbness, syncope, near syncope and tingling  BEHAVIOR/PSYCH:  negative          Physical Exam:    Vitals: /77   Pulse 73   Temp 97.8 °F (36.6 °C)   Resp 20   Ht 5' 9\" (1.753 m)   Wt 196 lb (88.9 kg)   SpO2 95% Comment: room air  BMI 28.94 kg/m²   Last 3 weights: Wt Readings from Last 3 Encounters:   02/18/22 196 lb (88.9 kg)   08/06/21 190 lb 12.8 oz (86.5 kg)   06/16/21 194 lb 12.8 oz (88.4 kg)     Body mass index is 28.94 kg/m².     Physical Examination: General appearance - alert, well appearing, and in no distress and normal appearing weight  Mental status - alert, oriented to person, place, and time  Eyes - sclera anicteric, left eye normal, right eye normal  Ears - right ear normal, left ear normal  Nose - normal and patent, no erythema, discharge or polyps  Mouth - mucous membranes moist, pharynx normal without lesions  Neck - supple, no significant adenopathy  Chest - no tachypnea, retractions or cyanosis, Increase resonance on percussion bilaterally, no wheezing noted, decreased air entry noted and distant breath sounds bilaterally, no rales  Heart - normal rate, regular rhythm, normal S1, S2, no murmurs, rubs, clicks or gallops  Abdomen - soft, nontender, nondistended, no masses or organomegaly  Neurological - alert, oriented, normal speech, no focal findings or movement disorder noted  Extremities - peripheral pulses normal, no pedal edema, no clubbing or cyanosis  Skin - normal coloration and turgor, no rashes, no suspicious skin lesions noted           LABS:    CBC:   WBC   Date Value Ref Range Status   02/17/2017 16.2 (H) 3.5 - 11.0 k/uL Final   02/16/2017 12.0 (H) 3.5 - 11.0 k/uL Final   02/15/2017 14.1 (H) 3.5 - 11.0 k/uL Final     Hemoglobin   Date Value Ref Range Status   02/17/2017 12.8 (L) 13.5 - 17.5 g/dL Final   02/16/2017 13.0 (L) 13.5 - 17.5 g/dL Final   02/15/2017 13.8 13.5 - 17.5 g/dL Final     Platelets   Date Value Ref Range Status   02/17/2017 444 (H) 130 - 400 k/uL Final   02/16/2017 441 (H) 130 - 400 k/uL Final   02/15/2017 415 (H) 130 - 400 k/uL Final     BMP:   Sodium   Date Value Ref Range Status   02/17/2017 139 135 - 144 mmol/L Final   02/16/2017 135 135 - 144 mmol/L Final   02/15/2017 134 (L) 135 - 144 mmol/L Final     Potassium   Date Value Ref Range Status   02/17/2017 4.1 3.7 - 5.3 mmol/L Final   02/16/2017 4.1 3.7 - 5.3 mmol/L Final   02/15/2017 3.6 (L) 3.7 - 5.3 mmol/L Final     Chloride   Date Value Ref Range Status 02/17/2017 102 98 - 107 mmol/L Final   02/16/2017 99 98 - 107 mmol/L Final   02/15/2017 91 (L) 98 - 107 mmol/L Final     CO2   Date Value Ref Range Status   02/17/2017 26 20 - 31 mmol/L Final   02/16/2017 25 20 - 31 mmol/L Final   02/15/2017 23 20 - 31 mmol/L Final     BUN   Date Value Ref Range Status   02/17/2017 16 6 - 20 mg/dL Final   02/16/2017 11 6 - 20 mg/dL Final   02/15/2017 12 6 - 20 mg/dL Final     CREATININE   Date Value Ref Range Status   02/17/2017 0.76 0.70 - 1.20 mg/dL Final   02/16/2017 0.70 0.70 - 1.20 mg/dL Final   02/15/2017 0.76 0.70 - 1.20 mg/dL Final     Glucose   Date Value Ref Range Status   02/17/2017 125 (H) 70 - 99 mg/dL Final   02/16/2017 150 (H) 70 - 99 mg/dL Final   02/15/2017 166 (H) 70 - 99 mg/dL Final     Hepatic:     Amylase: No results found for: AMYLASE  Lipase: No results found for: LIPASE  CARDIAC ENZYMES:     BNP: No results found for: BNP  Lipids:       INR: No results found for: INR  Thyroid: No results found for: TSH  Urinalysis: No results found for: BACTERIA, BLOODU, CLARITYU, COLORU, PHUR, PROTEINU, RBCUA, SPECGRAV, BILIRUBINUR, NITRU, WBCUA, LEUKOCYTESUR, GLUCOSEU  Cultures:-  -----------------------------------------------------------------    ABGs: No results found for: PHART, PO2ART, [id number]    Pulmonary Functions Testing Results:    Pulmonary function test  03/20/2019  Spirometry shows FEV1 is 1.27, 38 percent predicted consistent with severe obstructive ventilatory impairment, FVC is 2.3 9, 57 percent predicted. Lung volume shows RV is 3.83 164% predicted consistent with hyperinflation and air trapping, TLC is 6.33 99% predicted which is within normal limit. DLCO is 10.77 41% predicted consistent with severe reduction in diffusion capacity   Impression:   This pulmonary function test is consistent with severe obstructive ventilatory impairment with hyperinflation/airway trapping on lung volume and severe reduction in diffusion capacity is likely secondary to emphysema and/or concomitant pulmonary vascular disease. Clinical correlation is recommended    Pulmonary function test  5/19/17 from [facility] : FEV1 0.96 27%, Post BD 36.3 31% change, FVC 2.60 58%, [id number] 48%, TLC 6.77 106%, DLCO 9.00 38%    CXR    CT SCANS CHEST    CT chest 06/08/2021. There is development of a right lower lobe nodular opacity with surrounding groundglass opacity.  The solid component measures 1.5 x 0.8 cm.  Development of an additional right upper lobe ill-defined nodular opacity with more subtle surrounding groundglass opacity.  This measures approximately 1.8 x    0.6 cm.  Stable 6 mm left upper lobe and 4 mm left lower lobe pulmonary nodules. Lungs and pleural spaces:     Extensive centrilobular emphysematous changes.  No pleural effusion or pneumothorax.  Diffuse bilateral peribronchial thickening and bilateral bronchiectasis. 06/02/2020  Emphysematous changes are seen unchanged.  A 4 mm nodule in the left lower lobe and a 6 mm nodule in the left upper lobe are stable.  A previously seen 5 mm left lower lobe nodule along the hemidiaphragm is no longer seen on the current study. 12/3/2019  *  6 mm left upper lobe nodule is unchanged since prior study.  Given recent abnormal PET uptake of this nodule, continued follow-up is recommended in 6 months with added attention to new 5 mm left lower lobe nodule at that time.    *  Chronic findings as detailed above. 06/9/2019  6 mm left upper lobe nodule with surrounding groundglass opacity which is stable when compared to prior exam.  The other bilateral pulmonary nodules which were seen on the study of 3/7/2019 are no longer seen on the current exam and may have represented an inflammatory or infectious process which   has since resolved. Emphysematous changes and bronchiectasis which are stable when compared to prior study.       High-resolution CT chest  03/07/19   hows right lower lobe area of nodule 1.2 X 1.2 cm along with some bronchiectasis in that area, left upper lobe small nodule 6 mm and in the right lower lobe posterior medial nodule 4 mm which apparently was not seen on the previous CT scan    06/26/2018  *Stable 3 mm solid noncalcified pulmonary nodule left lower lobe. *Emphysema.       06/21/2017  No concerning nodularity is identified.  Incidental findings of advanced   pulmonary emphysema and coronary artery disease. CT [facility]    07/07/2016  Impression:       1. Further decreased size left lower lobe pulmonary nodule which now        appears linear on coronal reconstructed images. This is felt to        represent likely benign finding.       2. New small nodule within left lower lobe also appears fairly linear        on coronal reconstructed images. This is probably also benign. Follow-       up in one years time could be obtained to document stability. 10/27/2015        New left lower lobe solid nodule measuring 6-7 mm on image 122 of the        axial images.       Lungs and pleural spaces.       Severe upper lung predominant emphysematous changes with mild central        and lower lung bronchiectasis. PET/CT scan 07/26/2021. [facility] facility report available. IMPRESSION:   *  Recently seen new right upper lobe and right lower lobe pulmonary nodules demonstrate resolution and near complete resolution, respectively, as compared to recent CT chest dated 6/8/2021. Dorothe [place] is no corresponding hypermetabolic activity. Jam [place] findings are compatible with benign inflammatory/infectious process. *  Stable left upper lobe and left lower lobe subcentimeter pulmonary nodules without corresponding hypermetabolic activity.  Follow-up CT chest recommended in one year. PET SCAN 04/3/2019  Findings:  The previously seen spiculated lesion in the posterior right lower lobe is substantially smaller than on prior study without significant uptake.     The nodule in the left apex with surrounding groundglass opacity is redemonstrated.  There is only low level uptake, approximately 1 SUV, there is increased relative to background long.  Given the uptake relative to the small size, this remains concerning and follow-up chest CT is recommended. Abdomen and pelvis, neck are negative for abnormal uptake    IMPRESSION:    1.  Low-level uptake in left apical lesion while nonspecific remains concerning given its small size.  Follow-up CT recommended. 2.  There is been significant improvement in the right lower lobe opacity consistent with an inflammatory or infectious lesion         Immunization History   Administered Date(s) Administered    Influenza, Triv, 3 Years and older, IM, PF (Afluria 5yrs and older) 02/16/2017    Pneumococcal Conjugate 13-valent ([id number]) 02/16/2017     6 minute walk test 03/20/19  Distance walk 1250 feet 68% of predicted. Room air saturation at rest 98% lowest saturation after walking 6 minute was 93%. Assessment and Plan       ICD-10-CM    1. Lung nodules  R91.8    2. Centrilobular emphysema ([address]  J43.2    3. History of smoking at least 1 pack per day for at least 30 years  Z87.891        Patient Active Problem List   Diagnosis    CAD (coronary artery disease)    Essential hypertension    Hyperlipidemia    [facility]ierrez         Assessment and discussion    CT scan of the chest on 06/08/2021 compared with CT scan of the chest on 06/02/2020 showed new areas of opacities 1 in right lower lobe as described above when central [facility] component of about 1.5 cm in surrounding groundglass area. There is another in right upper lobe opacity which is more longitudinal and 1.8 cm in length but only 8 mm in the center.   PET scan done on 07/26/2021 showed per report resolution of both the nodule and surrounding infiltrate and there was no other abnormal uptake previously seen left upper lobe and left lower lobe nodule 6 mm and 4 mm had been stable. He will go back to low-dose screening CT scan once a year as previously next one should be in end of July 2022. He was sent to [facility] clinic to evaluate for lung volume reduction surgery but they do not think that he will benefit from lung volume reduction surgery. Plan:    PET scan on 07/26/2021 on review of the images showed resolution of right upper lobe nodule with groundglass changes around and also almost complete resolution of right lower lobe nodule and surrounding infiltrate. There was no PET activity seen in those areas    He will need yearly low-dose screening CT scan next one should be fourth week of July 2022. Requested and discussed with patient  Trelegy once daily to continue  Albuterol as needed  Flonase nasal spray to continue as needed  Had both the doses of covid vaccine and booster   Refused flu vaccine  Annual flu vaccine recommended  Uptodate on Pneumonia vaccine/ prevnar 13    Maintain an active lifestyle   Smoking cessation to continue    Follow up in 6 months    Questions answered to pt's satisfaction    It was my pleasure to evaluate Cary Payne today. Please call with questions. Please note that this chart was generated using voice recognition Dragon dictation software. Although every effort was made to ensure the accuracy of this automated transcription, some errors in transcription may have occurred. Roland Tan MD, MD             2/18/2022, 2:58 PM     I received the images from [address] [place] [address] of PET scan done on 07/26/2021 and PET/CT shows resolution of right upper lobe nodule with groundglass changes around and also almost complete resolution of right lower lobe nodule and surrounding infiltrate.   There was no PET activity seen in those areas        Low Dose CT (LDCT) Lung Screening criteria met:     Age 55-77(Medicare) or 50-80 (USPSTF)   Pack year smoking >30 (Medicare) or >20 (USPSTF)   Still smoking or less than 15 year since quit   No sign or symptoms of lung cancer   > 11 months since last LDCT     Risks and benefits of lung cancer screening with LDCT scans discussed:    Significance of positive screen - False-positive LDCT results often occur. 95% of all positive results do not lead to a diagnosis of cancer. Usually further imaging can resolve most false-positive results; however, some patients may require invasive procedures. Over diagnosis risk - 10% to 12% of screen-detected lung cancer cases are over diagnosed--that is, the cancer would not have been detected in the patient's lifetime without the screening. Need for follow up screens annually to continue lung cancer screening effectiveness     Risks associated with radiation from annual LDCT- Radiation exposure is about the same as for a mammogram, which is about 1/3 of the annual background radiation exposure from everyday life. Starting screening at age 54 is not likely to increase cancer risk from radiation exposure. Patients with comorbidities resulting in life expectancy of < 10 years, or that would preclude treatment of an abnormality identified on CT, should not be screened due to lack of benefit.     To obtain maximal benefit from this screening, smoking cessation and long-term abstinence from smoking is critical

## 2022-02-28 RX ORDER — FLUTICASONE FUROATE, UMECLIDINIUM BROMIDE AND VILANTEROL TRIFENATATE 100; 62.5; 25 UG/1; UG/1; UG/1
POWDER RESPIRATORY (INHALATION)
Qty: 3 EACH | Refills: 3 | Status: SHIPPED | OUTPATIENT
Start: 2022-02-28

## 2022-03-16 NOTE — TELEPHONE ENCOUNTER
LAST VISIT: 2/18/22  NEXT VISIT: 7/6/22    Per last dictation patient to continue this medication. Please sign for refill if ok. Thank you. [FreeTextEntry1] : Patient will continue on his present regimen of medications.  As he is asymptomatic on good medication with good exercise tolerance I see no reason for any further cardiac work-up.\par \par Routine follow with me again in 4 months\par

## 2022-07-05 DIAGNOSIS — Z87.891 HISTORY OF SMOKING AT LEAST 1 PACK PER DAY FOR AT LEAST 30 YEARS: ICD-10-CM

## 2022-07-05 DIAGNOSIS — R91.8 LUNG NODULES: ICD-10-CM

## 2022-07-06 DIAGNOSIS — R91.1 LUNG NODULE: Primary | ICD-10-CM

## 2022-07-12 ENCOUNTER — TELEMEDICINE (OUTPATIENT)
Dept: PULMONOLOGY | Age: 67
End: 2022-07-12
Payer: MEDICARE

## 2022-07-12 DIAGNOSIS — Z87.891 HISTORY OF SMOKING AT LEAST 1 PACK PER DAY FOR AT LEAST 30 YEARS: ICD-10-CM

## 2022-07-12 DIAGNOSIS — R91.8 LUNG NODULES: Primary | ICD-10-CM

## 2022-07-12 DIAGNOSIS — J43.2 CENTRILOBULAR EMPHYSEMA (HCC): ICD-10-CM

## 2022-07-12 DIAGNOSIS — Z86.16 PERSONAL HISTORY OF COVID-19: ICD-10-CM

## 2022-07-12 PROCEDURE — G8427 DOCREV CUR MEDS BY ELIG CLIN: HCPCS | Performed by: INTERNAL MEDICINE

## 2022-07-12 PROCEDURE — 99213 OFFICE O/P EST LOW 20 MIN: CPT | Performed by: INTERNAL MEDICINE

## 2022-07-12 PROCEDURE — 3023F SPIROM DOC REV: CPT | Performed by: INTERNAL MEDICINE

## 2022-07-12 PROCEDURE — 3017F COLORECTAL CA SCREEN DOC REV: CPT | Performed by: INTERNAL MEDICINE

## 2022-07-12 PROCEDURE — 1123F ACP DISCUSS/DSCN MKR DOCD: CPT | Performed by: INTERNAL MEDICINE

## 2022-07-12 PROCEDURE — G8417 CALC BMI ABV UP PARAM F/U: HCPCS | Performed by: INTERNAL MEDICINE

## 2022-07-12 PROCEDURE — 1036F TOBACCO NON-USER: CPT | Performed by: INTERNAL MEDICINE

## 2022-07-12 NOTE — PROGRESS NOTES
OUTPATIENT PULMONARY PROGRESS NOTE      Patient:  Rohini Shah  MRN: [id number]    Consulting Physician: Dr Chris Guzman  Reason for Consult: Dyspnea, COPD  Primacy Care Physician: Ronel Foster MD    HISTORY OF PRESENT ILLNESS:   The patient is a 74 y.o. male   He is here for follow-up today, he has history of severe  COPD/Emphysema and history of lung nodules/scarring which was stable for 2 years on CT scan in the past.    Since he was seen last time he did not have any exacerbation no steroid and antibiotic use. According patient he had gained weight in winter and he has slightly more shortness of breath dyspnea on exertion. He is able to do regular activities at home including taking shower walking inside the house but when he has to walk outside carry the garbage and has to walk fast especially when it is cold he gets more short of breath. He does not complain of cough he has only occasional sputum when he is sputum it is usually clear and white. He does not complain of wheezing he denies nocturnal awakening with cough wheezing chest tightness or shortness of breath. He does not have orthopnea PND or pedal edema denies chest pain fever hemoptysis. He is taking Trelegy once daily and he does not use albuterol has not use albuterol for long time. He was referred for pulmonary rehabilitation in the past but he stopped as he wanted to do exercises at home. Previous work-up in 2021  He had a PET scan on 01/26/2021 as a CT scan of the chest on 06/08/2021 showed new right upper lobe and new right lower lobe infiltrate with surrounding inflammatory changes. PET CT was done on 07/26/2021 in [facility] facility according to detailed CT report previously seen 1.8 cm right upper lobe nodule and 1.5 cm right lower lobe nodule with inflammatory changes had resolved on PET/CT. Previously seen left lung nodules 4 mm and 6 mm right stable.   CT scan was done on 06/08/2021 in [address] [place] [address] and which showed 2 areas 1 in right lower lobe with a solid central component and surrounding groundglass infiltrate posteriorly. There is another area in right upper lobe just close to the fissure laterally which is more of a longitudinal area of about 1.5 cm about 8 mm. These areas were not present on the CT scan in June 2020 and were new since then. Previous CT scan chest  He had CT scans for follow-up of left upper lobe lung nodule/left lower lobe nodule and new left lower lobe nodule seen in December 2019, as compared to CT scan in June 2019 the CT scan 6 months later in December 2019 did not show any change in the left upper lobe nodule but shows a new 5 mm left lower lobe nodule adjacent to the diaphragm. CT scan chest on 06/02/20 showed no change in 6 mm SARA nodule and 4 mm LLL nodule but another 5 mm left lower lobe nodule above the diaphragm seen in December 2019 not seen any more on CT scan on 06/02/2020       He was referred in the past to [facility] for opinion regarding lung volume reduction surgery especially with left upper lobe nodule. When he was seen in [place] clinic he was clinically and symptomatically stable for many years and he was deemed not a candidate for surgery at this time and can be followed up in the future, he had a pulmonary function test done which was consistent with severe COPD without much change from previous function test which were done here in the past.  Since he was seen last time he had a CT scan of the chest which was ordered on last visit to be done in June and it was done on June 9 which showed resolution of right lower lobe spiculated nodule which was seen in March, also shows other nodular area in lower lungs which resolved and possibly bronchiectasis, left upper lobe nodule 6 mm is stable as compared to March.     Previous visits/work-up  He had screening CT scan done in June 2018 which showed stable 3 mm left lower lobe nodule, in March 2019 a high-resolution CT chest was ordered for evaluation of lung volume reduction surgery and/or transplant evaluation and also pulmonary function test and alpha 1 antitrypsin phenotype was ordered. High-resolution CT of the chest on 03/07/19 shows right lower lobe area of nodular infiltrate 1.2 X 1.2 cm along with some bronchiectasis in that area, left upper lobe small nodule 6 mm and in the right lower lobe posterior medial nodule 4 mm which apparently was not seen on the previous CT scan. PET scan was done on 04/02/19 which showed the right lower lobe area of infiltrate atelectasis is smaller and no uptake there as it is decreasing in size and not FDG avid suggestive of inflammatory origin, the left upper lobe nodule shows slight uptake although uptake was low but considering the small size of the nodule (6 mm) it was still considered concerning. Alpha 1 antitrypsin phenotype is PI MM  Pulmonary function test done last visit shows severe obstructive impairment with air trapping and severe reduction in diffusion capacity. 6 minute walk test did not show excise-induced hypoxia did require long-term oxygen therapy.       Initial history and evaluation  H/O COPD diagnosed since 2011 when he had also stent placed for CAD by Dr Jennifer Rivera since 2011  Stopped smoking about a year ago and he had a smoke 2 pack per day for 49-50 years  H/O LL lung nodule and had ct scan in 2014, 2015 and 2016 in [facility]      Past Medical History:        Diagnosis Date    CAD (coronary artery disease) 2011    stents    Centrilobular emphysema ([address]     COPD (chronic obstructive pulmonary disease) ([address]     COPD exacerbation ([address] 2/15/2017    COVID     Emphysema of lung ([address]     Examination of participant in clinical trial 5/20/13    6 year follow up, estimated completion JUN 2019    Hyperlipidemia     Hypokalemia     Lung nodule     Oral thrush 11/3/2016    Thrush     Tongue irritation 11/3/2016       Past Surgical History:        Procedure Laterality Date redness  HEENT:  negative for  hearing loss, tinnitus, ear drainage, epistaxis, snoring, sore mouth, hoarseness and voice change  RESPIRATORY:  Positive for dyspnea on exertion, negative for  wheezing, dry cough, cough with sputum, hemoptysis, chest pain, pleuritic pain and cyanosis  CARDIOVASCULAR:  Positive for  dyspnea on exertion, negative for  chest pain, palpitations, orthopnea, PND, exertional chest pressure/discomfort, fatigue, early saiety, edema, syncope  GASTROINTESTINAL:  negative for nausea, vomiting, change in bowel habits, diarrhea, constipation, abdominal pain, abdominal distention, jaundice, dysphagia, reflux, regurgitation, odynophagia, hematemesis and hemtochezia  GENITOURINARY:  Negative for frequency, dysuria, nocturia, urinary incontinence, hesitancy, decreased stream and hematuria  HEMATOLOGIC/LYMPHATIC:  negative for easy bruising, bleeding, lymphadenopathy and petechiae  ALLERGIC/IMMUNOLOGIC:  negative for recurrent infections, urticaria, hay fever, angioedema, anaphylaxis and drug reactions  ENDOCRINE:  negative for heat intolerance, cold intolerance, tremor, weight changes and change in bowel habits  MUSCULOSKELETAL: Negative for joint pain, negative for  myalgias, arthralgias, pain, joint swelling, decreased range of motion and muscle weakness  NEUROLOGICAL:  negative for headaches, dizziness, seizures, memory problems, speech problems, visual disturbance, gait problems, tremor, dysphagia, weakness, numbness, syncope, near syncope and tingling  BEHAVIOR/PSYCH:  negative          Physical Exam:    Vitals: There were no vitals taken for this visit. Last 3 weights: Wt Readings from Last 3 Encounters:   02/18/22 196 lb (88.9 kg)   08/06/21 190 lb 12.8 oz (86.5 kg)   06/16/21 194 lb 12.8 oz (88.4 kg)     There is no height or weight on file to calculate BMI.     Physical Examination:   General appearance - alert, well appearing, and in no distress and normal appearing weight  Mental status - alert, oriented to person, place, and time  Eyes - sclera anicteric, left eye normal, right eye normal  Ears - right ear normal, left ear normal  Nose - normal and patent, no erythema, discharge or polyps  Mouth - mucous membranes moist, pharynx normal without lesions  Neck - supple, no significant adenopathy  Chest - no tachypnea, retractions or cyanosis, Increase resonance on percussion bilaterally, no wheezing noted, decreased air entry noted and distant breath sounds bilaterally, no rales  Heart - normal rate, regular rhythm, normal S1, S2, no murmurs, rubs, clicks or gallops  Abdomen - soft, nontender, nondistended, no masses or organomegaly  Neurological - alert, oriented, normal speech, no focal findings or movement disorder noted  Extremities - peripheral pulses normal, no pedal edema, no clubbing or cyanosis  Skin - normal coloration and turgor, no rashes, no suspicious skin lesions noted           LABS:    CBC:   WBC   Date Value Ref Range Status   02/17/2017 16.2 (H) 3.5 - 11.0 k/uL Final   02/16/2017 12.0 (H) 3.5 - 11.0 k/uL Final   02/15/2017 14.1 (H) 3.5 - 11.0 k/uL Final     Hemoglobin   Date Value Ref Range Status   02/17/2017 12.8 (L) 13.5 - 17.5 g/dL Final   02/16/2017 13.0 (L) 13.5 - 17.5 g/dL Final   02/15/2017 13.8 13.5 - 17.5 g/dL Final     Platelets   Date Value Ref Range Status   02/17/2017 444 (H) 130 - 400 k/uL Final   02/16/2017 441 (H) 130 - 400 k/uL Final   02/15/2017 415 (H) 130 - 400 k/uL Final     BMP:   Sodium   Date Value Ref Range Status   02/17/2017 139 135 - 144 mmol/L Final   02/16/2017 135 135 - 144 mmol/L Final   02/15/2017 134 (L) 135 - 144 mmol/L Final     Potassium   Date Value Ref Range Status   02/17/2017 4.1 3.7 - 5.3 mmol/L Final   02/16/2017 4.1 3.7 - 5.3 mmol/L Final   02/15/2017 3.6 (L) 3.7 - 5.3 mmol/L Final     Chloride   Date Value Ref Range Status   02/17/2017 102 98 - 107 mmol/L Final   02/16/2017 99 98 - 107 mmol/L Final   02/15/2017 91 (L) 98 - 107 mmol/L Final     CO2   Date Value Ref Range Status   02/17/2017 26 20 - 31 mmol/L Final   02/16/2017 25 20 - 31 mmol/L Final   02/15/2017 23 20 - 31 mmol/L Final     BUN   Date Value Ref Range Status   02/17/2017 16 6 - 20 mg/dL Final   02/16/2017 11 6 - 20 mg/dL Final   02/15/2017 12 6 - 20 mg/dL Final     CREATININE   Date Value Ref Range Status   02/17/2017 0.76 0.70 - 1.20 mg/dL Final   02/16/2017 0.70 0.70 - 1.20 mg/dL Final   02/15/2017 0.76 0.70 - 1.20 mg/dL Final     Glucose   Date Value Ref Range Status   02/17/2017 125 (H) 70 - 99 mg/dL Final   02/16/2017 150 (H) 70 - 99 mg/dL Final   02/15/2017 166 (H) 70 - 99 mg/dL Final     Hepatic:     Amylase: No results found for: AMYLASE  Lipase: No results found for: LIPASE  CARDIAC ENZYMES:     BNP: No results found for: BNP  Lipids:       INR: No results found for: INR  Thyroid: No results found for: T4, TSH  Urinalysis: No results found for: BACTERIA, BLOODU, CLARITYU, COLORU, PHUR, PROTEINU, RBCUA, SPECGRAV, BILIRUBINUR, NITRU, WBCUA, LEUKOCYTESUR, GLUCOSEU  Cultures:-  -----------------------------------------------------------------    ABGs: No results found for: PHART, PO2ART, WCR1HXR    Pulmonary Functions Testing Results:    Pulmonary function test  03/20/2019  Spirometry shows FEV1 is 1.27, 38 percent predicted consistent with severe obstructive ventilatory impairment, FVC is 2.3 9, 57 percent predicted. Lung volume shows RV is 3.83 164% predicted consistent with hyperinflation and air trapping, TLC is 6.33 99% predicted which is within normal limit. DLCO is 10.77 41% predicted consistent with severe reduction in diffusion capacity   Impression: This pulmonary function test is consistent with severe obstructive ventilatory impairment with hyperinflation/airway trapping on lung volume and severe reduction in diffusion capacity is likely secondary to emphysema and/or concomitant pulmonary vascular disease.   Clinical correlation is recommended    Pulmonary function test  5/19/17 from [facility] : FEV1 0.96 27%, Post BD 36.3 31% change, FVC 2.60 58%, [id number] 48%, TLC 6.77 106%, DLCO 9.00 38%    CXR    CT SCANS CHEST    CT chest 06/08/2021. There is development of a right lower lobe nodular opacity with surrounding groundglass opacity.  The solid component measures 1.5 x 0.8 cm.  Development of an additional right upper lobe ill-defined nodular opacity with more subtle surrounding groundglass opacity.  This measures approximately 1.8 x    0.6 cm.  Stable 6 mm left upper lobe and 4 mm left lower lobe pulmonary nodules. Lungs and pleural spaces:     Extensive centrilobular emphysematous changes.  No pleural effusion or pneumothorax.  Diffuse bilateral peribronchial thickening and bilateral bronchiectasis. 06/02/2020  Emphysematous changes are seen unchanged.  A 4 mm nodule in the left lower lobe and a 6 mm nodule in the left upper lobe are stable.  A previously seen 5 mm left lower lobe nodule along the hemidiaphragm is no longer seen on the current study. 12/3/2019  *  6 mm left upper lobe nodule is unchanged since prior study.  Given recent abnormal PET uptake of this nodule, continued follow-up is recommended in 6 months with added attention to new 5 mm left lower lobe nodule at that time.    *  Chronic findings as detailed above. 06/9/2019  6 mm left upper lobe nodule with surrounding groundglass opacity which is stable when compared to prior exam.  The other bilateral pulmonary nodules which were seen on the study of 3/7/2019 are no longer seen on the current exam and may have represented an inflammatory or infectious process which   has since resolved. Emphysematous changes and bronchiectasis which are stable when compared to prior study.       High-resolution CT chest  03/07/19   hows right lower lobe area of nodule 1.2 X 1.2 cm along with some bronchiectasis in that area, left upper lobe small nodule 6 mm and in the right lower lobe posterior medial nodule 4 mm which apparently was not seen on the previous CT scan    06/26/2018  *Stable 3 mm solid noncalcified pulmonary nodule left lower lobe. *Emphysema.       06/21/2017  No concerning nodularity is identified.  Incidental findings of advanced   pulmonary emphysema and coronary artery disease. CT scan chest from [facility]    07/07/2016  Impression:       1. Further decreased size left lower lobe pulmonary nodule which now        appears linear on coronal reconstructed images. This is felt to        represent likely benign finding.       2. New small nodule within left lower lobe also appears fairly linear        on coronal reconstructed images. This is probably also benign. Follow-       up in one years time could be obtained to document stability. 10/27/2015        New left lower lobe solid nodule measuring 6-7 mm on image 122 of the        axial images.       Lungs and pleural spaces.       Severe upper lung predominant emphysematous changes with mild central        and lower lung bronchiectasis. PET/CT scan 07/26/2021. [facility] facility report available. IMPRESSION:   *  Recently seen new right upper lobe and right lower lobe pulmonary nodules demonstrate resolution and near complete resolution, respectively, as compared to recent CT chest dated 6/8/2021. Rosalinda Pleas is no corresponding hypermetabolic activity. Jerry Bliss findings are compatible with benign inflammatory/infectious process. *  Stable left upper lobe and left lower lobe subcentimeter pulmonary nodules without corresponding hypermetabolic activity.  Follow-up CT chest recommended in one year. PET SCAN 04/3/2019  Findings:  The previously seen spiculated lesion in the posterior right lower lobe is substantially smaller than on prior study without significant uptake.     The nodule in the left apex with surrounding groundglass opacity is redemonstrated.  There is only low level uptake, approximately 1 SUV, there is increased relative to background long.  Given the uptake relative to the small size, this remains concerning and follow-up chest CT is recommended. Abdomen and pelvis, neck are negative for abnormal uptake    IMPRESSION:    1.  Low-level uptake in left apical lesion while nonspecific remains concerning given its small size.  Follow-up CT recommended. 2.  There is been significant improvement in the right lower lobe opacity consistent with an inflammatory or infectious lesion         Immunization History   Administered Date(s) Administered    Influenza, Triv, 3 Years and older, IM, PF (Afluria 5yrs and older) 02/16/2017    Pneumococcal Conjugate 13-valent ([id number]) 02/16/2017     6 minute walk test 03/20/19  Distance walk 1250 feet 68% of predicted. Room air saturation at rest 98% lowest saturation after walking 6 minute was 93%. Assessment and Plan       ICD-10-CM    1. Lung nodules  R91.8    2. Centrilobular emphysema ([address]  J43.2    3. History of smoking at least 1 pack per day for at least 30 years  Z87.891    4. Personal history of COVID-19  Z86.16        Patient Active Problem List   Diagnosis    CAD (coronary artery disease)    Essential hypertension    Hyperlipidemia    S/P cardiac cath Patennt LAD & RCA stent 5/12/15-Dr. gutierrez         Assessment and discussion    CT scan of the chest on 07/01/2022 seen and is compared to CT scan of 06/08/2021 and new right upper lobe anterior nodule was seen which has a small cavitary area. Since patient had COVID about around 2 weeks ago and has been having some symptoms at that time it is likely inflammatory he had a history of waxing and waning lung nodules which appear and disappear on previous CT scan. I would get CT scan of the chest in 6 to 8 weeks from the last CT scan that will be first week of September and follow-up in the office.   There is another area of right upper lobe infiltrate septal thickening on CT scan on 07/01/2022 which is also present on CT scan on 06/08/2021 but decreased nodularity we will also follow-up on the CT scan in the next 6 to 8 weeks and determine if there is any change because this could be also infectious inflammatory. He has severe emphysematous bullous changes severe COPD we will try to avoid biopsy if possible as high risk for pneumothorax  He was sent to [facility] clinic to evaluate for lung volume reduction surgery but they do not think that he will benefit from lung volume reduction surgery. Plan:    CT scan of the chest and hospital September 2022. If CT scan at that time showed resolution of right upper lobe nodule as he had history of intermittent waxing and waning nodules with appearance. He also have right upper lobe lateral infiltrate likely related which was present in June 2021 annual follow-up also on neck CT scan as this could be also inflammatory/infectious. Trelegy once daily to continue  Albuterol as needed  Flonase nasal spray to continue as needed  Had both the doses of covid vaccine and boosters   Refused flu vaccine  Annual flu vaccine recommended  Uptodate on Pneumonia vaccine/ prevnar 13    Maintain an active lifestyle   Smoking cessation to continue    Follow up in 2.5 months (1 to 2 weeks after CT scan)    Questions answered to pt's satisfaction    It was my pleasure to evaluate [place] today. Please call with questions. Please note that this chart was generated using voice recognition Dragon dictation software. Although every effort was made to ensure the accuracy of this automated transcription, some errors in transcription may have occurred. Tavia Torres MD, MD             7/12/2022, 9:22 AM     I received the images from [address] [place] [address] of PET scan done on 07/26/2021 and PET/CT shows resolution of right upper lobe nodule with groundglass changes around and also almost complete resolution of right lower lobe nodule and surrounding infiltrate.   There was no PET activity seen in those areas

## 2022-09-06 ENCOUNTER — HOSPITAL ENCOUNTER (OUTPATIENT)
Dept: CT IMAGING | Age: 67
Discharge: HOME | End: 2022-09-08
Payer: MEDICARE

## 2022-09-06 DIAGNOSIS — R91.1 LUNG NODULE: ICD-10-CM

## 2022-09-06 PROCEDURE — 71250 CT THORAX DX C-: CPT

## 2022-09-13 ENCOUNTER — OFFICE VISIT (OUTPATIENT)
Dept: PULMONOLOGY | Age: 67
End: 2022-09-13
Payer: MEDICARE

## 2022-09-13 VITALS
HEIGHT: 69 IN | RESPIRATION RATE: 18 BRPM | DIASTOLIC BLOOD PRESSURE: 79 MMHG | BODY MASS INDEX: 27.55 KG/M2 | WEIGHT: 186 LBS | OXYGEN SATURATION: 97 % | SYSTOLIC BLOOD PRESSURE: 115 MMHG | HEART RATE: 54 BPM

## 2022-09-13 DIAGNOSIS — R91.8 LUNG NODULES: Primary | ICD-10-CM

## 2022-09-13 DIAGNOSIS — Z86.16 PERSONAL HISTORY OF COVID-19: ICD-10-CM

## 2022-09-13 DIAGNOSIS — J43.2 CENTRILOBULAR EMPHYSEMA (HCC): ICD-10-CM

## 2022-09-13 DIAGNOSIS — Z87.891 HISTORY OF SMOKING AT LEAST 1 PACK PER DAY FOR AT LEAST 30 YEARS: ICD-10-CM

## 2022-09-13 PROCEDURE — 1123F ACP DISCUSS/DSCN MKR DOCD: CPT | Performed by: INTERNAL MEDICINE

## 2022-09-13 PROCEDURE — 3017F COLORECTAL CA SCREEN DOC REV: CPT | Performed by: INTERNAL MEDICINE

## 2022-09-13 PROCEDURE — 3023F SPIROM DOC REV: CPT | Performed by: INTERNAL MEDICINE

## 2022-09-13 PROCEDURE — 99214 OFFICE O/P EST MOD 30 MIN: CPT | Performed by: INTERNAL MEDICINE

## 2022-09-13 PROCEDURE — G8427 DOCREV CUR MEDS BY ELIG CLIN: HCPCS | Performed by: INTERNAL MEDICINE

## 2022-09-13 PROCEDURE — 1036F TOBACCO NON-USER: CPT | Performed by: INTERNAL MEDICINE

## 2022-09-13 PROCEDURE — G8417 CALC BMI ABV UP PARAM F/U: HCPCS | Performed by: INTERNAL MEDICINE

## 2022-09-13 NOTE — LETTER
[address] [facility] [address]  Phone: [phone]  Fax: [phone]    Yane Harrington MD    September 13, 2022     Xavier Links, [address] [place] [address]    Patient: Marcial Prince   MR Number: [id number]   YOB: 1955   Date of Visit: 9/13/2022       Dear Xavier Links:    Thank you for referring Marcial Prince to me for evaluation/treatment. Below are the relevant portions of my assessment and plan of care. If you have questions, please do not hesitate to call me. I look forward to following Compa Hylton along with you.     Sincerely,      Yane Harrington MD

## 2022-09-13 NOTE — PROGRESS NOTES
OUTPATIENT PULMONARY PROGRESS NOTE      Patient:  Dayana Mariano  MRN: [id number]    Consulting Physician: Dr Anton Parks  Reason for Consult: Dyspnea, COPD  Primacy Care Physician: Olayinka Hurst MD    HISTORY OF PRESENT ILLNESS:   The patient is a 74 y.o. male   He is here for follow-up today, he has history of severe  COPD/Emphysema and history of lung nodules/scarring which was stable for 2 years on CT scan in the past.    He has history of waxing and waning lung nodules at different times and at different locations on multiple CT scans in the past.  CT scan of the chest which was done on 07/01/2022 at that time showed new right upper lobe nodular infiltrate Other area in right upper lobe anteriorly is the same which has been present and more of a fibrotic scarlike stable. CT scan of the chest for short-term follow-up is a scheduled and was done on 09/06/2022 which showed now the resolution of right upper lobe nodular infiltrate but showed a new left upper lobe nodule 6 mm and a new lingular nodule which was not present on CT scan of the chest on 07/01/2022. According to patient around the time he had CT scan done he was diagnosed with COVID infection he was feeling some fever tiredness fatigue decreased appetite slight increase in cough symptoms lasted for about 5 to 6 days and now he is better. He has history of intermittent cough without much change he does not usually complain of sputum production when he has a sputum it is clear and white. He has more problem with humidity and when he is changing environment. He is able to walk outside think that he can walk 1 mile at a slower pace he is able to do regular activities at home. He denies nocturnal awakening with cough wheezing chest tightness or shortness of breath. He does not require albuterol and is not taking albuterol for long time. He denies any chest pain hemoptysis night sweats fever.   According to patient he had lost     He was referred for pulmonary rehabilitation in the past but he stopped as he wanted to do exercises at home. Previous work-up in 2021  He had a PET scan on 01/26/2021 as a CT scan of the chest on 06/08/2021 showed new right upper lobe and new right lower lobe infiltrate with surrounding inflammatory changes. PET CT was done on 07/26/2021 in [facility] facility according to detailed CT report previously seen 1.8 cm right upper lobe nodule and 1.5 cm right lower lobe nodule with inflammatory changes had resolved on PET/CT. Previously seen left lung nodules 4 mm and 6 mm right stable. CT scan was done on 06/08/2021 in [address] [place] [address] and which showed 2 areas 1 in right lower lobe with a solid central component and surrounding groundglass infiltrate posteriorly. There is another area in right upper lobe just close to the fissure laterally which is more of a longitudinal area of about 1.5 cm about 8 mm. These areas were not present on the CT scan in June 2020 and were new since then. Previous CT scan chest  He had CT scans for follow-up of left upper lobe lung nodule/left lower lobe nodule and new left lower lobe nodule seen in December 2019, as compared to CT scan in June 2019 the CT scan 6 months later in December 2019 did not show any change in the left upper lobe nodule but shows a new 5 mm left lower lobe nodule adjacent to the diaphragm. CT scan chest on 06/02/20 showed no change in 6 mm SARA nodule and 4 mm LLL nodule but another 5 mm left lower lobe nodule above the diaphragm seen in December 2019 not seen any more on CT scan on 06/02/2020       He was referred in the past to [facility] for opinion regarding lung volume reduction surgery especially with left upper lobe nodule.   When he was seen in [facility] he was clinically and symptomatically stable for many years and he was deemed not a candidate for surgery at this time and can be followed up in the future, he had a pulmonary function test done which was consistent with severe COPD without much change from previous function test which were done here in the past.  Since he was seen last time he had a CT scan of the chest which was ordered on last visit to be done in June and it was done on June 9 which showed resolution of right lower lobe spiculated nodule which was seen in March, also shows other nodular area in lower lungs which resolved and possibly bronchiectasis, left upper lobe nodule 6 mm is stable as compared to March. Previous visits/work-up  He had screening CT scan done in June 2018 which showed stable 3 mm left lower lobe nodule, in March 2019 a high-resolution CT chest was ordered for evaluation of lung volume reduction surgery and/or transplant evaluation and also pulmonary function test and alpha 1 antitrypsin phenotype was ordered. High-resolution CT of the chest on 03/07/19 shows right lower lobe area of nodular infiltrate 1.2 X 1.2 cm along with some bronchiectasis in that area, left upper lobe small nodule 6 mm and in the right lower lobe posterior medial nodule 4 mm which apparently was not seen on the previous CT scan. PET scan was done on 04/02/19 which showed the right lower lobe area of infiltrate atelectasis is smaller and no uptake there as it is decreasing in size and not FDG avid suggestive of inflammatory origin, the left upper lobe nodule shows slight uptake although uptake was low but considering the small size of the nodule (6 mm) it was still considered concerning. Alpha 1 antitrypsin phenotype is PI MM  Pulmonary function test done last visit shows severe obstructive impairment with air trapping and severe reduction in diffusion capacity. 6 minute walk test did not show excise-induced hypoxia did require long-term oxygen therapy.       Initial history and evaluation  H/O COPD diagnosed since 2011 when he had also stent placed for CAD by Dr Rider Son since 2011  Stopped smoking about a year ago and he had a smoke 2 pack per day for 49-50 years  H/O LL lung nodule and had ct scan in 2014, 2015 and 2016 in [place]      Past Medical History:        Diagnosis Date    CAD (coronary artery disease) 2011    stents    Centrilobular emphysema ([address]     COPD (chronic obstructive pulmonary disease) ([address]     COPD exacerbation ([address] 2/15/2017    COVID     Emphysema of lung ([address]     Examination of participant in clinical trial 5/20/13    6 year follow up, estimated completion JUN 2019    Hyperlipidemia     Hypokalemia     Lung nodule     Oral thrush 11/3/2016    Thrush     Tongue irritation 11/3/2016       Past Surgical History:        Procedure Laterality Date    CARDIAC CATHETERIZATION  2011, 2013    CORONARY ANGIOPLASTY WITH STENT PLACEMENT  2011    x2     CORONARY ANGIOPLASTY WITH STENT PLACEMENT  5/2013    x1    HERNIA REPAIR         Allergies: Allergies   Allergen Reactions    Pcn [Penicillins]          Home Meds:   Outpatient Encounter Medications as of 9/13/2022   Medication Sig Dispense Refill    TRELEGY ELLIPTA 100-62.5-25 MCG/INH AEPB TAKE 1 PUFF BY MOUTH EVERY DAY 3 each 3    metoprolol tartrate (LOPRESSOR) 25 MG tablet Take 25 mg by mouth 2 times daily      metFORMIN (GLUCOPHAGE) 500 MG tablet Take 500 mg by mouth 2 times daily (with meals)      dilTIAZem (CARDIZEM CD) 120 MG extended release capsule Take 240 mg by mouth daily       rosuvastatin (CRESTOR) 10 MG tablet Take 10 mg by mouth daily      albuterol sulfate HFA (PROVENTIL HFA) 108 (90 BASE) MCG/ACT inhaler Inhale 2 puffs into the lungs every 6 hours as needed for Wheezing or Shortness of Breath 1 Inhaler 0    prasugrel (EFFIENT) 10 MG TABS Take 10 mg by mouth daily      Flaxseed, Linseed, (FLAXSEED OIL) 1000 MG CAPS Take 1 capsule by mouth daily       aspirin 81 MG tablet Take 81 mg by mouth daily. No facility-administered encounter medications on file as of 9/13/2022. Social History:   TOBACCO:   reports that he quit smoking in February 2017.  2 ppd for 45 years and then 1 ppd. He has never used smokeless tobacco.  ETOH:   reports no history of alcohol use.   OCCUPATION:      Family History:       Problem Relation Age of Onset    Heart Defect Father        Immunizations:    Immunization History   Administered Date(s) Administered    Influenza, Triv, 3 Years and older, IM, PF (Afluria 5yrs and older) 02/16/2017    Pneumococcal Conjugate 13-valent (Spike Grady) 02/16/2017         REVIEW OF SYSTEMS:  CONSTITUTIONAL:  negative for  fevers, chills, sweats, fatigue, malaise, anorexia and weight loss  EYES:  negative for  double vision, blurred vision, visual disturbance and redness  HEENT:  negative for  hearing loss, tinnitus, ear drainage, epistaxis, snoring, sore mouth, hoarseness and voice change  RESPIRATORY:  Positive for dyspnea on exertion, negative for  wheezing, dry cough, cough with sputum, hemoptysis, chest pain, pleuritic pain and cyanosis  CARDIOVASCULAR:  Positive for  dyspnea on exertion, negative for  chest pain, palpitations, orthopnea, PND, exertional chest pressure/discomfort, fatigue, early saiety, edema, syncope  GASTROINTESTINAL:  negative for nausea, vomiting, change in bowel habits, diarrhea, constipation, abdominal pain, abdominal distention, jaundice, dysphagia, reflux, regurgitation, odynophagia, hematemesis and hemtochezia  GENITOURINARY:  Negative for frequency, dysuria, nocturia, urinary incontinence, hesitancy, decreased stream and hematuria  HEMATOLOGIC/LYMPHATIC:  negative for easy bruising, bleeding, lymphadenopathy and petechiae  ALLERGIC/IMMUNOLOGIC:  negative for recurrent infections, urticaria, hay fever, angioedema, anaphylaxis and drug reactions  ENDOCRINE:  negative for heat intolerance, cold intolerance, tremor, weight changes and change in bowel habits  MUSCULOSKELETAL: Negative for joint pain, negative for  myalgias, arthralgias, pain, joint swelling, decreased range of motion and muscle weakness  NEUROLOGICAL:  negative for Final   02/16/2017 13.0 (L) 13.5 - 17.5 g/dL Final   02/15/2017 13.8 13.5 - 17.5 g/dL Final     Platelets   Date Value Ref Range Status   02/17/2017 444 (H) 130 - 400 k/uL Final   02/16/2017 441 (H) 130 - 400 k/uL Final   02/15/2017 415 (H) 130 - 400 k/uL Final     BMP:   Sodium   Date Value Ref Range Status   02/17/2017 139 135 - 144 mmol/L Final   02/16/2017 135 135 - 144 mmol/L Final   02/15/2017 134 (L) 135 - 144 mmol/L Final     Potassium   Date Value Ref Range Status   02/17/2017 4.1 3.7 - 5.3 mmol/L Final   02/16/2017 4.1 3.7 - 5.3 mmol/L Final   02/15/2017 3.6 (L) 3.7 - 5.3 mmol/L Final     Chloride   Date Value Ref Range Status   02/17/2017 102 98 - 107 mmol/L Final   02/16/2017 99 98 - 107 mmol/L Final   02/15/2017 91 (L) 98 - 107 mmol/L Final     CO2   Date Value Ref Range Status   02/17/2017 26 20 - 31 mmol/L Final   02/16/2017 25 20 - 31 mmol/L Final   02/15/2017 23 20 - 31 mmol/L Final     BUN   Date Value Ref Range Status   02/17/2017 16 6 - 20 mg/dL Final   02/16/2017 11 6 - 20 mg/dL Final   02/15/2017 12 6 - 20 mg/dL Final     Creatinine   Date Value Ref Range Status   02/17/2017 0.76 0.70 - 1.20 mg/dL Final   02/16/2017 0.70 0.70 - 1.20 mg/dL Final   02/15/2017 0.76 0.70 - 1.20 mg/dL Final     Glucose   Date Value Ref Range Status   02/17/2017 125 (H) 70 - 99 mg/dL Final   02/16/2017 150 (H) 70 - 99 mg/dL Final   02/15/2017 166 (H) 70 - 99 mg/dL Final     Hepatic:     Amylase: No results found for: AMYLASE  Lipase: No results found for: LIPASE  CARDIAC ENZYMES:     BNP: No results found for: BNP  Lipids:       INR: No results found for: INR  Thyroid: No results found for: T4, TSH  Urinalysis: No results found for: BACTERIA, BLOODU, CLARITYU, COLORU, PHUR, PROTEINU, RBCUA, SPECGRAV, BILIRUBINUR, NITRU, WBCUA, LEUKOCYTESUR, GLUCOSEU  Cultures:-  -----------------------------------------------------------------    ABGs: No results found for: PHART, PO2ART, [id number]    Pulmonary Functions Testing Results:    Pulmonary function test  03/20/2019  Spirometry shows FEV1 is 1.27, 38 percent predicted consistent with severe obstructive ventilatory impairment, FVC is 2.3 9, 57 percent predicted. Lung volume shows RV is 3.83 164% predicted consistent with hyperinflation and air trapping, TLC is 6.33 99% predicted which is within normal limit. DLCO is 10.77 41% predicted consistent with severe reduction in diffusion capacity   Impression: This pulmonary function test is consistent with severe obstructive ventilatory impairment with hyperinflation/airway trapping on lung volume and severe reduction in diffusion capacity is likely secondary to emphysema and/or concomitant pulmonary vascular disease. Clinical correlation is recommended    Pulmonary function test  5/19/17 from [facility] : FEV1 0.96 27%, Post BD 36.3 31% change, FVC 2.60 58%, [id number] 48%, TLC 6.77 106%, DLCO 9.00 38%    CXR    CT SCANS CHEST    CT chest 06/08/2021. There is development of a right lower lobe nodular opacity with surrounding groundglass opacity. The solid component measures 1.5 x 0.8 cm. Development of an additional right upper lobe ill-defined nodular opacity with more subtle surrounding groundglass opacity. This measures approximately 1.8 x    0.6 cm. Stable 6 mm left upper lobe and 4 mm left lower lobe pulmonary nodules. Lungs and pleural spaces:     Extensive centrilobular emphysematous changes. No pleural effusion or pneumothorax. Diffuse bilateral peribronchial thickening and bilateral bronchiectasis. 06/02/2020  Emphysematous changes are seen unchanged. A 4 mm nodule in the left lower lobe and a 6 mm nodule in the left upper lobe are stable. A previously seen 5 mm left lower lobe nodule along the hemidiaphragm is no longer seen on the current study. 12/3/2019  *  6 mm left upper lobe nodule is unchanged since prior study.   Given recent abnormal PET uptake of this nodule, continued follow-up is recommended in 6 months with added attention to new 5 mm left lower lobe nodule at that time. *  Chronic findings as detailed above. 06/9/2019  6 mm left upper lobe nodule with surrounding groundglass opacity which is stable when compared to prior exam.  The other bilateral pulmonary nodules which were seen on the study of 3/7/2019 are no longer seen on the current exam and may have represented an inflammatory or infectious process which   has since resolved. Emphysematous changes and bronchiectasis which are stable when compared to prior study. High-resolution CT chest  03/07/19   hows right lower lobe area of nodule 1.2 X 1.2 cm along with some bronchiectasis in that area, left upper lobe small nodule 6 mm and in the right lower lobe posterior medial nodule 4 mm which apparently was not seen on the previous CT scan    06/26/2018  *Stable 3 mm solid noncalcified pulmonary nodule left lower lobe. *Emphysema.       06/21/2017  No concerning nodularity is identified. Incidental findings of advanced   pulmonary emphysema and coronary artery disease. CT scan chest from [facility]    07/07/2016  Impression:       1. Further decreased size left lower lobe pulmonary nodule which now        appears linear on coronal reconstructed images. This is felt to        represent likely benign finding. 2. New small nodule within left lower lobe also appears fairly linear        on coronal reconstructed images. This is probably also benign. Follow-       up in one years time could be obtained to document stability. 10/27/2015        New left lower lobe solid nodule measuring 6-7 mm on image 122 of the        axial images. Lungs and pleural spaces. Severe upper lung predominant emphysematous changes with mild central        and lower lung bronchiectasis. PET/CT scan 07/26/2021. [facility] facility report available.     IMPRESSION:   *  Recently seen new right upper lobe and right lower lobe pulmonary nodules demonstrate resolution and near complete resolution, respectively, as compared to recent CT chest dated 6/8/2021. There is no corresponding hypermetabolic activity. These findings are compatible with benign inflammatory/infectious process. *  Stable left upper lobe and left lower lobe subcentimeter pulmonary nodules without corresponding hypermetabolic activity. Follow-up CT chest recommended in one year. PET SCAN 04/3/2019  Findings:  The previously seen spiculated lesion in the posterior right lower lobe is substantially smaller than on prior study without significant uptake. The nodule in the left apex with surrounding groundglass opacity is redemonstrated. There is only low level uptake, approximately 1 SUV, there is increased relative to background long. Given the uptake relative to the small size, this remains concerning and follow-up chest CT is recommended. Abdomen and pelvis, neck are negative for abnormal uptake    IMPRESSION:    1. Low-level uptake in left apical lesion while nonspecific remains concerning given its small size. Follow-up CT recommended. 2.  There is been significant improvement in the right lower lobe opacity consistent with an inflammatory or infectious lesion         Immunization History   Administered Date(s) Administered    Influenza, Triv, 3 Years and older, IM, PF (Afluria 5yrs and older) 02/16/2017    Pneumococcal Conjugate 13-valent ([id number]) 02/16/2017     6 minute walk test 03/20/19  Distance walk 1250 feet 68% of predicted. Room air saturation at rest 98% lowest saturation after walking 6 minute was 93%. Assessment and Plan       ICD-10-CM    1. Lung nodules  R91.8       2. Centrilobular emphysema ([address]  J43.2       3. History of smoking at least 1 pack per day for at least 30 years  Z87.891       4.  Personal history of COVID-19  Z86.16           Patient Active Problem List   Diagnosis    CAD (coronary artery disease)    Essential hypertension Hyperlipidemia    S/P cardiac cath Patennt LAD & RCA stent 5/12/15-Dr. gutierrez         Assessment and discussion  He has history of waxing and waning lung nodules at different times and at different locations on multiple CT scans in the past.  CT scan of the chest which was done on 07/01/2022 at that time showed new right upper lobe nodular infiltrate Other area in right upper lobe anteriorly is the same which has been present and more of a fibrotic scarlike stable. CT scan of the chest for short-term follow-up is a scheduled and was done on 09/06/2022 which showed now the resolution of right upper lobe nodular infiltrate but showed a new left upper lobe nodule 6 mm and a new lingular nodule which was not present on CT scan of the chest on 07/01/2022. Rib    CT scan of the chest on 07/01/2022 seen and is compared to CT scan of 06/08/2021 and new right upper lobe anterior nodule was seen which has a small cavitary area. Since patient had COVID about around 2 weeks ago and has been having some symptoms at that time it is likely inflammatory he had a history of waxing and waning lung nodules which appear and disappear on previous CT scan. I would get CT scan of the chest in 6 to 8 weeks from the last CT scan that will be first week of September and follow-up in the office. There is another area of right upper lobe infiltrate septal thickening on CT scan on 07/01/2022 which is also present on CT scan on 06/08/2021 but decreased nodularity we will also follow-up on the CT scan in the next 6 to 8 weeks and determine if there is any change because this could be also infectious inflammatory. He has severe emphysematous bullous changes severe COPD we will try to avoid biopsy if possible as high risk for pneumothorax  He was sent to [facility] to evaluate for lung volume reduction surgery but they do not think that he will benefit from lung volume reduction surgery.       Plan:    CT scan of the chest reviewed and showed new left upper lobe and lingular nodules which were not present before. Finding discussed with patient. He will need short-term follow-up CT scan in 3 months  If CT scan at that time showed resolution of the left upper lobe and lingular nodule as he had history of intermittent waxing and waning nodules with appearance. Then will need yearly CT scan  Trelegy once daily to continue  Albuterol as needed  Flonase nasal spray to continue as needed  Had both the doses of covid vaccine and boosters   Refused flu vaccine  Annual flu vaccine recommended  Uptodate on Pneumonia vaccine/ prevnar 13    Maintain an active lifestyle   Smoking cessation to continue    Follow up in 3 months (1 weeks after CT scan)    Questions answered to pt's satisfaction    It was my pleasure to evaluate Winston Carmelita today. Please call with questions. Please note that this chart was generated using voice recognition Dragon dictation software. Although every effort was made to ensure the accuracy of this automated transcription, some errors in transcription may have occurred.     Jocelyn Fairbanks MD, MD             9/13/2022, 11:07 AM

## 2022-12-13 ENCOUNTER — HOSPITAL ENCOUNTER (OUTPATIENT)
Dept: CT IMAGING | Age: 67
Discharge: HOME | End: 2022-12-15
Payer: MEDICARE

## 2022-12-13 DIAGNOSIS — R91.8 LUNG NODULES: ICD-10-CM

## 2022-12-13 PROCEDURE — 71250 CT THORAX DX C-: CPT

## 2023-01-06 ENCOUNTER — OFFICE VISIT (OUTPATIENT)
Dept: PULMONOLOGY | Age: 68
End: 2023-01-06
Payer: MEDICARE

## 2023-01-06 VITALS
SYSTOLIC BLOOD PRESSURE: 115 MMHG | OXYGEN SATURATION: 97 % | RESPIRATION RATE: 16 BRPM | HEART RATE: 52 BPM | WEIGHT: 187 LBS | HEIGHT: 69 IN | DIASTOLIC BLOOD PRESSURE: 74 MMHG | BODY MASS INDEX: 27.7 KG/M2

## 2023-01-06 DIAGNOSIS — J43.2 CENTRILOBULAR EMPHYSEMA (HCC): ICD-10-CM

## 2023-01-06 DIAGNOSIS — Z87.891 HISTORY OF SMOKING AT LEAST 1 PACK PER DAY FOR AT LEAST 30 YEARS: ICD-10-CM

## 2023-01-06 DIAGNOSIS — R91.8 LUNG NODULES: Primary | ICD-10-CM

## 2023-01-06 PROCEDURE — 99214 OFFICE O/P EST MOD 30 MIN: CPT | Performed by: INTERNAL MEDICINE

## 2023-01-06 PROCEDURE — G8484 FLU IMMUNIZE NO ADMIN: HCPCS | Performed by: INTERNAL MEDICINE

## 2023-01-06 PROCEDURE — 1123F ACP DISCUSS/DSCN MKR DOCD: CPT | Performed by: INTERNAL MEDICINE

## 2023-01-06 PROCEDURE — 3078F DIAST BP <80 MM HG: CPT | Performed by: INTERNAL MEDICINE

## 2023-01-06 PROCEDURE — 3017F COLORECTAL CA SCREEN DOC REV: CPT | Performed by: INTERNAL MEDICINE

## 2023-01-06 PROCEDURE — 3023F SPIROM DOC REV: CPT | Performed by: INTERNAL MEDICINE

## 2023-01-06 PROCEDURE — 1036F TOBACCO NON-USER: CPT | Performed by: INTERNAL MEDICINE

## 2023-01-06 PROCEDURE — G8427 DOCREV CUR MEDS BY ELIG CLIN: HCPCS | Performed by: INTERNAL MEDICINE

## 2023-01-06 PROCEDURE — G8417 CALC BMI ABV UP PARAM F/U: HCPCS | Performed by: INTERNAL MEDICINE

## 2023-01-06 PROCEDURE — 3074F SYST BP LT 130 MM HG: CPT | Performed by: INTERNAL MEDICINE

## 2023-01-06 NOTE — PROGRESS NOTES
OUTPATIENT PULMONARY PROGRESS NOTE      Patient:  Makenzie Oquendo  MRN: [id number]    Consulting Physician: Dr Payal Frank  Reason for Consult: Dyspnea, COPD  Primacy Care Physician: Yadi Borges MD    HISTORY OF PRESENT ILLNESS:   The patient is a 74 y.o. male   He is here for follow-up today, he has history of severe  COPD/Emphysema and history of lung nodules/scarring which was stable for 2 years on CT scan in the past.    He was seen last time in September he had a CT scan in early September which shows  Lingular nodular infiltrate and lingula nodule so he was scheduled for a follow-up CT scan of the chest and follow-up in the office. CT scan of the chest done on 12/13/2022 and is compared to CT scan of the chest on 09/06/2022 3 months earlier now the lingular nodule and lingular nodular infiltrate/small cluster of nodule CT scan 12/13 as compare to 9/06 lingular nodule is not seen on the CT scan and mostly resolved other nodule in left apex is a stable. He had history of waxing and waning nodules at different times on different CT scan and some of them disappeared on follow-up CT scans. Since he was seen last time he denies any change in his breathing he does have dyspnea on exertion activity sometime he is able to walk half a block symptom he has to stop. Environment and weather changes bother him when it is cold but it is humid or variation whether causing him to have more shortness of breath. Mostly he denies any change in symptoms he does not complain of cough he does have wheezing off and on which has been stable much worse with dry weather. He denies nocturnal awakening with cough wheezing chest tightness or shortness of breath. He denies any weight loss fever night sweats or chest pain.     Recent previous CT scans  CT scan of the chest which was done on 07/01/2022 showed new right upper lobe nodular infiltrate Other area in right upper lobe anteriorly is the same which has been present and more of a fibrotic scarlike stable. CT scan of the chest for short-term follow-up  done on 09/06/2022 showed now the resolution of right upper lobe nodular infiltrate but showed a new left upper lobe nodule 6 mm and a new lingular nodule which was not present on CT scan of the chest on 07/01/2022. He was referred for pulmonary rehabilitation in the past but he stopped as he wanted to do exercises at home. Previous work-up in 2021  He had a PET scan on 01/26/2021 as a CT scan of the chest on 06/08/2021 showed new right upper lobe and new right lower lobe infiltrate with surrounding inflammatory changes. PET CT was done on 07/26/2021 in [facility] facility according to detailed CT report previously seen 1.8 cm right upper lobe nodule and 1.5 cm right lower lobe nodule with inflammatory changes had resolved on PET/CT. Previously seen left lung nodules 4 mm and 6 mm right stable. CT scan was done on 06/08/2021 in [address] [place] [address] and which showed 2 areas 1 in right lower lobe with a solid central component and surrounding groundglass infiltrate posteriorly. There is another area in right upper lobe just close to the fissure laterally which is more of a longitudinal area of about 1.5 cm about 8 mm. These areas were not present on the CT scan in June 2020 and were new since then. Previous CT scan chest  He had CT scans for follow-up of left upper lobe lung nodule/left lower lobe nodule and new left lower lobe nodule seen in December 2019, as compared to CT scan in June 2019 the CT scan 6 months later in December 2019 did not show any change in the left upper lobe nodule but shows a new 5 mm left lower lobe nodule adjacent to the diaphragm.  CT scan chest on 06/02/20 showed no change in 6 mm SARA nodule and 4 mm LLL nodule but another 5 mm left lower lobe nodule above the diaphragm seen in December 2019 not seen any more on CT scan on 06/02/2020       He was referred in the past to [facility] clinic for opinion regarding lung volume reduction surgery especially with left upper lobe nodule. When he was seen in [facility] clinic he was clinically and symptomatically stable for many years and he was deemed not a candidate for surgery at this time and can be followed up in the future, he had a pulmonary function test done which was consistent with severe COPD without much change from previous function test which were done here in the past.  Since he was seen last time he had a CT scan of the chest which was ordered on last visit to be done in June and it was done on June 9 which showed resolution of right lower lobe spiculated nodule which was seen in March, also shows other nodular area in lower lungs which resolved and possibly bronchiectasis, left upper lobe nodule 6 mm is stable as compared to March. Previous visits/work-up  He had screening CT scan done in June 2018 which showed stable 3 mm left lower lobe nodule, in March 2019 a high-resolution CT chest was ordered for evaluation of lung volume reduction surgery and/or transplant evaluation and also pulmonary function test and alpha 1 antitrypsin phenotype was ordered. High-resolution CT of the chest on 03/07/19 shows right lower lobe area of nodular infiltrate 1.2 X 1.2 cm along with some bronchiectasis in that area, left upper lobe small nodule 6 mm and in the right lower lobe posterior medial nodule 4 mm which apparently was not seen on the previous CT scan. PET scan was done on 04/02/19 which showed the right lower lobe area of infiltrate atelectasis is smaller and no uptake there as it is decreasing in size and not FDG avid suggestive of inflammatory origin, the left upper lobe nodule shows slight uptake although uptake was low but considering the small size of the nodule (6 mm) it was still considered concerning.     Alpha 1 antitrypsin phenotype is PI MM  Pulmonary function test done last visit shows severe obstructive impairment with air trapping and severe reduction in diffusion capacity. 6 minute walk test did not show excise-induced hypoxia did require long-term oxygen therapy. Initial history and evaluation  H/O COPD diagnosed since 2011 when he had also stent placed for CAD by Dr Miroslava Valencia since 2011  Stopped smoking about a year ago and he had a smoke 2 pack per day for 49-50 years  H/O LL lung nodule and had ct scan in 2014, 2015 and 2016 in [place]      Past Medical History:        Diagnosis Date    CAD (coronary artery disease) 2011    stents    Centrilobular emphysema ([address]     COPD (chronic obstructive pulmonary disease) ([address]     COPD exacerbation ([address] 2/15/2017    COVID     Emphysema of lung ([facility]     Examination of participant in clinical trial 5/20/13    6 year follow up, estimated completion JUN 2019    Hyperlipidemia     Hypokalemia     Lung nodule     Oral thrush 11/3/2016    Thrush     Tongue irritation 11/3/2016       Past Surgical History:        Procedure Laterality Date    CARDIAC CATHETERIZATION  2011, 2013    CORONARY ANGIOPLASTY WITH STENT PLACEMENT  2011    x2     CORONARY ANGIOPLASTY WITH STENT PLACEMENT  5/2013    x1    HERNIA REPAIR         Allergies:     Allergies   Allergen Reactions    Pcn [Penicillins]          Home Meds:   Outpatient Encounter Medications as of 1/6/2023   Medication Sig Dispense Refill    TRELEGY ELLIPTA 100-62.5-25 MCG/INH AEPB TAKE 1 PUFF BY MOUTH EVERY DAY 3 each 3    metoprolol tartrate (LOPRESSOR) 25 MG tablet Take 25 mg by mouth 2 times daily      metFORMIN (GLUCOPHAGE) 500 MG tablet Take 500 mg by mouth 2 times daily (with meals)      dilTIAZem (CARDIZEM CD) 120 MG extended release capsule Take 240 mg by mouth daily       rosuvastatin (CRESTOR) 10 MG tablet Take 10 mg by mouth daily      albuterol sulfate HFA (PROVENTIL HFA) 108 (90 BASE) MCG/ACT inhaler Inhale 2 puffs into the lungs every 6 hours as needed for Wheezing or Shortness of Breath 1 Inhaler 0    prasugrel (EFFIENT) 10 MG TABS Take 10 mg by mouth daily Flaxseed, Linseed, (FLAXSEED OIL) 1000 MG CAPS Take 1 capsule by mouth daily       aspirin 81 MG tablet Take 81 mg by mouth daily. No facility-administered encounter medications on file as of 1/6/2023. Social History:   TOBACCO:   reports that he quit smoking in February 2017. 2 ppd for 45 years and then 1 ppd. He has never used smokeless tobacco.  ETOH:   reports no history of alcohol use.   OCCUPATION:      Family History:       Problem Relation Age of Onset    Heart Defect Father        Immunizations:    Immunization History   Administered Date(s) Administered    Influenza, Triv, 3 Years and older, IM, PF (Afluria 5yrs and older) 02/16/2017    Pneumococcal Conjugate 13-valent (Anselm Zamora) 02/16/2017         REVIEW OF SYSTEMS:  CONSTITUTIONAL:  negative for  fevers, chills, sweats, fatigue, malaise, anorexia and weight loss  EYES:  negative for  double vision, blurred vision, visual disturbance and redness  HEENT:  negative for  hearing loss, tinnitus, ear drainage, epistaxis, snoring, sore mouth, hoarseness and voice change  RESPIRATORY:  Positive for dyspnea on exertion, positive for intermittent mild  wheezing, negative for dry cough, cough with sputum, hemoptysis, chest pain, pleuritic pain and cyanosis  CARDIOVASCULAR:  Positive for  dyspnea on exertion, negative for  chest pain, palpitations, orthopnea, PND, exertional chest pressure/discomfort, fatigue, early saiety, edema, syncope  GASTROINTESTINAL:  negative for nausea, vomiting, change in bowel habits, diarrhea, constipation, abdominal pain, abdominal distention, jaundice, dysphagia, reflux, regurgitation, odynophagia, hematemesis and hemtochezia  GENITOURINARY:  Negative for frequency, dysuria, nocturia, urinary incontinence, hesitancy, decreased stream and hematuria  HEMATOLOGIC/LYMPHATIC:  negative for easy bruising, bleeding, lymphadenopathy and petechiae  ALLERGIC/IMMUNOLOGIC:  negative for recurrent infections, urticaria, hay fever, angioedema, anaphylaxis and drug reactions  ENDOCRINE:  negative for heat intolerance, cold intolerance, tremor, weight changes and change in bowel habits  MUSCULOSKELETAL: Negative for joint pain, negative for  myalgias, arthralgias, pain, joint swelling, decreased range of motion and muscle weakness  NEUROLOGICAL:  negative for headaches, dizziness, seizures, memory problems, speech problems, visual disturbance, gait problems, tremor, dysphagia, weakness, numbness, syncope, near syncope and tingling  BEHAVIOR/PSYCH:  negative          Physical Exam:    Vitals: /74 (Site: Left Upper Arm)   Pulse 52   Resp 16   Ht 5' 9\" (1.753 m)   Wt 187 lb (84.8 kg)   SpO2 97% Comment: Room air at Rest  BMI 27.62 kg/m²   Last 3 weights: Wt Readings from Last 3 Encounters:   01/06/23 187 lb (84.8 kg)   09/13/22 186 lb (84.4 kg)   02/18/22 196 lb (88.9 kg)     Body mass index is 27.62 kg/m².     Physical Examination:   General appearance - alert, well appearing, and in no distress and normal appearing weight  Mental status - alert, oriented to person, place, and time  Eyes - sclera anicteric, left eye normal, right eye normal  Ears - right ear normal, left ear normal  Nose - normal and patent, no erythema, discharge or polyps  Mouth - mucous membranes moist, pharynx normal without lesions  Neck - supple, no significant adenopathy  Chest - no tachypnea, retractions or cyanosis, Increase resonance on percussion bilaterally, no wheezing noted, decreased air entry noted and distant breath sounds bilaterally, no rales  Heart - normal rate, regular rhythm, normal S1, S2, no murmurs, rubs, clicks or gallops  Abdomen - soft, nontender, nondistended, no masses or organomegaly  Neurological - alert, oriented, normal speech, no focal findings or movement disorder noted  Extremities - peripheral pulses normal, no pedal edema, no clubbing or cyanosis  Skin - normal coloration and turgor, no rashes, no suspicious skin lesions noted LABS:    CBC:   WBC   Date Value Ref Range Status   02/17/2017 16.2 (H) 3.5 - 11.0 k/uL Final   02/16/2017 12.0 (H) 3.5 - 11.0 k/uL Final   02/15/2017 14.1 (H) 3.5 - 11.0 k/uL Final     Hemoglobin   Date Value Ref Range Status   02/17/2017 12.8 (L) 13.5 - 17.5 g/dL Final   02/16/2017 13.0 (L) 13.5 - 17.5 g/dL Final   02/15/2017 13.8 13.5 - 17.5 g/dL Final     Platelets   Date Value Ref Range Status   02/17/2017 444 (H) 130 - 400 k/uL Final   02/16/2017 441 (H) 130 - 400 k/uL Final   02/15/2017 415 (H) 130 - 400 k/uL Final     BMP:   Sodium   Date Value Ref Range Status   02/17/2017 139 135 - 144 mmol/L Final   02/16/2017 135 135 - 144 mmol/L Final   02/15/2017 134 (L) 135 - 144 mmol/L Final     Potassium   Date Value Ref Range Status   02/17/2017 4.1 3.7 - 5.3 mmol/L Final   02/16/2017 4.1 3.7 - 5.3 mmol/L Final   02/15/2017 3.6 (L) 3.7 - 5.3 mmol/L Final     Chloride   Date Value Ref Range Status   02/17/2017 102 98 - 107 mmol/L Final   02/16/2017 99 98 - 107 mmol/L Final   02/15/2017 91 (L) 98 - 107 mmol/L Final     CO2   Date Value Ref Range Status   02/17/2017 26 20 - 31 mmol/L Final   02/16/2017 25 20 - 31 mmol/L Final   02/15/2017 23 20 - 31 mmol/L Final     BUN   Date Value Ref Range Status   02/17/2017 16 6 - 20 mg/dL Final   02/16/2017 11 6 - 20 mg/dL Final   02/15/2017 12 6 - 20 mg/dL Final     Creatinine   Date Value Ref Range Status   02/17/2017 0.76 0.70 - 1.20 mg/dL Final   02/16/2017 0.70 0.70 - 1.20 mg/dL Final   02/15/2017 0.76 0.70 - 1.20 mg/dL Final     Glucose   Date Value Ref Range Status   02/17/2017 125 (H) 70 - 99 mg/dL Final   02/16/2017 150 (H) 70 - 99 mg/dL Final   02/15/2017 166 (H) 70 - 99 mg/dL Final     Hepatic:     Amylase: No results found for: AMYLASE  Lipase: No results found for: LIPASE  CARDIAC ENZYMES:     BNP: No results found for: BNP  Lipids:       INR: No results found for: INR  Thyroid: No results found for: T4, TSH  Urinalysis: No results found for: BACTERIA, [place] Emperor, COLORU, PHUR, PROTEINU, RBCUA, SPECGRAV, BILIRUBINUR, NITRU, WBCUA, LEUKOCYTESUR, GLUCOSEU  Cultures:-  -----------------------------------------------------------------    ABGs: No results found for: PHART, PO2ART, [id number]    Pulmonary Functions Testing Results:    Pulmonary function test  03/20/2019  Spirometry shows FEV1 is 1.27, 38 percent predicted consistent with severe obstructive ventilatory impairment, FVC is 2.3 9, 57 percent predicted. Lung volume shows RV is 3.83 164% predicted consistent with hyperinflation and air trapping, TLC is 6.33 99% predicted which is within normal limit. DLCO is 10.77 41% predicted consistent with severe reduction in diffusion capacity   Impression: This pulmonary function test is consistent with severe obstructive ventilatory impairment with hyperinflation/airway trapping on lung volume and severe reduction in diffusion capacity is likely secondary to emphysema and/or concomitant pulmonary vascular disease. Clinical correlation is recommended    Pulmonary function test  5/19/17 from [facility] : FEV1 0.96 27%, Post BD 36.3 31% change, FVC 2.60 58%, OAF7HPE 48%, TLC 6.77 106%, DLCO 9.00 38%    CXR    CT SCANS CHEST    CT chest 06/08/2021. There is development of a right lower lobe nodular opacity with surrounding groundglass opacity. The solid component measures 1.5 x 0.8 cm. Development of an additional right upper lobe ill-defined nodular opacity with more subtle surrounding groundglass opacity. This measures approximately 1.8 x    0.6 cm. Stable 6 mm left upper lobe and 4 mm left lower lobe pulmonary nodules. Lungs and pleural spaces:     Extensive centrilobular emphysematous changes. No pleural effusion or pneumothorax. Diffuse bilateral peribronchial thickening and bilateral bronchiectasis. 06/02/2020  Emphysematous changes are seen unchanged. A 4 mm nodule in the left lower lobe and a 6 mm nodule in the left upper lobe are stable.   A previously seen 5 mm left lower lobe nodule along the hemidiaphragm is no longer seen on the current study. 12/3/2019  *  6 mm left upper lobe nodule is unchanged since prior study. Given recent abnormal PET uptake of this nodule, continued follow-up is recommended in 6 months with added attention to new 5 mm left lower lobe nodule at that time. *  Chronic findings as detailed above. 06/9/2019  6 mm left upper lobe nodule with surrounding groundglass opacity which is stable when compared to prior exam.  The other bilateral pulmonary nodules which were seen on the study of 3/7/2019 are no longer seen on the current exam and may have represented an inflammatory or infectious process which   has since resolved. Emphysematous changes and bronchiectasis which are stable when compared to prior study. High-resolution CT chest  03/07/19   hows right lower lobe area of nodule 1.2 X 1.2 cm along with some bronchiectasis in that area, left upper lobe small nodule 6 mm and in the right lower lobe posterior medial nodule 4 mm which apparently was not seen on the previous CT scan    06/26/2018  *Stable 3 mm solid noncalcified pulmonary nodule left lower lobe. *Emphysema.       06/21/2017  No concerning nodularity is identified. Incidental findings of advanced   pulmonary emphysema and coronary artery disease. CT scan chest from [facility]    07/07/2016  Impression:       1. Further decreased size left lower lobe pulmonary nodule which now        appears linear on coronal reconstructed images. This is felt to        represent likely benign finding. 2. New small nodule within left lower lobe also appears fairly linear        on coronal reconstructed images. This is probably also benign. Follow-       up in one years time could be obtained to document stability. 10/27/2015        New left lower lobe solid nodule measuring 6-7 mm on image 122 of the        axial images.        Lungs and pleural spaces. Severe upper lung predominant emphysematous changes with mild central        and lower lung bronchiectasis. PET/CT scan 07/26/2021. [facility] facility report available. IMPRESSION:   *  Recently seen new right upper lobe and right lower lobe pulmonary nodules demonstrate resolution and near complete resolution, respectively, as compared to recent CT chest dated 6/8/2021. There is no corresponding hypermetabolic activity. These findings are compatible with benign inflammatory/infectious process. *  Stable left upper lobe and left lower lobe subcentimeter pulmonary nodules without corresponding hypermetabolic activity. Follow-up CT chest recommended in one year. PET SCAN 04/3/2019  Findings:  The previously seen spiculated lesion in the posterior right lower lobe is substantially smaller than on prior study without significant uptake. The nodule in the left apex with surrounding groundglass opacity is redemonstrated. There is only low level uptake, approximately 1 SUV, there is increased relative to background long. Given the uptake relative to the small size, this remains concerning and follow-up chest CT is recommended. Abdomen and pelvis, neck are negative for abnormal uptake    IMPRESSION:    1. Low-level uptake in left apical lesion while nonspecific remains concerning given its small size. Follow-up CT recommended. 2.  There is been significant improvement in the right lower lobe opacity consistent with an inflammatory or infectious lesion         Immunization History   Administered Date(s) Administered    Influenza, Triv, 3 Years and older, IM, PF (Afluria 5yrs and older) 02/16/2017    Pneumococcal Conjugate 13-valent ([id number]) 02/16/2017     6 minute walk test 03/20/19  Distance walk 1250 feet 68% of predicted. Room air saturation at rest 98% lowest saturation after walking 6 minute was 93%. Assessment and Plan       ICD-10-CM    1. Lung nodules  R91.8       2. Centrilobular emphysema ([facility]  J43.2       3. History of smoking at least 1 pack per day for at least 30 years  Z87.891           Patient Active Problem List   Diagnosis    CAD (coronary artery disease)    Essential hypertension    Hyperlipidemia    S/P cardiac cath Patennt LAD & RCA stent 5/12/15-Dr. gutierrez         Assessment and discussion  He has history of waxing and waning lung nodules at different times and at different locations on multiple CT scans in the past.CT scan of the chest done on 12/13/2022 and is compared to CT scan of the chest on 09/06/2022 3 months earlier now the lingular nodule and lingular nodular infiltrate/small cluster of nodule CT scan 12/13 as compare to 9/06 lingular nodule is not seen on the CT scan and mostly resolved other nodule in left apex is a stable. He had history of waxing and waning nodules at different times on different CT scan and some of them disappeared on follow-up CT scans. Since there is no new nodule he will need CT scan of the chest in 1 year and will be back to low-dose screening CT scan    CT scan of the chest which was done on 07/01/2022 at that time showed new right upper lobe nodular infiltrate Other area in right upper lobe anteriorly is the same which has been present and more of a fibrotic scarlike stable. CT scan of the chest for short-term follow-up is a scheduled and was done on 09/06/2022 which showed now the resolution of right upper lobe nodular infiltrate but showed a new left upper lobe nodule 6 mm and a new lingular nodule which was not present on CT scan of the chest on 07/01/2022. He was sent to [facility] clinic to evaluate for lung volume reduction surgery but they do not think that he will benefit from lung volume reduction surgery. Plan:      As CT scan at in December 2020 showed resolution of the left upper lobe and lingular nodule as he had history of intermittent waxing and waning nodules with appearance.   He will need yearly CT scan and next 1 should be in December 2023  Trelegy once daily to continue. Albuterol as needed  Flonase nasal spray to continue as needed  Had both the doses of covid vaccine and boosters   Refused flu vaccine  Annual flu vaccine recommended  Uptodate on Pneumonia vaccine/ prevnar 13    Maintain an active lifestyle   Smoking cessation to continue    Follow up in 6 months     Questions answered to pt's satisfaction    It was my pleasure to evaluate An Corporal today. Please call with questions. Please note that this chart was generated using voice recognition Dragon dictation software. Although every effort was made to ensure the accuracy of this automated transcription, some errors in transcription may have occurred.     Renato Gibbons MD, MD             1/6/2023, 1:17 PM

## 2023-03-03 RX ORDER — FLUTICASONE FUROATE, UMECLIDINIUM BROMIDE AND VILANTEROL TRIFENATATE 100; 62.5; 25 UG/1; UG/1; UG/1
POWDER RESPIRATORY (INHALATION)
Qty: 3 EACH | Refills: 2 | Status: SHIPPED | OUTPATIENT
Start: 2023-03-03

## 2023-03-03 NOTE — TELEPHONE ENCOUNTER
LAST VISIT: 1/6/23  NEXT VISIT: 7/11/23    Per last dictation patient to continue this medication. Please sign for refill if ok. Thank you.

## 2023-07-11 ENCOUNTER — OFFICE VISIT (OUTPATIENT)
Dept: PULMONOLOGY | Age: 68
End: 2023-07-11
Payer: MEDICARE

## 2023-07-11 VITALS
WEIGHT: 188.2 LBS | BODY MASS INDEX: 27.88 KG/M2 | OXYGEN SATURATION: 97 % | DIASTOLIC BLOOD PRESSURE: 76 MMHG | HEART RATE: 53 BPM | RESPIRATION RATE: 16 BRPM | HEIGHT: 69 IN | SYSTOLIC BLOOD PRESSURE: 115 MMHG

## 2023-07-11 DIAGNOSIS — J43.2 CENTRILOBULAR EMPHYSEMA (HCC): ICD-10-CM

## 2023-07-11 DIAGNOSIS — Z86.16 PERSONAL HISTORY OF COVID-19: ICD-10-CM

## 2023-07-11 DIAGNOSIS — R91.8 LUNG NODULES: Primary | ICD-10-CM

## 2023-07-11 DIAGNOSIS — Z87.891 HISTORY OF SMOKING AT LEAST 1 PACK PER DAY FOR AT LEAST 30 YEARS: ICD-10-CM

## 2023-07-11 DIAGNOSIS — Z87.891 PERSONAL HISTORY OF TOBACCO USE: ICD-10-CM

## 2023-07-11 PROCEDURE — 3078F DIAST BP <80 MM HG: CPT | Performed by: INTERNAL MEDICINE

## 2023-07-11 PROCEDURE — G0296 VISIT TO DETERM LDCT ELIG: HCPCS | Performed by: INTERNAL MEDICINE

## 2023-07-11 PROCEDURE — 99214 OFFICE O/P EST MOD 30 MIN: CPT | Performed by: INTERNAL MEDICINE

## 2023-07-11 PROCEDURE — G8417 CALC BMI ABV UP PARAM F/U: HCPCS | Performed by: INTERNAL MEDICINE

## 2023-07-11 PROCEDURE — 3017F COLORECTAL CA SCREEN DOC REV: CPT | Performed by: INTERNAL MEDICINE

## 2023-07-11 PROCEDURE — 3074F SYST BP LT 130 MM HG: CPT | Performed by: INTERNAL MEDICINE

## 2023-07-11 PROCEDURE — 1123F ACP DISCUSS/DSCN MKR DOCD: CPT | Performed by: INTERNAL MEDICINE

## 2023-07-11 PROCEDURE — G8427 DOCREV CUR MEDS BY ELIG CLIN: HCPCS | Performed by: INTERNAL MEDICINE

## 2023-07-11 PROCEDURE — 1036F TOBACCO NON-USER: CPT | Performed by: INTERNAL MEDICINE

## 2023-07-11 PROCEDURE — 3023F SPIROM DOC REV: CPT | Performed by: INTERNAL MEDICINE

## 2023-07-11 NOTE — PROGRESS NOTES
Flaxseed, Linseed, (FLAXSEED OIL) 1000 MG CAPS Take 1 capsule by mouth daily       aspirin 81 MG tablet Take 1 tablet by mouth daily       No facility-administered encounter medications on file as of 7/11/2023. Social History:   TOBACCO:   reports that he quit smoking in February 2017. 2 ppd for 45 years and then 1 ppd. He has never used smokeless tobacco.  ETOH:   reports no history of alcohol use.   OCCUPATION:      Family History:       Problem Relation Age of Onset    Heart Defect Father        Immunizations:    Immunization History   Administered Date(s) Administered    Influenza, Triv, 3 Years and older, IM, PF (Afluria 5yrs and older) 02/16/2017    Pneumococcal, PCV-13, PREVNAR 15, (age 6w+), IM, 0.5mL 02/16/2017         REVIEW OF SYSTEMS:  CONSTITUTIONAL:  negative for  fevers, chills, sweats, fatigue, malaise, anorexia and weight loss  EYES:  negative for  double vision, blurred vision, visual disturbance and redness  HEENT:  negative for  hearing loss, tinnitus, ear drainage, epistaxis, snoring, sore mouth, hoarseness and voice change  RESPIRATORY:  Positive for dyspnea on exertion, negative for wheezing, negative for dry cough, cough with sputum, hemoptysis, chest pain, pleuritic pain and cyanosis  CARDIOVASCULAR:  Positive for  dyspnea on exertion, negative for  chest pain, palpitations, orthopnea, PND, exertional chest pressure/discomfort, fatigue, early saiety, edema, syncope  GASTROINTESTINAL:  negative for nausea, vomiting, change in bowel habits, diarrhea, constipation, abdominal pain, abdominal distention, jaundice, dysphagia, reflux, regurgitation, odynophagia, hematemesis and hemtochezia  GENITOURINARY:  Negative for frequency, dysuria, nocturia, urinary incontinence, hesitancy, decreased stream and hematuria  HEMATOLOGIC/LYMPHATIC:  negative for easy bruising, bleeding, lymphadenopathy and petechiae  ALLERGIC/IMMUNOLOGIC:  negative for recurrent infections, urticaria, hay fever,

## 2023-11-17 RX ORDER — FLUTICASONE FUROATE, UMECLIDINIUM BROMIDE AND VILANTEROL TRIFENATATE 100; 62.5; 25 UG/1; UG/1; UG/1
POWDER RESPIRATORY (INHALATION)
Qty: 180 EACH | Refills: 2 | Status: SHIPPED | OUTPATIENT
Start: 2023-11-17

## 2023-12-08 ENCOUNTER — TELEPHONE (OUTPATIENT)
Dept: ONCOLOGY | Age: 68
End: 2023-12-08

## 2024-01-16 ENCOUNTER — OFFICE VISIT (OUTPATIENT)
Dept: PULMONOLOGY | Age: 69
End: 2024-01-16
Payer: MEDICARE

## 2024-01-16 VITALS
HEIGHT: 69 IN | SYSTOLIC BLOOD PRESSURE: 120 MMHG | HEART RATE: 60 BPM | DIASTOLIC BLOOD PRESSURE: 69 MMHG | BODY MASS INDEX: 27.55 KG/M2 | OXYGEN SATURATION: 95 % | WEIGHT: 186 LBS | RESPIRATION RATE: 15 BRPM

## 2024-01-16 DIAGNOSIS — R91.8 LUNG NODULES: Primary | ICD-10-CM

## 2024-01-16 DIAGNOSIS — J20.9 ACUTE BRONCHITIS, UNSPECIFIED ORGANISM: ICD-10-CM

## 2024-01-16 DIAGNOSIS — Z87.891 HISTORY OF SMOKING AT LEAST 1 PACK PER DAY FOR AT LEAST 30 YEARS: ICD-10-CM

## 2024-01-16 DIAGNOSIS — J43.2 CENTRILOBULAR EMPHYSEMA (HCC): ICD-10-CM

## 2024-01-16 PROCEDURE — 94726 PLETHYSMOGRAPHY LUNG VOLUMES: CPT | Performed by: INTERNAL MEDICINE

## 2024-01-16 PROCEDURE — 99214 OFFICE O/P EST MOD 30 MIN: CPT | Performed by: INTERNAL MEDICINE

## 2024-01-16 PROCEDURE — 94729 DIFFUSING CAPACITY: CPT | Performed by: INTERNAL MEDICINE

## 2024-01-16 PROCEDURE — G8484 FLU IMMUNIZE NO ADMIN: HCPCS | Performed by: INTERNAL MEDICINE

## 2024-01-16 PROCEDURE — 3078F DIAST BP <80 MM HG: CPT | Performed by: INTERNAL MEDICINE

## 2024-01-16 PROCEDURE — G8417 CALC BMI ABV UP PARAM F/U: HCPCS | Performed by: INTERNAL MEDICINE

## 2024-01-16 PROCEDURE — 3023F SPIROM DOC REV: CPT | Performed by: INTERNAL MEDICINE

## 2024-01-16 PROCEDURE — 1123F ACP DISCUSS/DSCN MKR DOCD: CPT | Performed by: INTERNAL MEDICINE

## 2024-01-16 PROCEDURE — 94375 RESPIRATORY FLOW VOLUME LOOP: CPT | Performed by: INTERNAL MEDICINE

## 2024-01-16 PROCEDURE — 3074F SYST BP LT 130 MM HG: CPT | Performed by: INTERNAL MEDICINE

## 2024-01-16 PROCEDURE — 94618 PULMONARY STRESS TESTING: CPT | Performed by: INTERNAL MEDICINE

## 2024-01-16 PROCEDURE — 1036F TOBACCO NON-USER: CPT | Performed by: INTERNAL MEDICINE

## 2024-01-16 PROCEDURE — G8427 DOCREV CUR MEDS BY ELIG CLIN: HCPCS | Performed by: INTERNAL MEDICINE

## 2024-01-16 PROCEDURE — 3017F COLORECTAL CA SCREEN DOC REV: CPT | Performed by: INTERNAL MEDICINE

## 2024-01-16 RX ORDER — PREDNISONE 20 MG/1
40 TABLET ORAL DAILY
Qty: 10 TABLET | Refills: 0 | Status: SHIPPED | OUTPATIENT
Start: 2024-01-16 | End: 2024-01-21

## 2024-01-16 RX ORDER — AZITHROMYCIN 250 MG/1
250 TABLET, FILM COATED ORAL SEE ADMIN INSTRUCTIONS
Qty: 6 TABLET | Refills: 0 | Status: SHIPPED | OUTPATIENT
Start: 2024-01-16 | End: 2024-01-21

## 2024-01-16 NOTE — PROGRESS NOTES
[facility], [facility].  [address]  [facility] [address]    Oximetry Report  Testing Date: 01/16/24      Name: Shantell Walsh    Age: 68 y.o.     Gender: male   Diagnosis:   1. Lung nodules                                              Weight: 186 lb                                                    Height: 69 in    Smoke HX:  reports that he quit smoking about 6 years ago. His smoking use included cigarettes. He started smoking about 57 years ago. He has a 50.1 pack-year smoking history. He has never used smokeless tobacco.                                                            Max - Target  Heart Rate 183 / 146  Inspired O2 SP02% Max Heart Rate Assist Device Activity Pace Distance Walked (ft) Time (min.)   R/A 95 60  rest      R/A 88 75  Walk  Slow normal  750 3   N/C-2 94 77  Walk  Slow normal  500 2   N/C-3          N/C          R/A= Roomed Air, NC = Oxygen by Nasal Cannula    Distance Walk Predicted Distance LLN** Predicted % Duration (min) Duration of Stops Sec Vishal Dyspnea Vishal Fatigue   1250 1765 1263  71 1 min  3 3   **LLN= Lower limits of normal **LLN= Lower limits of normal  (from Maria A and Melly, American Journal of Respiratory and Critical Care Medicine;158:1384-87)       Comments: The patient walked for 6 minutes at a slow normal pace. He walked for 750 ft on room air and his oxygen saturation 88%. He was started an oxygen at flow of 2 with a conserving device to keep his saturation above 90%. He  did exhibit signs of dyspnea and did complain.        Patient was not receptive of oxygen

## 2024-01-16 NOTE — PROGRESS NOTES
Pulmonary function test.  Date of study 01/16/2024.      Spirometry shows FEV1 is 1.16 36% predicted consistent with severe obstructive ventilatory impairment.  FVC is 2.21 55% predicted.  FEV1 FVC ratio and flow-volume loop is consistent with severe obstructive ventilatory impairment.  Lung volumes shows residual volume is 3.45 143% predicted consistent with moderate airway trapping.  Total capacity is 5.98 95% predicted which is within normal limit.  Diffusion capacity 7.89 31% predicted which is consistent with severe reduction diffusion capacity likely secondary to emphysema and or concomitant pulmonary vascular disease, anemia or intraparenchymal lung disease.      Impression:      This pulmonary function test is consistent with severe obstructive ventilatory impairment.  There is moderate airway trapping present.  Diffusion capacity is severely reduced which is likely secondary to emphysema or other concomitant etiology as mentioned above.  Clinical correlation is recommended

## 2024-01-16 NOTE — PROGRESS NOTES
OUTPATIENT PULMONARY PROGRESS NOTE      Patient:  Shantell Walsh  MRN: [id number]    Consulting Physician: Dr Villanueva  Reason for Consult: Dyspnea, COPD  Primacy Care Physician: David Joyce MD    HISTORY OF PRESENT ILLNESS:   The patient is a 68 y.o. male   He is here for follow-up today, he has history of severe  COPD/Emphysema and history of lung nodules/scarring which was stable for 2 years on CT scan in the past.    According to patient since Christmas he was having dry cough sometime he is a sputum production other family members are sick with cough and they have bronchodilators.  He is also complaining of wheezing sometimes he has cough at night, sometimes he has coughing spells.  He feels more cough when he lays at night.  Usually he is more short of breath in winter as compared to summer so he is slightly more shortness of breath than usual.  He is active he is able to do his regular activities someday get shortness of breath with walking small distances and some days he can do better.  He does not complain of fever chills does not complain of yellowish sputum denies hemoptysis and does not complain of chest pain.  He denies weight loss he might have gained some weight over the holidays.  He thinks when he walks with a normal pace he is able to do that but he has to go upstairs uphill task or walk fast she gets short of breath.    He had a CT scan of the chest done which was low-dose CT scan for follow-up as he has history of waxing and waning lung nodules.  CT scan of the chest done on 12/18/2023 and as compared to 12/13/2022 he developed 5 mm lingular nodule which was not present before.  In the office today he had a 6-minute walk test done he was able to walk 750 feet and after that his saturation dropped to 88% he was placed on nasal cannula.  He does not want to use oxygen at this time.    CT scan of the chest done on 12/13/2022 and is compared to CT scan of the chest on 09/06/2022 3 months earlier

## 2024-01-18 DIAGNOSIS — J43.2 CENTRILOBULAR EMPHYSEMA (HCC): ICD-10-CM

## 2024-02-28 ENCOUNTER — HOSPITAL ENCOUNTER (OUTPATIENT)
Age: 69
Discharge: HOME | End: 2024-02-28
Payer: MEDICARE

## 2024-02-28 PROCEDURE — 93005 ELECTROCARDIOGRAM TRACING: CPT | Performed by: ANESTHESIOLOGY

## 2024-02-29 LAB
EKG ATRIAL RATE: 47 BPM
EKG P AXIS: 65 DEGREES
EKG P-R INTERVAL: 218 MS
EKG Q-T INTERVAL: 462 MS
EKG QRS DURATION: 76 MS
EKG QTC CALCULATION (BAZETT): 408 MS
EKG R AXIS: 59 DEGREES
EKG T AXIS: -6 DEGREES
EKG VENTRICULAR RATE: 47 BPM

## 2024-03-04 ENCOUNTER — ANESTHESIA EVENT (OUTPATIENT)
Dept: OPERATING ROOM | Age: 69
End: 2024-03-04
Payer: MEDICARE

## 2024-03-05 ENCOUNTER — HOSPITAL ENCOUNTER (OUTPATIENT)
Age: 69
Setting detail: OUTPATIENT SURGERY
Discharge: HOME | End: 2024-03-05
Attending: PLASTIC SURGERY | Admitting: PLASTIC SURGERY
Payer: MEDICARE

## 2024-03-05 ENCOUNTER — ANESTHESIA (OUTPATIENT)
Dept: OPERATING ROOM | Age: 69
End: 2024-03-05
Payer: MEDICARE

## 2024-03-05 VITALS
SYSTOLIC BLOOD PRESSURE: 97 MMHG | HEART RATE: 55 BPM | HEIGHT: 70 IN | TEMPERATURE: 97.6 F | WEIGHT: 189 LBS | RESPIRATION RATE: 14 BRPM | BODY MASS INDEX: 27.06 KG/M2 | DIASTOLIC BLOOD PRESSURE: 68 MMHG | OXYGEN SATURATION: 93 %

## 2024-03-05 DIAGNOSIS — C44.99 CANCER OF SEBACEOUS GLANDS: ICD-10-CM

## 2024-03-05 DIAGNOSIS — Z01.818 PRE-OP TESTING: Primary | ICD-10-CM

## 2024-03-05 LAB — GLUCOSE BLD-MCNC: 144 MG/DL (ref 75–110)

## 2024-03-05 PROCEDURE — 7100000001 HC PACU RECOVERY - ADDTL 15 MIN: Performed by: PLASTIC SURGERY

## 2024-03-05 PROCEDURE — 2500000003 HC RX 250 WO HCPCS: Performed by: PLASTIC SURGERY

## 2024-03-05 PROCEDURE — 3600000002 HC SURGERY LEVEL 2 BASE: Performed by: PLASTIC SURGERY

## 2024-03-05 PROCEDURE — 88332 PATH CONSLTJ SURG EA ADD BLK: CPT

## 2024-03-05 PROCEDURE — 7100000011 HC PHASE II RECOVERY - ADDTL 15 MIN: Performed by: PLASTIC SURGERY

## 2024-03-05 PROCEDURE — 6360000002 HC RX W HCPCS: Performed by: SPECIALIST

## 2024-03-05 PROCEDURE — 3700000000 HC ANESTHESIA ATTENDED CARE: Performed by: PLASTIC SURGERY

## 2024-03-05 PROCEDURE — 3700000001 HC ADD 15 MINUTES (ANESTHESIA): Performed by: PLASTIC SURGERY

## 2024-03-05 PROCEDURE — 2580000003 HC RX 258: Performed by: ANESTHESIOLOGY

## 2024-03-05 PROCEDURE — 2500000003 HC RX 250 WO HCPCS: Performed by: SPECIALIST

## 2024-03-05 PROCEDURE — 82947 ASSAY GLUCOSE BLOOD QUANT: CPT

## 2024-03-05 PROCEDURE — 2709999900 HC NON-CHARGEABLE SUPPLY: Performed by: PLASTIC SURGERY

## 2024-03-05 PROCEDURE — 7100000000 HC PACU RECOVERY - FIRST 15 MIN: Performed by: PLASTIC SURGERY

## 2024-03-05 PROCEDURE — 7100000010 HC PHASE II RECOVERY - FIRST 15 MIN: Performed by: PLASTIC SURGERY

## 2024-03-05 PROCEDURE — 88331 PATH CONSLTJ SURG 1 BLK 1SPC: CPT

## 2024-03-05 PROCEDURE — 88305 TISSUE EXAM BY PATHOLOGIST: CPT

## 2024-03-05 PROCEDURE — 3600000012 HC SURGERY LEVEL 2 ADDTL 15MIN: Performed by: PLASTIC SURGERY

## 2024-03-05 RX ORDER — ONDANSETRON 2 MG/ML
INJECTION INTRAMUSCULAR; INTRAVENOUS PRN
Status: DISCONTINUED | OUTPATIENT
Start: 2024-03-05 | End: 2024-03-05 | Stop reason: SDUPTHER

## 2024-03-05 RX ORDER — CEPHALEXIN 500 MG/1
500 CAPSULE ORAL 3 TIMES DAILY
Qty: 15 CAPSULE | Refills: 0 | Status: SHIPPED | OUTPATIENT
Start: 2024-03-05 | End: 2024-03-10

## 2024-03-05 RX ORDER — SODIUM CHLORIDE 0.9 % (FLUSH) 0.9 %
5-40 SYRINGE (ML) INJECTION PRN
Status: DISCONTINUED | OUTPATIENT
Start: 2024-03-05 | End: 2024-03-05 | Stop reason: HOSPADM

## 2024-03-05 RX ORDER — MEPERIDINE HYDROCHLORIDE 50 MG/ML
12.5 INJECTION INTRAMUSCULAR; INTRAVENOUS; SUBCUTANEOUS EVERY 5 MIN PRN
Status: DISCONTINUED | OUTPATIENT
Start: 2024-03-05 | End: 2024-03-05 | Stop reason: HOSPADM

## 2024-03-05 RX ORDER — OXYCODONE HYDROCHLORIDE AND ACETAMINOPHEN 5; 325 MG/1; MG/1
1 TABLET ORAL EVERY 6 HOURS PRN
Qty: 28 TABLET | Refills: 0 | Status: SHIPPED | OUTPATIENT
Start: 2024-03-05 | End: 2024-03-12

## 2024-03-05 RX ORDER — SODIUM CHLORIDE 0.9 % (FLUSH) 0.9 %
5-40 SYRINGE (ML) INJECTION EVERY 12 HOURS SCHEDULED
Status: DISCONTINUED | OUTPATIENT
Start: 2024-03-05 | End: 2024-03-05 | Stop reason: HOSPADM

## 2024-03-05 RX ORDER — ONDANSETRON 2 MG/ML
4 INJECTION INTRAMUSCULAR; INTRAVENOUS
Status: DISCONTINUED | OUTPATIENT
Start: 2024-03-05 | End: 2024-03-05 | Stop reason: HOSPADM

## 2024-03-05 RX ORDER — CEFAZOLIN SODIUM 1 G/3ML
INJECTION, POWDER, FOR SOLUTION INTRAMUSCULAR; INTRAVENOUS PRN
Status: DISCONTINUED | OUTPATIENT
Start: 2024-03-05 | End: 2024-03-05 | Stop reason: SDUPTHER

## 2024-03-05 RX ORDER — GLYCOPYRROLATE 1 MG/5 ML
SYRINGE (ML) INTRAVENOUS PRN
Status: DISCONTINUED | OUTPATIENT
Start: 2024-03-05 | End: 2024-03-05 | Stop reason: SDUPTHER

## 2024-03-05 RX ORDER — PROPOFOL 10 MG/ML
INJECTION, EMULSION INTRAVENOUS PRN
Status: DISCONTINUED | OUTPATIENT
Start: 2024-03-05 | End: 2024-03-05 | Stop reason: SDUPTHER

## 2024-03-05 RX ORDER — MORPHINE SULFATE 2 MG/ML
2 INJECTION, SOLUTION INTRAMUSCULAR; INTRAVENOUS EVERY 5 MIN PRN
Status: DISCONTINUED | OUTPATIENT
Start: 2024-03-05 | End: 2024-03-05 | Stop reason: HOSPADM

## 2024-03-05 RX ORDER — MIDAZOLAM HYDROCHLORIDE 2 MG/2ML
2 INJECTION, SOLUTION INTRAMUSCULAR; INTRAVENOUS
Status: DISCONTINUED | OUTPATIENT
Start: 2024-03-05 | End: 2024-03-05 | Stop reason: HOSPADM

## 2024-03-05 RX ORDER — FENTANYL CITRATE 50 UG/ML
INJECTION, SOLUTION INTRAMUSCULAR; INTRAVENOUS PRN
Status: DISCONTINUED | OUTPATIENT
Start: 2024-03-05 | End: 2024-03-05 | Stop reason: SDUPTHER

## 2024-03-05 RX ORDER — CEFAZOLIN 2 G/1
INJECTION, POWDER, FOR SOLUTION INTRAMUSCULAR; INTRAVENOUS
Status: COMPLETED
Start: 2024-03-05 | End: 2024-03-05

## 2024-03-05 RX ORDER — GLYCOPYRROLATE 0.2 MG/ML
0.4 INJECTION INTRAMUSCULAR; INTRAVENOUS ONCE
Status: DISCONTINUED | OUTPATIENT
Start: 2024-03-05 | End: 2024-03-05 | Stop reason: HOSPADM

## 2024-03-05 RX ORDER — HYDRALAZINE HYDROCHLORIDE 20 MG/ML
10 INJECTION INTRAMUSCULAR; INTRAVENOUS
Status: DISCONTINUED | OUTPATIENT
Start: 2024-03-05 | End: 2024-03-05 | Stop reason: HOSPADM

## 2024-03-05 RX ORDER — LABETALOL HYDROCHLORIDE 5 MG/ML
10 INJECTION, SOLUTION INTRAVENOUS
Status: DISCONTINUED | OUTPATIENT
Start: 2024-03-05 | End: 2024-03-05 | Stop reason: HOSPADM

## 2024-03-05 RX ORDER — MIDAZOLAM HYDROCHLORIDE 1 MG/ML
INJECTION INTRAMUSCULAR; INTRAVENOUS PRN
Status: DISCONTINUED | OUTPATIENT
Start: 2024-03-05 | End: 2024-03-05 | Stop reason: SDUPTHER

## 2024-03-05 RX ORDER — PHENYLEPHRINE HCL IN 0.9% NACL 1 MG/10 ML
SYRINGE (ML) INTRAVENOUS PRN
Status: DISCONTINUED | OUTPATIENT
Start: 2024-03-05 | End: 2024-03-05 | Stop reason: SDUPTHER

## 2024-03-05 RX ORDER — BUPIVACAINE HYDROCHLORIDE AND EPINEPHRINE 5; 5 MG/ML; UG/ML
INJECTION, SOLUTION EPIDURAL; INTRACAUDAL; PERINEURAL
Status: DISCONTINUED
Start: 2024-03-05 | End: 2024-03-05 | Stop reason: HOSPADM

## 2024-03-05 RX ORDER — DEXAMETHASONE SODIUM PHOSPHATE 10 MG/ML
INJECTION INTRAMUSCULAR; INTRAVENOUS PRN
Status: DISCONTINUED | OUTPATIENT
Start: 2024-03-05 | End: 2024-03-05 | Stop reason: SDUPTHER

## 2024-03-05 RX ORDER — SODIUM CHLORIDE, SODIUM LACTATE, POTASSIUM CHLORIDE, CALCIUM CHLORIDE 600; 310; 30; 20 MG/100ML; MG/100ML; MG/100ML; MG/100ML
INJECTION, SOLUTION INTRAVENOUS CONTINUOUS
Status: DISCONTINUED | OUTPATIENT
Start: 2024-03-05 | End: 2024-03-05 | Stop reason: HOSPADM

## 2024-03-05 RX ORDER — PROMETHAZINE HYDROCHLORIDE 25 MG/ML
6.25 INJECTION, SOLUTION INTRAMUSCULAR; INTRAVENOUS EVERY 5 MIN PRN
Status: DISCONTINUED | OUTPATIENT
Start: 2024-03-05 | End: 2024-03-05 | Stop reason: HOSPADM

## 2024-03-05 RX ORDER — SODIUM CHLORIDE 9 MG/ML
INJECTION, SOLUTION INTRAVENOUS CONTINUOUS
Status: DISCONTINUED | OUTPATIENT
Start: 2024-03-05 | End: 2024-03-05 | Stop reason: HOSPADM

## 2024-03-05 RX ORDER — OXYCODONE HYDROCHLORIDE AND ACETAMINOPHEN 5; 325 MG/1; MG/1
2 TABLET ORAL
Status: DISCONTINUED | OUTPATIENT
Start: 2024-03-05 | End: 2024-03-05 | Stop reason: HOSPADM

## 2024-03-05 RX ORDER — DIPHENHYDRAMINE HYDROCHLORIDE 50 MG/ML
12.5 INJECTION INTRAMUSCULAR; INTRAVENOUS
Status: DISCONTINUED | OUTPATIENT
Start: 2024-03-05 | End: 2024-03-05 | Stop reason: HOSPADM

## 2024-03-05 RX ORDER — METOCLOPRAMIDE HYDROCHLORIDE 5 MG/ML
10 INJECTION INTRAMUSCULAR; INTRAVENOUS
Status: DISCONTINUED | OUTPATIENT
Start: 2024-03-05 | End: 2024-03-05 | Stop reason: HOSPADM

## 2024-03-05 RX ORDER — SODIUM CHLORIDE 9 MG/ML
INJECTION, SOLUTION INTRAVENOUS PRN
Status: DISCONTINUED | OUTPATIENT
Start: 2024-03-05 | End: 2024-03-05 | Stop reason: HOSPADM

## 2024-03-05 RX ORDER — OXYCODONE HYDROCHLORIDE AND ACETAMINOPHEN 5; 325 MG/1; MG/1
1 TABLET ORAL
Status: DISCONTINUED | OUTPATIENT
Start: 2024-03-05 | End: 2024-03-05 | Stop reason: HOSPADM

## 2024-03-05 RX ORDER — LIDOCAINE HYDROCHLORIDE 10 MG/ML
INJECTION, SOLUTION INFILTRATION; PERINEURAL PRN
Status: DISCONTINUED | OUTPATIENT
Start: 2024-03-05 | End: 2024-03-05 | Stop reason: SDUPTHER

## 2024-03-05 RX ORDER — IPRATROPIUM BROMIDE AND ALBUTEROL SULFATE 2.5; .5 MG/3ML; MG/3ML
1 SOLUTION RESPIRATORY (INHALATION)
Status: DISCONTINUED | OUTPATIENT
Start: 2024-03-05 | End: 2024-03-05 | Stop reason: HOSPADM

## 2024-03-05 RX ORDER — BUPIVACAINE HYDROCHLORIDE AND EPINEPHRINE 5; 5 MG/ML; UG/ML
INJECTION, SOLUTION PERINEURAL PRN
Status: DISCONTINUED | OUTPATIENT
Start: 2024-03-05 | End: 2024-03-05 | Stop reason: ALTCHOICE

## 2024-03-05 RX ORDER — KETOROLAC TROMETHAMINE 30 MG/ML
INJECTION, SOLUTION INTRAMUSCULAR; INTRAVENOUS PRN
Status: DISCONTINUED | OUTPATIENT
Start: 2024-03-05 | End: 2024-03-05 | Stop reason: SDUPTHER

## 2024-03-05 RX ADMIN — SODIUM CHLORIDE: 9 INJECTION, SOLUTION INTRAVENOUS at 10:43

## 2024-03-05 RX ADMIN — MIDAZOLAM 2 MG: 1 INJECTION INTRAMUSCULAR; INTRAVENOUS at 09:38

## 2024-03-05 RX ADMIN — FENTANYL CITRATE 25 MCG: 50 INJECTION, SOLUTION INTRAMUSCULAR; INTRAVENOUS at 10:15

## 2024-03-05 RX ADMIN — FENTANYL CITRATE 25 MCG: 50 INJECTION, SOLUTION INTRAMUSCULAR; INTRAVENOUS at 09:58

## 2024-03-05 RX ADMIN — Medication 100 MCG: at 10:07

## 2024-03-05 RX ADMIN — PROPOFOL 200 MG: 10 INJECTION, EMULSION INTRAVENOUS at 09:42

## 2024-03-05 RX ADMIN — Medication 100 MCG: at 10:35

## 2024-03-05 RX ADMIN — CEFAZOLIN 2 G: 2 INJECTION, POWDER, FOR SOLUTION INTRAMUSCULAR; INTRAVENOUS at 09:50

## 2024-03-05 RX ADMIN — Medication 100 MCG: at 11:05

## 2024-03-05 RX ADMIN — ONDANSETRON 4 MG: 2 INJECTION INTRAMUSCULAR; INTRAVENOUS at 10:42

## 2024-03-05 RX ADMIN — KETOROLAC TROMETHAMINE 15 MG: 30 INJECTION, SOLUTION INTRAMUSCULAR; INTRAVENOUS at 10:42

## 2024-03-05 RX ADMIN — SODIUM CHLORIDE: 9 INJECTION, SOLUTION INTRAVENOUS at 09:35

## 2024-03-05 RX ADMIN — LIDOCAINE HYDROCHLORIDE 40 MG: 10 INJECTION, SOLUTION INFILTRATION; PERINEURAL at 09:42

## 2024-03-05 RX ADMIN — Medication 100 MCG: at 10:57

## 2024-03-05 RX ADMIN — Medication 0.2 MG: at 10:05

## 2024-03-05 RX ADMIN — Medication 100 MCG: at 10:23

## 2024-03-05 RX ADMIN — FENTANYL CITRATE 50 MCG: 50 INJECTION, SOLUTION INTRAMUSCULAR; INTRAVENOUS at 09:42

## 2024-03-05 RX ADMIN — Medication 100 MCG: at 10:47

## 2024-03-05 RX ADMIN — DEXAMETHASONE SODIUM PHOSPHATE 5 MG: 10 INJECTION INTRAMUSCULAR; INTRAVENOUS at 09:42

## 2024-03-05 RX ADMIN — Medication 100 MCG: at 10:51

## 2024-03-05 ASSESSMENT — PAIN - FUNCTIONAL ASSESSMENT: PAIN_FUNCTIONAL_ASSESSMENT: NONE - DENIES PAIN

## 2024-03-05 NOTE — H&P
pain, n/v/d/c. Denies rashes, any upper respiratory illness or fever / chills. Denies dizziness, headaches, tremor.  Negative other than those noted above.          Allergies   Allergen Reactions    Pcn [Penicillins]           Physical Exam:  VITALS:  height is 1.753 m (5' 9\") and weight is 84.4 kg (186 lb).   CONSTITUTIONAL:alert & orientated x 3, no acute distress   SKIN:  Warm and dry, no rashes.         See assessment below  HEAD:  Normocephalic, atraumatic  EYES: PERRL.  EOMs intact.  EARS:  Hearing grossly WNL.   NOSE:  Nares patent.  No rhinorrhea  THROAT:  benign  NECK:supple, no lymphadenopathy  LUNGS: No audible adventitious lung sounds. Patient is breathing normally without issues speaking in full sentences.  CARDIOVASCULAR: Heart sounds are normal. Pulses equal bilaterally. Skin color appropriate throughout.  Regular rate and rhythm without murmur, gallop or rub.  ABDOMEN: soft, non tender, non distended, no masses or organomegaly   EXTREMITIES: no edema bilateral lower extremities         Assessment:  1.     Diagnosis Orders   1. Sebaceous carcinoma                No orders of the defined types were placed in this encounter.        Plan:  At this time We discussed his pathology as well as his family history at length and he would like to move forward with excision.  Self skin checks encouraged, patient advised to watch all existing lesions for changes and observe for any new lesions. Return to the office for evaluation of any concerning, changing or new skin lesions.  3.  Consistent use of sunscreen containing SPF 30 or higher and reapplication every 90 - 120 minutes while outside.  4.  The patient's was evaluated and after discussion surgical and non surgical options, the patient has decided to undergo surgical removal of the lesion. All questions were answered to the patient's satisfaction. We will get him scheduled for excision under General anesthesia. The patient will call Monika to schedule the

## 2024-03-05 NOTE — OP NOTE
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]    Post-Op Diagnosis: Same       Procedure(s):  LEFT NASAL ALA SEBACEOUS LESION BIOPSY EXCISION WITH NASOLABIAL FLAP CLOSURE  AND FROZEN SECTION * PATHOLOGY REQUIRED    Surgeon(s):  Hector Steele MD    Assistant:   * No surgical staff found *    Anesthesia: General    Estimated Blood Loss (mL): less than 50     Complications: None    Specimens:   ID Type Source Tests Collected by Time Destination   A : LEFT NASAL LESION (SUTURE TAG: SHORT-SUPERIOR, LONG-LATERAL) Tissue Skin SURGICAL PATHOLOGY Hector Steele MD 3/5/2024 0954        Implants:  * No implants in log *      Drains: * No LDAs found *    Findings: 11 x 6 mm defect after excision of the sebaceous like cancer that was diagnosed on shave biopsy by the dermatologist.  The defect measured approximately 75 mm².  Frozen section proved to have sebaceous material but they could not identify any carcinoma at the edges be its peripherally or deep.  Attempts at primary closure caused total collapse of the nasal alar edge.  This was unacceptable.  A left-sided nasolabial flap that measured 15 mm wide by 4 cm long was used.  This was elevated and trimmed for the flap size of approximately 90 mm².    This procedure was not performed to treat primary cutaneous melanoma through wide local excision      Detailed Description of Procedure:   Patient fine general anesthesia induced via posterior pharyngeal LMA intubation the face prepped draped in a sterile fashion the area surrounding the nasal tumor as marked was injected with half percent Marcaine 1-200,000 epinephrine local.  After sterile prep and drape and appropriate timeout this was planned for excision with 2 mm clinically free margins.  The lesion itself measured approximately 9 mm.  The pathology was sent and

## 2024-03-05 NOTE — ANESTHESIA POSTPROCEDURE EVALUATION
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    Salma Phase I: Salma Score: 7    Salma Phase II:      Anesthesia Post Evaluation    Patient location during evaluation: PACU  Patient participation: complete - patient participated  Level of consciousness: awake and alert  Airway patency: patent  Nausea & Vomiting: no nausea and no vomiting  Cardiovascular status: hemodynamically stable  Respiratory status: room air and spontaneous ventilation  Hydration status: euvolemic  Multimodal analgesia pain management approach  Pain management: adequate    No notable events documented.

## 2024-03-07 LAB — SURGICAL PATHOLOGY REPORT: NORMAL

## 2024-04-03 ENCOUNTER — APPOINTMENT (OUTPATIENT)
Dept: GENERAL RADIOLOGY | Age: 69
DRG: 871 | End: 2024-04-03
Payer: MEDICARE

## 2024-04-03 ENCOUNTER — HOSPITAL ENCOUNTER (INPATIENT)
Age: 69
LOS: 4 days | Discharge: HOME | DRG: 871 | End: 2024-04-07
Attending: EMERGENCY MEDICINE | Admitting: STUDENT IN AN ORGANIZED HEALTH CARE EDUCATION/TRAINING PROGRAM
Payer: MEDICARE

## 2024-04-03 DIAGNOSIS — J18.9 MULTIFOCAL PNEUMONIA: Primary | ICD-10-CM

## 2024-04-03 PROBLEM — E11.9 TYPE 2 DIABETES MELLITUS, WITHOUT LONG-TERM CURRENT USE OF INSULIN (HCC): Status: ACTIVE | Noted: 2024-04-03

## 2024-04-03 PROBLEM — J44.9 COPD (CHRONIC OBSTRUCTIVE PULMONARY DISEASE) (HCC): Status: ACTIVE | Noted: 2017-02-15

## 2024-04-03 LAB
ALBUMIN SERPL-MCNC: 4.4 G/DL (ref 3.5–5.2)
ALBUMIN/GLOB SERPL: 1.3 {RATIO} (ref 1–2.5)
ALP SERPL-CCNC: 91 U/L (ref 40–129)
ALT SERPL-CCNC: 20 U/L (ref 5–41)
ANION GAP SERPL CALCULATED.3IONS-SCNC: 19 MMOL/L (ref 9–17)
AST SERPL-CCNC: 10 U/L
BASOPHILS # BLD: 0.1 K/UL (ref 0–0.2)
BASOPHILS NFR BLD: 0 % (ref 0–2)
BILIRUB SERPL-MCNC: 0.8 MG/DL (ref 0.3–1.2)
BNP SERPL-MCNC: 1150 PG/ML
BUN SERPL-MCNC: 12 MG/DL (ref 8–23)
CALCIUM SERPL-MCNC: 9.3 MG/DL (ref 8.6–10.4)
CHLORIDE SERPL-SCNC: 89 MMOL/L (ref 98–107)
CO2 SERPL-SCNC: 22 MMOL/L (ref 20–31)
CREAT SERPL-MCNC: 1 MG/DL (ref 0.7–1.2)
EKG ATRIAL RATE: 93 BPM
EKG P AXIS: 76 DEGREES
EKG P-R INTERVAL: 168 MS
EKG Q-T INTERVAL: 348 MS
EKG QRS DURATION: 84 MS
EKG QTC CALCULATION (BAZETT): 432 MS
EKG R AXIS: 67 DEGREES
EKG T AXIS: 83 DEGREES
EKG VENTRICULAR RATE: 93 BPM
EOSINOPHIL # BLD: 0 K/UL (ref 0–0.4)
EOSINOPHILS RELATIVE PERCENT: 0 % (ref 1–4)
ERYTHROCYTE [DISTWIDTH] IN BLOOD BY AUTOMATED COUNT: 14.4 % (ref 12.5–15.4)
FLUAV AG SPEC QL: NEGATIVE
FLUBV AG SPEC QL: NEGATIVE
GFR SERPL CREATININE-BSD FRML MDRD: 82 ML/MIN/1.73M2
GLUCOSE BLD-MCNC: 244 MG/DL (ref 75–110)
GLUCOSE BLD-MCNC: 254 MG/DL (ref 75–110)
GLUCOSE SERPL-MCNC: 206 MG/DL (ref 70–99)
HCT VFR BLD AUTO: 38.4 % (ref 41–53)
HGB BLD-MCNC: 13 G/DL (ref 13.5–17.5)
LACTATE BLDV-SCNC: 4 MMOL/L (ref 0.5–2.2)
LACTATE BLDV-SCNC: 4.4 MMOL/L (ref 0.5–2.2)
LYMPHOCYTES NFR BLD: 1.2 K/UL (ref 1–4.8)
LYMPHOCYTES RELATIVE PERCENT: 5 % (ref 24–44)
MAGNESIUM SERPL-MCNC: 1.5 MG/DL (ref 1.6–2.6)
MCH RBC QN AUTO: 27.7 PG (ref 26–34)
MCHC RBC AUTO-ENTMCNC: 33.9 G/DL (ref 31–37)
MCV RBC AUTO: 81.7 FL (ref 80–100)
MONOCYTES NFR BLD: 0.8 K/UL (ref 0.1–1.2)
MONOCYTES NFR BLD: 4 % (ref 2–11)
NEUTROPHILS NFR BLD: 91 % (ref 36–66)
NEUTS SEG NFR BLD: 19.3 K/UL (ref 1.8–7.7)
PLATELET # BLD AUTO: 329 K/UL (ref 140–450)
PMV BLD AUTO: 7.3 FL (ref 6–12)
POTASSIUM SERPL-SCNC: 3.4 MMOL/L (ref 3.7–5.3)
PROT SERPL-MCNC: 7.9 G/DL (ref 6.4–8.3)
RBC # BLD AUTO: 4.7 M/UL (ref 4.5–5.9)
SARS-COV-2 RDRP RESP QL NAA+PROBE: NOT DETECTED
SODIUM SERPL-SCNC: 130 MMOL/L (ref 135–144)
SPECIMEN DESCRIPTION: NORMAL
TROPONIN I SERPL HS-MCNC: 12 NG/L (ref 0–22)
WBC OTHER # BLD: 21.3 K/UL (ref 3.5–11)

## 2024-04-03 PROCEDURE — 6370000000 HC RX 637 (ALT 250 FOR IP): Performed by: STUDENT IN AN ORGANIZED HEALTH CARE EDUCATION/TRAINING PROGRAM

## 2024-04-03 PROCEDURE — 80053 COMPREHEN METABOLIC PANEL: CPT

## 2024-04-03 PROCEDURE — 2700000000 HC OXYGEN THERAPY PER DAY

## 2024-04-03 PROCEDURE — 2580000003 HC RX 258: Performed by: STUDENT IN AN ORGANIZED HEALTH CARE EDUCATION/TRAINING PROGRAM

## 2024-04-03 PROCEDURE — 83605 ASSAY OF LACTIC ACID: CPT

## 2024-04-03 PROCEDURE — 82947 ASSAY GLUCOSE BLOOD QUANT: CPT

## 2024-04-03 PROCEDURE — 6370000000 HC RX 637 (ALT 250 FOR IP)

## 2024-04-03 PROCEDURE — 94760 N-INVAS EAR/PLS OXIMETRY 1: CPT

## 2024-04-03 PROCEDURE — 2580000003 HC RX 258: Performed by: EMERGENCY MEDICINE

## 2024-04-03 PROCEDURE — 87205 SMEAR GRAM STAIN: CPT

## 2024-04-03 PROCEDURE — 93005 ELECTROCARDIOGRAM TRACING: CPT | Performed by: EMERGENCY MEDICINE

## 2024-04-03 PROCEDURE — 99285 EMERGENCY DEPT VISIT HI MDM: CPT

## 2024-04-03 PROCEDURE — 2580000003 HC RX 258

## 2024-04-03 PROCEDURE — 94640 AIRWAY INHALATION TREATMENT: CPT

## 2024-04-03 PROCEDURE — 36415 COLL VENOUS BLD VENIPUNCTURE: CPT

## 2024-04-03 PROCEDURE — 6360000002 HC RX W HCPCS: Performed by: STUDENT IN AN ORGANIZED HEALTH CARE EDUCATION/TRAINING PROGRAM

## 2024-04-03 PROCEDURE — 6360000002 HC RX W HCPCS: Performed by: EMERGENCY MEDICINE

## 2024-04-03 PROCEDURE — 87804 INFLUENZA ASSAY W/OPTIC: CPT

## 2024-04-03 PROCEDURE — 87040 BLOOD CULTURE FOR BACTERIA: CPT

## 2024-04-03 PROCEDURE — 6360000002 HC RX W HCPCS

## 2024-04-03 PROCEDURE — 83880 ASSAY OF NATRIURETIC PEPTIDE: CPT

## 2024-04-03 PROCEDURE — 1200000000 HC SEMI PRIVATE

## 2024-04-03 PROCEDURE — 96374 THER/PROPH/DIAG INJ IV PUSH: CPT

## 2024-04-03 PROCEDURE — 71045 X-RAY EXAM CHEST 1 VIEW: CPT

## 2024-04-03 PROCEDURE — 84484 ASSAY OF TROPONIN QUANT: CPT

## 2024-04-03 PROCEDURE — 87186 SC STD MICRODIL/AGAR DIL: CPT

## 2024-04-03 PROCEDURE — APPSS45 APP SPLIT SHARED TIME 31-45 MINUTES

## 2024-04-03 PROCEDURE — 6370000000 HC RX 637 (ALT 250 FOR IP): Performed by: EMERGENCY MEDICINE

## 2024-04-03 PROCEDURE — 99222 1ST HOSP IP/OBS MODERATE 55: CPT | Performed by: STUDENT IN AN ORGANIZED HEALTH CARE EDUCATION/TRAINING PROGRAM

## 2024-04-03 PROCEDURE — 87154 CUL TYP ID BLD PTHGN 6+ TRGT: CPT

## 2024-04-03 PROCEDURE — 85025 COMPLETE CBC W/AUTO DIFF WBC: CPT

## 2024-04-03 PROCEDURE — 87635 SARS-COV-2 COVID-19 AMP PRB: CPT

## 2024-04-03 PROCEDURE — 83735 ASSAY OF MAGNESIUM: CPT

## 2024-04-03 RX ORDER — LEVOFLOXACIN 5 MG/ML
750 INJECTION, SOLUTION INTRAVENOUS ONCE
Status: COMPLETED | OUTPATIENT
Start: 2024-04-03 | End: 2024-04-03

## 2024-04-03 RX ORDER — INSULIN LISPRO 100 [IU]/ML
0-4 INJECTION, SOLUTION INTRAVENOUS; SUBCUTANEOUS NIGHTLY
Status: DISCONTINUED | OUTPATIENT
Start: 2024-04-03 | End: 2024-04-07 | Stop reason: HOSPADM

## 2024-04-03 RX ORDER — ALBUTEROL SULFATE 2.5 MG/3ML
2.5 SOLUTION RESPIRATORY (INHALATION)
Status: DISPENSED | OUTPATIENT
Start: 2024-04-03 | End: 2024-04-03

## 2024-04-03 RX ORDER — BUDESONIDE AND FORMOTEROL FUMARATE DIHYDRATE 160; 4.5 UG/1; UG/1
2 AEROSOL RESPIRATORY (INHALATION)
Status: DISCONTINUED | OUTPATIENT
Start: 2024-04-03 | End: 2024-04-03

## 2024-04-03 RX ORDER — ONDANSETRON 2 MG/ML
4 INJECTION INTRAMUSCULAR; INTRAVENOUS EVERY 6 HOURS PRN
Status: DISCONTINUED | OUTPATIENT
Start: 2024-04-03 | End: 2024-04-07 | Stop reason: HOSPADM

## 2024-04-03 RX ORDER — ROSUVASTATIN CALCIUM 20 MG/1
40 TABLET, COATED ORAL DAILY
Status: DISCONTINUED | OUTPATIENT
Start: 2024-04-03 | End: 2024-04-07 | Stop reason: HOSPADM

## 2024-04-03 RX ORDER — ALBUTEROL SULFATE 2.5 MG/3ML
2.5 SOLUTION RESPIRATORY (INHALATION)
Status: DISCONTINUED | OUTPATIENT
Start: 2024-04-04 | End: 2024-04-05

## 2024-04-03 RX ORDER — POTASSIUM CHLORIDE 20 MEQ/1
40 TABLET, EXTENDED RELEASE ORAL PRN
Status: DISCONTINUED | OUTPATIENT
Start: 2024-04-03 | End: 2024-04-07 | Stop reason: HOSPADM

## 2024-04-03 RX ORDER — ACETAMINOPHEN 325 MG/1
650 TABLET ORAL EVERY 6 HOURS PRN
Status: DISCONTINUED | OUTPATIENT
Start: 2024-04-03 | End: 2024-04-07 | Stop reason: HOSPADM

## 2024-04-03 RX ORDER — DILTIAZEM HYDROCHLORIDE 240 MG/1
240 CAPSULE, COATED, EXTENDED RELEASE ORAL DAILY
COMMUNITY

## 2024-04-03 RX ORDER — ALBUTEROL SULFATE 2.5 MG/3ML
2.5 SOLUTION RESPIRATORY (INHALATION) EVERY 4 HOURS PRN
Status: DISCONTINUED | OUTPATIENT
Start: 2024-04-03 | End: 2024-04-07 | Stop reason: HOSPADM

## 2024-04-03 RX ORDER — PRASUGREL 10 MG/1
10 TABLET, FILM COATED ORAL DAILY
Status: DISCONTINUED | OUTPATIENT
Start: 2024-04-04 | End: 2024-04-07 | Stop reason: HOSPADM

## 2024-04-03 RX ORDER — ASPIRIN 81 MG/1
81 TABLET ORAL DAILY
Status: DISCONTINUED | OUTPATIENT
Start: 2024-04-03 | End: 2024-04-07 | Stop reason: HOSPADM

## 2024-04-03 RX ORDER — SODIUM CHLORIDE 0.9 % (FLUSH) 0.9 %
5-40 SYRINGE (ML) INJECTION EVERY 12 HOURS SCHEDULED
Status: DISCONTINUED | OUTPATIENT
Start: 2024-04-03 | End: 2024-04-07 | Stop reason: HOSPADM

## 2024-04-03 RX ORDER — DILTIAZEM HYDROCHLORIDE 120 MG/1
240 CAPSULE, COATED, EXTENDED RELEASE ORAL DAILY
Status: DISCONTINUED | OUTPATIENT
Start: 2024-04-03 | End: 2024-04-07 | Stop reason: HOSPADM

## 2024-04-03 RX ORDER — 0.9 % SODIUM CHLORIDE 0.9 %
500 INTRAVENOUS SOLUTION INTRAVENOUS ONCE
Status: COMPLETED | OUTPATIENT
Start: 2024-04-03 | End: 2024-04-03

## 2024-04-03 RX ORDER — ONDANSETRON 4 MG/1
4 TABLET, ORALLY DISINTEGRATING ORAL EVERY 8 HOURS PRN
Status: DISCONTINUED | OUTPATIENT
Start: 2024-04-03 | End: 2024-04-07 | Stop reason: HOSPADM

## 2024-04-03 RX ORDER — SODIUM CHLORIDE 0.9 % (FLUSH) 0.9 %
5-40 SYRINGE (ML) INJECTION PRN
Status: DISCONTINUED | OUTPATIENT
Start: 2024-04-03 | End: 2024-04-07 | Stop reason: HOSPADM

## 2024-04-03 RX ORDER — ACETAMINOPHEN 650 MG/1
650 SUPPOSITORY RECTAL EVERY 6 HOURS PRN
Status: DISCONTINUED | OUTPATIENT
Start: 2024-04-03 | End: 2024-04-07 | Stop reason: HOSPADM

## 2024-04-03 RX ORDER — 0.9 % SODIUM CHLORIDE 0.9 %
1000 INTRAVENOUS SOLUTION INTRAVENOUS ONCE
Status: COMPLETED | OUTPATIENT
Start: 2024-04-03 | End: 2024-04-03

## 2024-04-03 RX ORDER — POLYETHYLENE GLYCOL 3350 17 G/17G
17 POWDER, FOR SOLUTION ORAL DAILY PRN
Status: DISCONTINUED | OUTPATIENT
Start: 2024-04-03 | End: 2024-04-07 | Stop reason: HOSPADM

## 2024-04-03 RX ORDER — ALBUTEROL SULFATE 2.5 MG/3ML
2.5 SOLUTION RESPIRATORY (INHALATION) ONCE
Status: COMPLETED | OUTPATIENT
Start: 2024-04-03 | End: 2024-04-03

## 2024-04-03 RX ORDER — IPRATROPIUM BROMIDE AND ALBUTEROL SULFATE 2.5; .5 MG/3ML; MG/3ML
1 SOLUTION RESPIRATORY (INHALATION)
Status: COMPLETED | OUTPATIENT
Start: 2024-04-03 | End: 2024-04-03

## 2024-04-03 RX ORDER — ENOXAPARIN SODIUM 100 MG/ML
40 INJECTION SUBCUTANEOUS DAILY
Status: DISCONTINUED | OUTPATIENT
Start: 2024-04-03 | End: 2024-04-07 | Stop reason: HOSPADM

## 2024-04-03 RX ORDER — POTASSIUM CHLORIDE 7.45 MG/ML
10 INJECTION INTRAVENOUS PRN
Status: DISCONTINUED | OUTPATIENT
Start: 2024-04-03 | End: 2024-04-07 | Stop reason: HOSPADM

## 2024-04-03 RX ORDER — SODIUM CHLORIDE 9 MG/ML
INJECTION, SOLUTION INTRAVENOUS PRN
Status: DISCONTINUED | OUTPATIENT
Start: 2024-04-03 | End: 2024-04-07 | Stop reason: HOSPADM

## 2024-04-03 RX ORDER — LEVOFLOXACIN 5 MG/ML
750 INJECTION, SOLUTION INTRAVENOUS EVERY 24 HOURS
Status: DISCONTINUED | OUTPATIENT
Start: 2024-04-04 | End: 2024-04-04

## 2024-04-03 RX ORDER — MAGNESIUM SULFATE IN WATER 40 MG/ML
2000 INJECTION, SOLUTION INTRAVENOUS PRN
Status: DISCONTINUED | OUTPATIENT
Start: 2024-04-03 | End: 2024-04-07 | Stop reason: HOSPADM

## 2024-04-03 RX ORDER — IPRATROPIUM BROMIDE AND ALBUTEROL SULFATE 2.5; .5 MG/3ML; MG/3ML
SOLUTION RESPIRATORY (INHALATION)
Status: COMPLETED
Start: 2024-04-03 | End: 2024-04-03

## 2024-04-03 RX ORDER — INSULIN LISPRO 100 [IU]/ML
0-4 INJECTION, SOLUTION INTRAVENOUS; SUBCUTANEOUS
Status: DISCONTINUED | OUTPATIENT
Start: 2024-04-03 | End: 2024-04-07 | Stop reason: HOSPADM

## 2024-04-03 RX ORDER — ALBUTEROL SULFATE 2.5 MG/3ML
5 SOLUTION RESPIRATORY (INHALATION) ONCE
Status: COMPLETED | OUTPATIENT
Start: 2024-04-03 | End: 2024-04-03

## 2024-04-03 RX ADMIN — INSULIN LISPRO 2 UNITS: 100 INJECTION, SOLUTION INTRAVENOUS; SUBCUTANEOUS at 18:24

## 2024-04-03 RX ADMIN — SODIUM CHLORIDE, PRESERVATIVE FREE 10 ML: 5 INJECTION INTRAVENOUS at 21:54

## 2024-04-03 RX ADMIN — METHYLPREDNISOLONE SODIUM SUCCINATE 40 MG: 40 INJECTION, POWDER, FOR SOLUTION INTRAMUSCULAR; INTRAVENOUS at 18:22

## 2024-04-03 RX ADMIN — ASPIRIN 81 MG: 81 TABLET, COATED ORAL at 10:58

## 2024-04-03 RX ADMIN — IPRATROPIUM BROMIDE AND ALBUTEROL SULFATE 3 ML: 2.5; .5 SOLUTION RESPIRATORY (INHALATION) at 20:05

## 2024-04-03 RX ADMIN — IPRATROPIUM BROMIDE AND ALBUTEROL SULFATE 1 DOSE: 2.5; .5 SOLUTION RESPIRATORY (INHALATION) at 07:35

## 2024-04-03 RX ADMIN — SODIUM CHLORIDE 1000 ML: 9 INJECTION, SOLUTION INTRAVENOUS at 09:08

## 2024-04-03 RX ADMIN — MAGNESIUM SULFATE HEPTAHYDRATE 2000 MG: 40 INJECTION, SOLUTION INTRAVENOUS at 13:12

## 2024-04-03 RX ADMIN — ROSUVASTATIN CALCIUM 40 MG: 20 TABLET, FILM COATED ORAL at 10:58

## 2024-04-03 RX ADMIN — METOPROLOL TARTRATE 25 MG: 25 TABLET, FILM COATED ORAL at 10:58

## 2024-04-03 RX ADMIN — ALBUTEROL SULFATE 2.5 MG: 2.5 SOLUTION RESPIRATORY (INHALATION) at 07:39

## 2024-04-03 RX ADMIN — POTASSIUM BICARBONATE 40 MEQ: 782 TABLET, EFFERVESCENT ORAL at 13:11

## 2024-04-03 RX ADMIN — METHYLPREDNISOLONE SODIUM SUCCINATE 125 MG: 125 INJECTION, POWDER, FOR SOLUTION INTRAMUSCULAR; INTRAVENOUS at 07:44

## 2024-04-03 RX ADMIN — ALBUTEROL SULFATE 5 MG: 2.5 SOLUTION RESPIRATORY (INHALATION) at 08:34

## 2024-04-03 RX ADMIN — LEVOFLOXACIN 750 MG: 750 INJECTION, SOLUTION INTRAVENOUS at 09:12

## 2024-04-03 RX ADMIN — DILTIAZEM HYDROCHLORIDE 240 MG: 120 CAPSULE, COATED, EXTENDED RELEASE ORAL at 10:58

## 2024-04-03 RX ADMIN — METOPROLOL TARTRATE 25 MG: 25 TABLET, FILM COATED ORAL at 21:58

## 2024-04-03 RX ADMIN — SODIUM CHLORIDE 500 ML: 9 INJECTION, SOLUTION INTRAVENOUS at 15:56

## 2024-04-03 RX ADMIN — INSULIN LISPRO 1 UNITS: 100 INJECTION, SOLUTION INTRAVENOUS; SUBCUTANEOUS at 21:52

## 2024-04-03 RX ADMIN — ENOXAPARIN SODIUM 40 MG: 100 INJECTION SUBCUTANEOUS at 10:59

## 2024-04-03 ASSESSMENT — PAIN - FUNCTIONAL ASSESSMENT: PAIN_FUNCTIONAL_ASSESSMENT: NONE - DENIES PAIN

## 2024-04-03 NOTE — ED PROVIDER NOTES
[facility] Emergency Department  [address]  [facility] [address]  Phone: [phone]  Fax: [phone]      Pt Name: Shantell Walsh  MRN:[id number]  Birthdate 1955  Date of evaluation: 4/3/2024      CHIEF COMPLAINT       Chief Complaint   Patient presents with    Shortness of Breath     Started yesterday        HISTORY OF PRESENT ILLNESS    69-year-old male who has a history of COPD presents to the emergency department today complaining of a 3-day history of worsening shortness of breath.  He tells me that about a week ago he was down with \"flulike symptoms.\"  He reports that he has had increased fatigue but has not noticed a fever nasal congestion or cough.  Over this past weekend he was feeling better but again over the past 3 days he has not felt worse.  He continues to deny any cough but does report that he has had increasing worsening shortness of breath.  No known sick contacts.  Exertion makes it feel worse.  Nothing makes it feel better.  He typically is not on oxygen.     REVIEW OF SYSTEMS     Review of Systems   All other systems reviewed and are negative.        PAST MEDICAL HISTORY    has a past medical history of CAD (coronary artery disease), Centrilobular emphysema (HCC), COPD (chronic obstructive pulmonary disease) (HCC), COPD exacerbation (HCC), COVID, Emphysema of lung (HCC), Examination of participant in clinical trial, Hyperlipidemia, Hypertension, Hypokalemia, Lung nodule, Oral thrush, Thrush, and Tongue irritation.    SURGICAL HISTORY      has a past surgical history that includes hernia repair; Coronary angioplasty with stent (01/01/2011); Coronary angioplasty with stent (05/01/2013); Cardiac catheterization (2011, 2013); Colonoscopy; and Nose surgery (Left, 3/5/2024).    CURRENT MEDICATIONS       Previous Medications    ASPIRIN 81 MG TABLET    Take 1 tablet by mouth daily    DILTIAZEM (CARDIZEM CD) 240 MG EXTENDED RELEASE CAPSULE    TAKE 1 CAPSULE BY MOUTH

## 2024-04-03 NOTE — H&P
MCH 27.7 26 - 34 pg    MCHC 33.9 31 - 37 g/dL    RDW 14.4 12.5 - 15.4 %    Platelets 329 140 - 450 k/uL    MPV 7.3 6.0 - 12.0 fL    Neutrophils % 91 (H) 36 - 66 %    Lymphocytes % 5 (L) 24 - 44 %    Monocytes % 4 2 - 11 %    Eosinophils % 0 (L) 1 - 4 %    Basophils % 0 0 - 2 %    Neutrophils Absolute 19.30 (H) 1.8 - 7.7 k/uL    Lymphocytes Absolute 1.20 1.0 - 4.8 k/uL    Monocytes Absolute 0.80 0.1 - 1.2 k/uL    Eosinophils Absolute 0.00 0.0 - 0.4 k/uL    Basophils Absolute 0.10 0.0 - 0.2 k/uL   Comprehensive Metabolic Panel    Collection Time: 04/03/24  7:20 AM   Result Value Ref Range    Sodium 130 (L) 135 - 144 mmol/L    Potassium 3.4 (L) 3.7 - 5.3 mmol/L    Chloride 89 (L) 98 - 107 mmol/L    CO2 22 20 - 31 mmol/L    Anion Gap 19 (H) 9 - 17 mmol/L    Glucose 206 (H) 70 - 99 mg/dL    BUN 12 8 - 23 mg/dL    Creatinine 1.0 0.7 - 1.2 mg/dL    Est, Glom Filt Rate 82 >60 mL/min/1.73m2    Calcium 9.3 8.6 - 10.4 mg/dL    Total Protein 7.9 6.4 - 8.3 g/dL    Albumin 4.4 3.5 - 5.2 g/dL    Albumin/Globulin Ratio 1.3 1.0 - 2.5    Total Bilirubin 0.8 0.3 - 1.2 mg/dL    Alkaline Phosphatase 91 40 - 129 U/L    ALT 20 5 - 41 U/L    AST 10 <40 U/L   Magnesium    Collection Time: 04/03/24  7:20 AM   Result Value Ref Range    Magnesium 1.5 (L) 1.6 - 2.6 mg/dL   Brain Natriuretic Peptide    Collection Time: 04/03/24  7:20 AM   Result Value Ref Range    Pro-BNP 1,150 (H) <300 pg/mL   Troponin    Collection Time: 04/03/24  7:20 AM   Result Value Ref Range    Troponin, High Sensitivity 12 0 - 22 ng/L   COVID-19, Rapid    Collection Time: 04/03/24  7:20 AM    Specimen: Nasopharyngeal Swab   Result Value Ref Range    Specimen Description .NASOPHARYNGEAL SWAB     SARS-CoV-2, Rapid Not Detected Not Detected   Rapid influenza A/B antigens    Collection Time: 04/03/24  7:20 AM    Specimen: Nasopharyngeal   Result Value Ref Range    Flu A Antigen NEGATIVE NEGATIVE    Flu B Antigen NEGATIVE NEGATIVE   Lactic Acid    Collection Time: 04/03/24

## 2024-04-04 LAB
ANION GAP SERPL CALCULATED.3IONS-SCNC: 11 MMOL/L (ref 9–17)
BASOPHILS # BLD: 0 K/UL (ref 0–0.2)
BASOPHILS NFR BLD: 0 % (ref 0–2)
BUN SERPL-MCNC: 13 MG/DL (ref 8–23)
CALCIUM SERPL-MCNC: 9.2 MG/DL (ref 8.6–10.4)
CHLORIDE SERPL-SCNC: 99 MMOL/L (ref 98–107)
CO2 SERPL-SCNC: 26 MMOL/L (ref 20–31)
CREAT SERPL-MCNC: 0.8 MG/DL (ref 0.7–1.2)
EOSINOPHIL # BLD: 0 K/UL (ref 0–0.4)
EOSINOPHILS RELATIVE PERCENT: 0 % (ref 1–4)
ERYTHROCYTE [DISTWIDTH] IN BLOOD BY AUTOMATED COUNT: 14.5 % (ref 12.5–15.4)
GFR SERPL CREATININE-BSD FRML MDRD: >90 ML/MIN/1.73M2
GLUCOSE BLD-MCNC: 224 MG/DL (ref 75–110)
GLUCOSE BLD-MCNC: 232 MG/DL (ref 75–110)
GLUCOSE BLD-MCNC: 266 MG/DL (ref 75–110)
GLUCOSE BLD-MCNC: 332 MG/DL (ref 75–110)
GLUCOSE SERPL-MCNC: 218 MG/DL (ref 70–99)
HCT VFR BLD AUTO: 34 % (ref 41–53)
HGB BLD-MCNC: 11.3 G/DL (ref 13.5–17.5)
LACTATE BLDV-SCNC: 1.4 MMOL/L (ref 0.5–2.2)
LYMPHOCYTES NFR BLD: 0.6 K/UL (ref 1–4.8)
LYMPHOCYTES RELATIVE PERCENT: 4 % (ref 24–44)
MAGNESIUM SERPL-MCNC: 2.4 MG/DL (ref 1.6–2.6)
MCH RBC QN AUTO: 27.1 PG (ref 26–34)
MCHC RBC AUTO-ENTMCNC: 33.4 G/DL (ref 31–37)
MCV RBC AUTO: 81.2 FL (ref 80–100)
MONOCYTES NFR BLD: 0.6 K/UL (ref 0.1–1.2)
MONOCYTES NFR BLD: 4 % (ref 2–11)
NEUTROPHILS NFR BLD: 92 % (ref 36–66)
NEUTS SEG NFR BLD: 14.4 K/UL (ref 1.8–7.7)
PLATELET # BLD AUTO: 263 K/UL (ref 140–450)
PMV BLD AUTO: 7.2 FL (ref 6–12)
POTASSIUM SERPL-SCNC: 4.1 MMOL/L (ref 3.7–5.3)
RBC # BLD AUTO: 4.18 M/UL (ref 4.5–5.9)
SODIUM SERPL-SCNC: 136 MMOL/L (ref 135–144)
WBC OTHER # BLD: 15.7 K/UL (ref 3.5–11)

## 2024-04-04 PROCEDURE — 82947 ASSAY GLUCOSE BLOOD QUANT: CPT

## 2024-04-04 PROCEDURE — 6370000000 HC RX 637 (ALT 250 FOR IP)

## 2024-04-04 PROCEDURE — 87641 MR-STAPH DNA AMP PROBE: CPT

## 2024-04-04 PROCEDURE — 83735 ASSAY OF MAGNESIUM: CPT

## 2024-04-04 PROCEDURE — 85025 COMPLETE CBC W/AUTO DIFF WBC: CPT

## 2024-04-04 PROCEDURE — 6360000002 HC RX W HCPCS: Performed by: STUDENT IN AN ORGANIZED HEALTH CARE EDUCATION/TRAINING PROGRAM

## 2024-04-04 PROCEDURE — APPSS30 APP SPLIT SHARED TIME 16-30 MINUTES

## 2024-04-04 PROCEDURE — 94760 N-INVAS EAR/PLS OXIMETRY 1: CPT

## 2024-04-04 PROCEDURE — 83605 ASSAY OF LACTIC ACID: CPT

## 2024-04-04 PROCEDURE — 6370000000 HC RX 637 (ALT 250 FOR IP): Performed by: STUDENT IN AN ORGANIZED HEALTH CARE EDUCATION/TRAINING PROGRAM

## 2024-04-04 PROCEDURE — 6360000002 HC RX W HCPCS

## 2024-04-04 PROCEDURE — 36415 COLL VENOUS BLD VENIPUNCTURE: CPT

## 2024-04-04 PROCEDURE — 99232 SBSQ HOSP IP/OBS MODERATE 35: CPT | Performed by: STUDENT IN AN ORGANIZED HEALTH CARE EDUCATION/TRAINING PROGRAM

## 2024-04-04 PROCEDURE — 80048 BASIC METABOLIC PNL TOTAL CA: CPT

## 2024-04-04 PROCEDURE — 1200000000 HC SEMI PRIVATE

## 2024-04-04 PROCEDURE — 2700000000 HC OXYGEN THERAPY PER DAY

## 2024-04-04 PROCEDURE — 94640 AIRWAY INHALATION TREATMENT: CPT

## 2024-04-04 PROCEDURE — 2580000003 HC RX 258: Performed by: STUDENT IN AN ORGANIZED HEALTH CARE EDUCATION/TRAINING PROGRAM

## 2024-04-04 RX ADMIN — INSULIN LISPRO 4 UNITS: 100 INJECTION, SOLUTION INTRAVENOUS; SUBCUTANEOUS at 21:09

## 2024-04-04 RX ADMIN — ROSUVASTATIN CALCIUM 40 MG: 20 TABLET, FILM COATED ORAL at 11:12

## 2024-04-04 RX ADMIN — ALBUTEROL SULFATE 2.5 MG: 2.5 SOLUTION RESPIRATORY (INHALATION) at 14:11

## 2024-04-04 RX ADMIN — ALBUTEROL SULFATE 2.5 MG: 2.5 SOLUTION RESPIRATORY (INHALATION) at 19:48

## 2024-04-04 RX ADMIN — METOPROLOL TARTRATE 25 MG: 25 TABLET, FILM COATED ORAL at 07:51

## 2024-04-04 RX ADMIN — METHYLPREDNISOLONE SODIUM SUCCINATE 40 MG: 40 INJECTION, POWDER, FOR SOLUTION INTRAMUSCULAR; INTRAVENOUS at 17:35

## 2024-04-04 RX ADMIN — METOPROLOL TARTRATE 25 MG: 25 TABLET, FILM COATED ORAL at 20:49

## 2024-04-04 RX ADMIN — PRASUGREL 10 MG: 10 TABLET, FILM COATED ORAL at 07:51

## 2024-04-04 RX ADMIN — ALBUTEROL SULFATE 2.5 MG: 2.5 SOLUTION RESPIRATORY (INHALATION) at 08:16

## 2024-04-04 RX ADMIN — METHYLPREDNISOLONE SODIUM SUCCINATE 40 MG: 40 INJECTION, POWDER, FOR SOLUTION INTRAMUSCULAR; INTRAVENOUS at 00:33

## 2024-04-04 RX ADMIN — SODIUM CHLORIDE 480 ML: 9 INJECTION, SOLUTION INTRAVENOUS at 04:41

## 2024-04-04 RX ADMIN — SODIUM CHLORIDE, PRESERVATIVE FREE 10 ML: 5 INJECTION INTRAVENOUS at 07:51

## 2024-04-04 RX ADMIN — METHYLPREDNISOLONE SODIUM SUCCINATE 40 MG: 40 INJECTION, POWDER, FOR SOLUTION INTRAMUSCULAR; INTRAVENOUS at 07:51

## 2024-04-04 RX ADMIN — ENOXAPARIN SODIUM 40 MG: 100 INJECTION SUBCUTANEOUS at 07:51

## 2024-04-04 RX ADMIN — INSULIN LISPRO 2 UNITS: 100 INJECTION, SOLUTION INTRAVENOUS; SUBCUTANEOUS at 17:34

## 2024-04-04 RX ADMIN — METHYLPREDNISOLONE SODIUM SUCCINATE 40 MG: 40 INJECTION, POWDER, FOR SOLUTION INTRAMUSCULAR; INTRAVENOUS at 23:50

## 2024-04-04 RX ADMIN — INSULIN LISPRO 1 UNITS: 100 INJECTION, SOLUTION INTRAVENOUS; SUBCUTANEOUS at 12:28

## 2024-04-04 RX ADMIN — VANCOMYCIN HYDROCHLORIDE 1000 MG: 1 INJECTION, POWDER, LYOPHILIZED, FOR SOLUTION INTRAVENOUS at 04:44

## 2024-04-04 RX ADMIN — INSULIN LISPRO 1 UNITS: 100 INJECTION, SOLUTION INTRAVENOUS; SUBCUTANEOUS at 08:28

## 2024-04-04 RX ADMIN — DILTIAZEM HYDROCHLORIDE 240 MG: 120 CAPSULE, COATED, EXTENDED RELEASE ORAL at 11:12

## 2024-04-04 RX ADMIN — SODIUM CHLORIDE, PRESERVATIVE FREE 10 ML: 5 INJECTION INTRAVENOUS at 20:50

## 2024-04-04 RX ADMIN — ASPIRIN 81 MG: 81 TABLET, COATED ORAL at 07:51

## 2024-04-04 RX ADMIN — VANCOMYCIN HYDROCHLORIDE 1000 MG: 1 INJECTION, POWDER, LYOPHILIZED, FOR SOLUTION INTRAVENOUS at 17:42

## 2024-04-04 ASSESSMENT — PAIN SCALES - GENERAL
PAINLEVEL_OUTOF10: 0
PAINLEVEL_OUTOF10: 0

## 2024-04-04 NOTE — PROGRESS NOTES
@[facility] CorporationEDLOGO@    [facility]   IN-PATIENT SERVICE   [facility]    Progress Note    4/4/2024    9:11 AM    Name:   Shantell Walsh  MRN:     [id number]     Acct:      [id number]   Room:   [facility]304-01   Day:  1  Admit Date:  4/3/2024  7:09 AM    PCP:   David Joyce MD  Code Status:  DNR-CC    Subjective:     Mr. Walsh is being seen in follow up.  He is resting in bed on 2 L nasal cannula.  He states overall, he has noticed improvement in his breathing since yesterday.  He does have concerns that he is still on oxygen.  He indicates that his Pulmonologist told hm he qualified for home oxygen use, however he declined it.  We discussed that we will continue to wean down his oxygen as he improves with the goal of weaning him off oxygen.      Medications:     Allergies:    Allergies   Allergen Reactions    Pcn [Penicillins]        Current Meds:   Scheduled Meds:    vancomycin (VANCOCIN) intermittent dosing (placeholder)   Other RX Placeholder    vancomycin  1,000 mg IntraVENous Q12H    sodium chloride flush  5-40 mL IntraVENous 2 times per day    enoxaparin  40 mg SubCUTAneous Daily    aspirin  81 mg Oral Daily    dilTIAZem  240 mg Oral Daily    metoprolol tartrate  25 mg Oral BID    prasugrel  10 mg Oral Daily    rosuvastatin  40 mg Oral Daily    fluticasone-umeclidin-vilant  1 puff Inhalation Daily    methylPREDNISolone  40 mg IntraVENous Q8H    insulin lispro  0-4 Units SubCUTAneous TID WC    insulin lispro  0-4 Units SubCUTAneous Nightly    albuterol  2.5 mg Nebulization TID RT     Continuous Infusions:    sodium chloride 480 mL (04/04/24 0441)     PRN Meds: sodium chloride flush, sodium chloride, potassium chloride **OR** potassium alternative oral replacement **OR** potassium chloride, magnesium sulfate, ondansetron **OR** ondansetron, polyethylene glycol, acetaminophen **OR** acetaminophen, albuterol    Data:     Vitals:  /81   Pulse 61   Temp 97.9 °F (36.6 °C) (Oral)   Resp 22   Ht

## 2024-04-04 NOTE — PROGRESS NOTES
Physician Progress Note      PATIENT:               ILANA VALDEZ  CSN #:                  [id number]  :                       1955  ADMIT DATE:       4/3/2024 7:09 AM  DISCH DATE:  RESPONDING  PROVIDER #:        Kimani Xavier MD          QUERY TEXT:    Pt admitted with COPD- exacerbation, pneumonia, acute respiratory failure. Pt   noted to have WBC- 21.5, 15.7, lac acid- 4.4,4.0,1.5, IVF bolus 1.5 L bolus-   4/3. If possible, please document in the progress notes and discharge summary   if you are evaluating and /or treating any of the following:    The medical record reflects the following:  Risk Factors: Pneumonia, Lac ACidosis, hypokalemia, COPD exacerbation, Acute   Respiratory Failure.  Clinical Indicators: c/o worsening SOB and to ED for evaluation with WBC-   21.5, 15.7. LAC ACID- 4.4, 4.0, 1.4. POTASSIUM - 3.4,  4.1. MAGNESIUM- 1.5,   2.4. CXR- 4/3 -Bibasilar airspace disease with focal consolidation right lower   lobe suggesting multifocal pneumonia.Recommend follow-up to resolution.  Treatment: IV ATB. - Levaquin, vancomycin 1000 mg - every 12 hours.  IVF 1.5 L   BOLUS- NS. Oxygen therapy - 5-2 L/NC.    Thank you, please contact me for any questions.  Roosevelt Weeks RN, [facility]  cell- [phone]  office hours - [address]  Options provided:  -- Sepsis due to pneumonia, present on admission  -- pneumonia without Sepsis  -- Sepsis was ruled out  -- Other - I will add my own diagnosis  -- Disagree - Not applicable / Not valid  -- Disagree - Clinically unable to determine / Unknown  -- Refer to Clinical Documentation Reviewer    PROVIDER RESPONSE TEXT:    This patient has sepsis due to pneumonia which was present on admission.    Query created by: Roosevelt Weeks on 2024 1:25 PM      Electronically signed by:  Kimani Xavier MD 2024 6:04 PM

## 2024-04-04 NOTE — PLAN OF CARE
Problem: Respiratory - Adult  Goal: Achieves optimal ventilation and oxygenation  4/4/2024 0820 by Jing Bradley RCP  Outcome: Progressing  Flowsheets (Taken 4/4/2024 0820)  Achieves optimal ventilation and oxygenation:   Assess for changes in respiratory status   Respiratory therapy support as indicated   Assess and instruct to report shortness of breath or any respiratory difficulty   Assess for changes in mentation and behavior

## 2024-04-04 NOTE — PLAN OF CARE
Problem: Discharge Planning  Goal: Discharge to home or other facility with appropriate resources  Outcome: Progressing  Flowsheets (Taken 4/3/2024 2009)  Discharge to home or other facility with appropriate resources:   Identify barriers to discharge with patient and caregiver   Arrange for needed discharge resources and transportation as appropriate   Identify discharge learning needs (meds, wound care, etc)   Arrange for interpreters to assist at discharge as needed   Refer to discharge planning if patient needs post-hospital services based on physician order or complex needs related to functional status, cognitive ability or social support system     Problem: Respiratory - Adult  Goal: Achieves optimal ventilation and oxygenation  4/4/2024 0152 by Lisa Cardenas RN  Outcome: Progressing  4/3/2024 2009 by Sandrita Garcia RCP  Outcome: Progressing  Flowsheets (Taken 4/3/2024 2009)  Achieves optimal ventilation and oxygenation:   Assess for changes in respiratory status   Respiratory therapy support as indicated   Oxygen supplementation based on oxygen saturation or arterial blood gases   Encourage broncho-pulmonary hygiene including cough, deep breathe, incentive spirometry   Assess and instruct to report shortness of breath or any respiratory difficulty     Problem: Safety - Adult  Goal: Free from fall injury  Outcome: Progressing

## 2024-04-04 NOTE — PROGRESS NOTES
[facility]   Pharmacy Pharmacokinetic Monitoring Service - Vancomycin     Shantell Walsh is a 69 y.o. male starting on vancomycin therapy for bacteremia. Pharmacy consulted by Dr. Xavier  for monitoring and adjustment.    Target Concentration: Goal AUC/DEIDRE 400-600 mg*hr/L    Additional Antimicrobials: na    Pertinent Laboratory Values:   Wt Readings from Last 1 Encounters:   04/03/24 83.6 kg (184 lb 4.9 oz)     Temp Readings from Last 1 Encounters:   04/03/24 97.5 °F (36.4 °C) (Oral)     Estimated Creatinine Clearance: 74 mL/min (based on SCr of 1 mg/dL).  Recent Labs     04/03/24  0720   CREATININE 1.0   BUN 12   WBC 21.3*     Procalcitonin: n/a    Pertinent Cultures:  Culture Date Source Results   4/4 Blood 1/2 Gram positive cocci in pairs   MRSA Nasal Swab: was ordered by provider, awaiting results.    Plan:  Dosing recommendations based on Bayesian software  Start vancomycin 1000mg IVPB every 12 hours  Anticipated AUC of 537 and trough concentration of 16.1 at steady state  Renal labs as indicated   Vancomycin concentration ordered for  @    Pharmacy will continue to monitor patient and adjust therapy as indicated    Thank you for the consult,  Saurabh Mcdowell RPH  4/4/2024 4:14 AM

## 2024-04-04 NOTE — PLAN OF CARE
Problem: Discharge Planning  Goal: Discharge to home or other facility with appropriate resources  Outcome: Progressing     Problem: Respiratory - Adult  Goal: Achieves optimal ventilation and oxygenation  4/4/2024 1757 by Lachelle Mendoza RN  Outcome: Progressing     Problem: Chronic Conditions and Co-morbidities  Goal: Patient's chronic conditions and co-morbidity symptoms are monitored and maintained or improved  Outcome: Progressing

## 2024-04-04 NOTE — PLAN OF CARE
Problem: Respiratory - Adult  Goal: Achieves optimal ventilation and oxygenation  Outcome: Progressing  Flowsheets (Taken 4/3/2024 2009)  Achieves optimal ventilation and oxygenation:   Assess for changes in respiratory status   Respiratory therapy support as indicated   Oxygen supplementation based on oxygen saturation or arterial blood gases   Encourage broncho-pulmonary hygiene including cough, deep breathe, incentive spirometry   Assess and instruct to report shortness of breath or any respiratory difficulty

## 2024-04-04 NOTE — PROGRESS NOTES
04/04/24 1252   Encounter Summary   Encounter Overview/Reason  Volunteer Encounter   Service Provided For: Patient   Referral/Consult From: Beatriz   Last Encounter  04/04/24   Spiritual/Emotional needs   Type Spiritual Support   Assessment/Intervention/Outcome   Intervention Prayer (assurance of)/[place]

## 2024-04-05 LAB
ANION GAP SERPL CALCULATED.3IONS-SCNC: 10 MMOL/L (ref 9–17)
BASOPHILS # BLD: 0 K/UL (ref 0–0.2)
BASOPHILS NFR BLD: 0 % (ref 0–2)
BUN SERPL-MCNC: 22 MG/DL (ref 8–23)
CALCIUM SERPL-MCNC: 9 MG/DL (ref 8.6–10.4)
CHLORIDE SERPL-SCNC: 101 MMOL/L (ref 98–107)
CO2 SERPL-SCNC: 27 MMOL/L (ref 20–31)
CREAT SERPL-MCNC: 0.8 MG/DL (ref 0.7–1.2)
EOSINOPHIL # BLD: 0 K/UL (ref 0–0.4)
EOSINOPHILS RELATIVE PERCENT: 0 % (ref 1–4)
ERYTHROCYTE [DISTWIDTH] IN BLOOD BY AUTOMATED COUNT: 14.6 % (ref 12.5–15.4)
GFR SERPL CREATININE-BSD FRML MDRD: >90 ML/MIN/1.73M2
GLUCOSE BLD-MCNC: 250 MG/DL (ref 75–110)
GLUCOSE BLD-MCNC: 265 MG/DL (ref 75–110)
GLUCOSE BLD-MCNC: 301 MG/DL (ref 75–110)
GLUCOSE BLD-MCNC: 320 MG/DL (ref 75–110)
GLUCOSE SERPL-MCNC: 276 MG/DL (ref 70–99)
HCT VFR BLD AUTO: 33.1 % (ref 41–53)
HGB BLD-MCNC: 11.2 G/DL (ref 13.5–17.5)
LYMPHOCYTES NFR BLD: 0.6 K/UL (ref 1–4.8)
LYMPHOCYTES RELATIVE PERCENT: 5 % (ref 24–44)
MCH RBC QN AUTO: 27.6 PG (ref 26–34)
MCHC RBC AUTO-ENTMCNC: 33.8 G/DL (ref 31–37)
MCV RBC AUTO: 81.5 FL (ref 80–100)
MICROORGANISM SPEC CULT: ABNORMAL
MONOCYTES NFR BLD: 0.7 K/UL (ref 0.1–1.2)
MONOCYTES NFR BLD: 5 % (ref 2–11)
MRSA, DNA, NASAL: NEGATIVE
NEUTROPHILS NFR BLD: 90 % (ref 36–66)
NEUTS SEG NFR BLD: 12.8 K/UL (ref 1.8–7.7)
PLATELET # BLD AUTO: 342 K/UL (ref 140–450)
PMV BLD AUTO: 7.7 FL (ref 6–12)
POTASSIUM SERPL-SCNC: 3.9 MMOL/L (ref 3.7–5.3)
RBC # BLD AUTO: 4.06 M/UL (ref 4.5–5.9)
SERVICE CMNT-IMP: ABNORMAL
SODIUM SERPL-SCNC: 138 MMOL/L (ref 135–144)
SPECIMEN DESCRIPTION: ABNORMAL
SPECIMEN DESCRIPTION: NORMAL
VANCOMYCIN SERPL-MCNC: 11.1 UG/ML
WBC OTHER # BLD: 14.2 K/UL (ref 3.5–11)

## 2024-04-05 PROCEDURE — 6370000000 HC RX 637 (ALT 250 FOR IP)

## 2024-04-05 PROCEDURE — 80202 ASSAY OF VANCOMYCIN: CPT

## 2024-04-05 PROCEDURE — 6360000002 HC RX W HCPCS: Performed by: STUDENT IN AN ORGANIZED HEALTH CARE EDUCATION/TRAINING PROGRAM

## 2024-04-05 PROCEDURE — 80048 BASIC METABOLIC PNL TOTAL CA: CPT

## 2024-04-05 PROCEDURE — 2580000003 HC RX 258: Performed by: STUDENT IN AN ORGANIZED HEALTH CARE EDUCATION/TRAINING PROGRAM

## 2024-04-05 PROCEDURE — 82947 ASSAY GLUCOSE BLOOD QUANT: CPT

## 2024-04-05 PROCEDURE — 2700000000 HC OXYGEN THERAPY PER DAY

## 2024-04-05 PROCEDURE — 6360000002 HC RX W HCPCS

## 2024-04-05 PROCEDURE — 85025 COMPLETE CBC W/AUTO DIFF WBC: CPT

## 2024-04-05 PROCEDURE — 94640 AIRWAY INHALATION TREATMENT: CPT

## 2024-04-05 PROCEDURE — 6370000000 HC RX 637 (ALT 250 FOR IP): Performed by: STUDENT IN AN ORGANIZED HEALTH CARE EDUCATION/TRAINING PROGRAM

## 2024-04-05 PROCEDURE — 94761 N-INVAS EAR/PLS OXIMETRY MLT: CPT

## 2024-04-05 PROCEDURE — 36415 COLL VENOUS BLD VENIPUNCTURE: CPT

## 2024-04-05 PROCEDURE — 1200000000 HC SEMI PRIVATE

## 2024-04-05 PROCEDURE — 99232 SBSQ HOSP IP/OBS MODERATE 35: CPT | Performed by: STUDENT IN AN ORGANIZED HEALTH CARE EDUCATION/TRAINING PROGRAM

## 2024-04-05 RX ORDER — ALBUTEROL SULFATE 2.5 MG/3ML
2.5 SOLUTION RESPIRATORY (INHALATION)
Status: DISCONTINUED | OUTPATIENT
Start: 2024-04-05 | End: 2024-04-06

## 2024-04-05 RX ORDER — LEVOFLOXACIN 5 MG/ML
750 INJECTION, SOLUTION INTRAVENOUS EVERY 24 HOURS
Status: DISCONTINUED | OUTPATIENT
Start: 2024-04-05 | End: 2024-04-07 | Stop reason: HOSPADM

## 2024-04-05 RX ADMIN — INSULIN LISPRO 3 UNITS: 100 INJECTION, SOLUTION INTRAVENOUS; SUBCUTANEOUS at 18:51

## 2024-04-05 RX ADMIN — INSULIN LISPRO 2 UNITS: 100 INJECTION, SOLUTION INTRAVENOUS; SUBCUTANEOUS at 13:08

## 2024-04-05 RX ADMIN — METOPROLOL TARTRATE 25 MG: 25 TABLET, FILM COATED ORAL at 08:47

## 2024-04-05 RX ADMIN — PRASUGREL 10 MG: 10 TABLET, FILM COATED ORAL at 08:46

## 2024-04-05 RX ADMIN — VANCOMYCIN HYDROCHLORIDE 750 MG: 750 INJECTION, POWDER, LYOPHILIZED, FOR SOLUTION INTRAVENOUS at 17:30

## 2024-04-05 RX ADMIN — ASPIRIN 81 MG: 81 TABLET, COATED ORAL at 08:46

## 2024-04-05 RX ADMIN — VANCOMYCIN HYDROCHLORIDE 1000 MG: 1 INJECTION, POWDER, LYOPHILIZED, FOR SOLUTION INTRAVENOUS at 04:21

## 2024-04-05 RX ADMIN — ROSUVASTATIN CALCIUM 40 MG: 20 TABLET, FILM COATED ORAL at 08:46

## 2024-04-05 RX ADMIN — METHYLPREDNISOLONE SODIUM SUCCINATE 40 MG: 40 INJECTION, POWDER, FOR SOLUTION INTRAMUSCULAR; INTRAVENOUS at 08:47

## 2024-04-05 RX ADMIN — ALBUTEROL SULFATE 2.5 MG: 2.5 SOLUTION RESPIRATORY (INHALATION) at 12:27

## 2024-04-05 RX ADMIN — ALBUTEROL SULFATE 2.5 MG: 2.5 SOLUTION RESPIRATORY (INHALATION) at 15:46

## 2024-04-05 RX ADMIN — SODIUM CHLORIDE 25 ML: 9 INJECTION, SOLUTION INTRAVENOUS at 04:20

## 2024-04-05 RX ADMIN — ALBUTEROL SULFATE 2.5 MG: 2.5 SOLUTION RESPIRATORY (INHALATION) at 09:07

## 2024-04-05 RX ADMIN — SODIUM CHLORIDE, PRESERVATIVE FREE 10 ML: 5 INJECTION INTRAVENOUS at 08:56

## 2024-04-05 RX ADMIN — INSULIN LISPRO 4 UNITS: 100 INJECTION, SOLUTION INTRAVENOUS; SUBCUTANEOUS at 22:29

## 2024-04-05 RX ADMIN — METHYLPREDNISOLONE SODIUM SUCCINATE 40 MG: 40 INJECTION, POWDER, FOR SOLUTION INTRAMUSCULAR; INTRAVENOUS at 17:26

## 2024-04-05 RX ADMIN — ENOXAPARIN SODIUM 40 MG: 100 INJECTION SUBCUTANEOUS at 08:50

## 2024-04-05 RX ADMIN — LEVOFLOXACIN 750 MG: 750 INJECTION, SOLUTION INTRAVENOUS at 08:53

## 2024-04-05 RX ADMIN — INSULIN LISPRO 2 UNITS: 100 INJECTION, SOLUTION INTRAVENOUS; SUBCUTANEOUS at 08:48

## 2024-04-05 RX ADMIN — DILTIAZEM HYDROCHLORIDE 240 MG: 120 CAPSULE, COATED, EXTENDED RELEASE ORAL at 08:45

## 2024-04-05 RX ADMIN — METOPROLOL TARTRATE 25 MG: 25 TABLET, FILM COATED ORAL at 22:29

## 2024-04-05 RX ADMIN — ALBUTEROL SULFATE 2.5 MG: 2.5 SOLUTION RESPIRATORY (INHALATION) at 19:46

## 2024-04-05 ASSESSMENT — PAIN SCALES - GENERAL
PAINLEVEL_OUTOF10: 0
PAINLEVEL_OUTOF10: 0

## 2024-04-05 NOTE — PROGRESS NOTES
04/05/24 0907   Care Plan - Respiratory Goals   Achieves optimal ventilation and oxygenation Assess for changes in respiratory status;Oxygen supplementation based on oxygen saturation or arterial blood gases;Assess and instruct to report shortness of breath or any respiratory difficulty;Respiratory therapy support as indicated

## 2024-04-05 NOTE — CARE COORDINATION
Case Management Assessment  Initial Evaluation    Date/Time of Evaluation: 4/5/2024 5:47 PM  Assessment Completed by: Amna Farrar RN    If patient is discharged prior to next notation, then this note serves as note for discharge by case management.    Patient Name: Shantell Walsh                   YOB: 1955  Diagnosis: Multifocal pneumonia [J18.9]  Acute pneumonia [J18.9]                   Date / Time: 4/3/2024  7:09 AM    Patient Admission Status: Inpatient   Readmission Risk (Low < 19, Mod (19-27), High > 27): Readmission Risk Score: 9.9    Current PCP: David Joyce MD  PCP verified by CM? Yes    Chart Reviewed: Yes      History Provided by: Patient  Patient Orientation: Alert and Oriented    Patient Cognition: Alert    Hospitalization in the last 30 days (Readmission):  No    If yes, Readmission Assessment in  Navigator will be completed.    Advance Directives:      Code Status: DNR-CC   Patient's Primary Decision Maker is: Legal Next of Kin      Discharge Planning:    Patient lives with: Spouse/Significant Other Type of Home: House  Primary Care Giver: Self  Patient Support Systems include: Spouse/Significant Other   Current Financial resources: Medicare  Current community resources: None  Current services prior to admission: Durable Medical Equipment            Current DME: Cane, Other (Comment) (BP Cuff)            Type of Home Care services:  None    ADLS  Prior functional level: Independent in ADLs/IADLs  Current functional level: Independent in ADLs/IADLs    PT AM-PAC:   /24  OT AM-PAC:   /24    Family can provide assistance at DC: Yes  Would you like Case Management to discuss the discharge plan with any other family members/significant others, and if so, who?    Plans to Return to Present Housing: Yes  Other Identified Issues/Barriers to RETURNING to current housing: none  Potential Assistance needed at discharge: N/A            Potential DME:    Patient expects to discharge to:

## 2024-04-05 NOTE — PROGRESS NOTES
[facility]   Pharmacy Pharmacokinetic Monitoring Service - Vancomycin    Consulting Provider: Morenita   Indication: pneumonia  Target Concentration: Goal AUC/DEIDRE 400-600 mg*hr/L  Day of Therapy: 2  Additional Antimicrobials: Levaquin    Pertinent Laboratory Values:   Wt Readings from Last 1 Encounters:   04/03/24 83.6 kg (184 lb 4.9 oz)     Temp Readings from Last 1 Encounters:   04/05/24 98.1 °F (36.7 °C) (Oral)     Estimated Creatinine Clearance: 93 mL/min (based on SCr of 0.8 mg/dL).  Recent Labs     04/04/24  0550 04/05/24  0546   CREATININE 0.8 0.8   BUN 13 22   WBC 15.7* 14.2*     Procalcitonin:     Pertinent Cultures:  Culture Date Source Results        MRSA Nasal Swab: negative    Recent vancomycin administrations                     vancomycin (VANCOCIN) 1,000 mg in sodium chloride 0.9 % 250 mL IVPB ([id number]) (mg) 1,000 mg New Bag 04/05/24 0421     1,000 mg New Bag 04/04/24 1742     1,000 mg New Bag  0444                    Assessment:  Date/Time Current Dose Concentration Timing of Concentration (h) AUC   4/4/24 12:13 1000 mg every 12 hrs 11.1 7.75 hrs post dose 464   Note: Serum concentrations collected for AUC dosing may appear elevated if collected in close proximity to the dose administered, this is not necessarily an indication of toxicity    Plan:  Current dosing regimen is  currently therapeutic but concerned patient may accumulate  \"Decrease dose to 750 mg every 12 hours  Repeat vancomycin concentration ordered for 4/6/24 @ noon   Pharmacy will continue to monitor patient and adjust therapy as indicated    Thank you for the consult,  [facility]  4/5/2024 1:19 PM

## 2024-04-05 NOTE — PLAN OF CARE
Problem: Discharge Planning  Goal: Discharge to home or other facility with appropriate resources  4/5/2024 0050 by Lisa Cardenas RN  Outcome: Progressing  Flowsheets (Taken 4/4/2024 2000)  Discharge to home or other facility with appropriate resources:   Identify barriers to discharge with patient and caregiver   Arrange for needed discharge resources and transportation as appropriate   Identify discharge learning needs (meds, wound care, etc)   Arrange for interpreters to assist at discharge as needed  4/4/2024 1757 by Lachelle Mendoza RN  Outcome: Progressing     Problem: Respiratory - Adult  Goal: Achieves optimal ventilation and oxygenation  4/5/2024 0050 by Lisa Cardenas RN  Outcome: Progressing  Flowsheets (Taken 4/4/2024 2000)  Achieves optimal ventilation and oxygenation:   Assess for changes in respiratory status   Assess for changes in mentation and behavior  4/4/2024 1757 by Lachelle Mendoza RN  Outcome: Progressing     Problem: Safety - Adult  Goal: Free from fall injury  Outcome: Progressing     Problem: Pain  Goal: Verbalizes/displays adequate comfort level or baseline comfort level  Outcome: Progressing     Problem: Chronic Conditions and Co-morbidities  Goal: Patient's chronic conditions and co-morbidity symptoms are monitored and maintained or improved  4/5/2024 0050 by Lisa Cardenas RN  Outcome: Progressing  4/4/2024 1757 by Lachelle Mendoza RN  Outcome: Progressing     Problem: ABCDS Injury Assessment  Goal: Absence of physical injury  Outcome: Progressing

## 2024-04-05 NOTE — PROGRESS NOTES
[facility]  Office: [phone]  Oscar Aguirre DO, Osmin Alvarez, DO, Preston Knowles DO, Geovani Ogden, DO, Dean Soto MD, Mary Medina MD, Mike Whitley MD, Vanessa Arora MD,  Yaya Chatman MD, Josee Hays MD, Bruce Ozuna MD,  Ricky Valdivia DO, Rusty Parmar MD, Bryan Ahn MD, Roosevelt Aguirre DO, Marilin Moraes MD,  Ravi Sanchez DO, Ashleigh Nieto MD, Laura Knapp MD, Yulissa Hwang MD, Jacque Pacheco MD,  Hema German MD, Ryan Garcia MD, Nicolás Bynum MD, Kimani Xavier MD, Santos Carrizales MD, Mark Murphy MD, Miguel Mccoy DO, Bryan Aj DO, Jeff Pizarro MD,  Cleve Contreras MD, Shirley Waterhouse, CNP,  Naomie Wilson CNP, Jung Ernst, CNP,  Donna Valdes, DNP, Connie Mcbride, CNP, Lexie Ochoa, CNP, Trupti Mortensen, CNP, Etta Carrera, CNP, Tasha Olivarez, PA-C, Millie Norton PA-C, Josefina Cannon, CNP, Elen Escobar, CNP, Colin Escoto, CNP, Celina Brown, CNP, Amada Martínez, CNP, Marycarmen Johnson, CNS, Julee Lewis, CNP, Chantal Hendricks CNP, Tracy Schwab, CNP         [facility]   IN-PATIENT SERVICE   [facility]    Progress Note    4/5/2024    10:54 AM    Name:   Shantell Walsh  MRN:     [id number]     Acct:      [id number]   Room:   304/304-01   Day:  2  Admit Date:  4/3/2024  7:09 AM    PCP:   David Joyce MD  Code Status:  DNR-CC    Subjective:     C/C:   Chief Complaint   Patient presents with    Shortness of Breath     Started yesterday      Interval History Status: significantly improved.     Seen and examined, wife at the bedside  Reports that he is feeling much better today, remains on 2 L nasal cannula will wean off as tolerated  Continue to receive IV antibiotic for pneumonia and for bacteremia  Blood culture showed Streptococcus pneumonia final culture and sensitivities still pending  Lab vital signs were reviewed  Discussed with RN at bedside    Review of Systems:     Review of Systems   Constitutional:

## 2024-04-06 LAB
ANION GAP SERPL CALCULATED.3IONS-SCNC: 10 MMOL/L (ref 9–17)
BASOPHILS # BLD: 0 K/UL (ref 0–0.2)
BASOPHILS NFR BLD: 0 % (ref 0–2)
BUN SERPL-MCNC: 18 MG/DL (ref 8–23)
CALCIUM SERPL-MCNC: 8.8 MG/DL (ref 8.6–10.4)
CHLORIDE SERPL-SCNC: 99 MMOL/L (ref 98–107)
CO2 SERPL-SCNC: 27 MMOL/L (ref 20–31)
CREAT SERPL-MCNC: 0.7 MG/DL (ref 0.7–1.2)
EOSINOPHIL # BLD: 0 K/UL (ref 0–0.4)
EOSINOPHILS RELATIVE PERCENT: 0 % (ref 1–4)
ERYTHROCYTE [DISTWIDTH] IN BLOOD BY AUTOMATED COUNT: 14.6 % (ref 12.5–15.4)
GFR SERPL CREATININE-BSD FRML MDRD: >90 ML/MIN/1.73M2
GLUCOSE BLD-MCNC: 266 MG/DL (ref 75–110)
GLUCOSE BLD-MCNC: 282 MG/DL (ref 75–110)
GLUCOSE BLD-MCNC: 288 MG/DL (ref 75–110)
GLUCOSE BLD-MCNC: 299 MG/DL (ref 75–110)
GLUCOSE SERPL-MCNC: 269 MG/DL (ref 70–99)
HCT VFR BLD AUTO: 34.6 % (ref 41–53)
HGB BLD-MCNC: 11.7 G/DL (ref 13.5–17.5)
LYMPHOCYTES NFR BLD: 0.65 K/UL (ref 1–4.8)
LYMPHOCYTES RELATIVE PERCENT: 5 % (ref 24–44)
MCH RBC QN AUTO: 27.5 PG (ref 26–34)
MCHC RBC AUTO-ENTMCNC: 33.8 G/DL (ref 31–37)
MCV RBC AUTO: 81.4 FL (ref 80–100)
MONOCYTES NFR BLD: 0.65 K/UL (ref 0.1–0.8)
MONOCYTES NFR BLD: 5 % (ref 1–7)
MORPHOLOGY: NORMAL
NEUTROPHILS NFR BLD: 90 % (ref 36–66)
NEUTS SEG NFR BLD: 11.6 K/UL (ref 1.8–7.7)
PLATELET # BLD AUTO: 415 K/UL (ref 140–450)
PMV BLD AUTO: 7.4 FL (ref 6–12)
POTASSIUM SERPL-SCNC: 3.6 MMOL/L (ref 3.7–5.3)
RBC # BLD AUTO: 4.25 M/UL (ref 4.5–5.9)
SODIUM SERPL-SCNC: 136 MMOL/L (ref 135–144)
VANCOMYCIN SERPL-MCNC: 10.5 UG/ML
WBC OTHER # BLD: 12.9 K/UL (ref 3.5–11)

## 2024-04-06 PROCEDURE — 94640 AIRWAY INHALATION TREATMENT: CPT

## 2024-04-06 PROCEDURE — 36415 COLL VENOUS BLD VENIPUNCTURE: CPT

## 2024-04-06 PROCEDURE — 6360000002 HC RX W HCPCS

## 2024-04-06 PROCEDURE — 82947 ASSAY GLUCOSE BLOOD QUANT: CPT

## 2024-04-06 PROCEDURE — 2580000003 HC RX 258: Performed by: STUDENT IN AN ORGANIZED HEALTH CARE EDUCATION/TRAINING PROGRAM

## 2024-04-06 PROCEDURE — 80048 BASIC METABOLIC PNL TOTAL CA: CPT

## 2024-04-06 PROCEDURE — 1200000000 HC SEMI PRIVATE

## 2024-04-06 PROCEDURE — 6360000002 HC RX W HCPCS: Performed by: STUDENT IN AN ORGANIZED HEALTH CARE EDUCATION/TRAINING PROGRAM

## 2024-04-06 PROCEDURE — 80202 ASSAY OF VANCOMYCIN: CPT

## 2024-04-06 PROCEDURE — 99232 SBSQ HOSP IP/OBS MODERATE 35: CPT | Performed by: STUDENT IN AN ORGANIZED HEALTH CARE EDUCATION/TRAINING PROGRAM

## 2024-04-06 PROCEDURE — 6370000000 HC RX 637 (ALT 250 FOR IP): Performed by: STUDENT IN AN ORGANIZED HEALTH CARE EDUCATION/TRAINING PROGRAM

## 2024-04-06 PROCEDURE — 94760 N-INVAS EAR/PLS OXIMETRY 1: CPT

## 2024-04-06 PROCEDURE — 87040 BLOOD CULTURE FOR BACTERIA: CPT

## 2024-04-06 PROCEDURE — 85025 COMPLETE CBC W/AUTO DIFF WBC: CPT

## 2024-04-06 PROCEDURE — 6360000002 HC RX W HCPCS: Performed by: NURSE PRACTITIONER

## 2024-04-06 PROCEDURE — 6370000000 HC RX 637 (ALT 250 FOR IP)

## 2024-04-06 RX ORDER — ALBUTEROL SULFATE 2.5 MG/3ML
2.5 SOLUTION RESPIRATORY (INHALATION)
Status: DISCONTINUED | OUTPATIENT
Start: 2024-04-06 | End: 2024-04-07 | Stop reason: HOSPADM

## 2024-04-06 RX ORDER — POTASSIUM CHLORIDE 20 MEQ/1
40 TABLET, EXTENDED RELEASE ORAL ONCE
Status: COMPLETED | OUTPATIENT
Start: 2024-04-06 | End: 2024-04-06

## 2024-04-06 RX ADMIN — PRASUGREL 10 MG: 10 TABLET, FILM COATED ORAL at 08:19

## 2024-04-06 RX ADMIN — LEVOFLOXACIN 750 MG: 750 INJECTION, SOLUTION INTRAVENOUS at 06:58

## 2024-04-06 RX ADMIN — SODIUM CHLORIDE, PRESERVATIVE FREE 10 ML: 5 INJECTION INTRAVENOUS at 20:34

## 2024-04-06 RX ADMIN — ASPIRIN 81 MG: 81 TABLET, COATED ORAL at 08:17

## 2024-04-06 RX ADMIN — INSULIN LISPRO 2 UNITS: 100 INJECTION, SOLUTION INTRAVENOUS; SUBCUTANEOUS at 08:20

## 2024-04-06 RX ADMIN — INSULIN LISPRO 2 UNITS: 100 INJECTION, SOLUTION INTRAVENOUS; SUBCUTANEOUS at 16:49

## 2024-04-06 RX ADMIN — SODIUM CHLORIDE, PRESERVATIVE FREE 10 ML: 5 INJECTION INTRAVENOUS at 08:26

## 2024-04-06 RX ADMIN — ALBUTEROL SULFATE 2.5 MG: 2.5 SOLUTION RESPIRATORY (INHALATION) at 19:49

## 2024-04-06 RX ADMIN — METHYLPREDNISOLONE SODIUM SUCCINATE 40 MG: 40 INJECTION, POWDER, FOR SOLUTION INTRAMUSCULAR; INTRAVENOUS at 16:49

## 2024-04-06 RX ADMIN — METHYLPREDNISOLONE SODIUM SUCCINATE 40 MG: 40 INJECTION, POWDER, FOR SOLUTION INTRAMUSCULAR; INTRAVENOUS at 00:53

## 2024-04-06 RX ADMIN — METOPROLOL TARTRATE 25 MG: 25 TABLET, FILM COATED ORAL at 08:16

## 2024-04-06 RX ADMIN — SODIUM CHLORIDE, PRESERVATIVE FREE 10 ML: 5 INJECTION INTRAVENOUS at 00:14

## 2024-04-06 RX ADMIN — VANCOMYCIN HYDROCHLORIDE 750 MG: 750 INJECTION, POWDER, LYOPHILIZED, FOR SOLUTION INTRAVENOUS at 04:47

## 2024-04-06 RX ADMIN — ENOXAPARIN SODIUM 40 MG: 100 INJECTION SUBCUTANEOUS at 08:19

## 2024-04-06 RX ADMIN — POTASSIUM CHLORIDE 40 MEQ: 1500 TABLET, EXTENDED RELEASE ORAL at 08:17

## 2024-04-06 RX ADMIN — INSULIN LISPRO 2 UNITS: 100 INJECTION, SOLUTION INTRAVENOUS; SUBCUTANEOUS at 12:55

## 2024-04-06 RX ADMIN — ALBUTEROL SULFATE 2.5 MG: 2.5 SOLUTION RESPIRATORY (INHALATION) at 13:20

## 2024-04-06 RX ADMIN — ROSUVASTATIN CALCIUM 40 MG: 20 TABLET, FILM COATED ORAL at 08:18

## 2024-04-06 RX ADMIN — METHYLPREDNISOLONE SODIUM SUCCINATE 40 MG: 40 INJECTION, POWDER, FOR SOLUTION INTRAMUSCULAR; INTRAVENOUS at 08:20

## 2024-04-06 RX ADMIN — ALBUTEROL SULFATE 2.5 MG: 2.5 SOLUTION RESPIRATORY (INHALATION) at 08:18

## 2024-04-06 RX ADMIN — DILTIAZEM HYDROCHLORIDE 240 MG: 120 CAPSULE, COATED, EXTENDED RELEASE ORAL at 08:18

## 2024-04-06 ASSESSMENT — PAIN SCALES - GENERAL: PAINLEVEL_OUTOF10: 0

## 2024-04-06 NOTE — PLAN OF CARE
Problem: Respiratory - Adult  Goal: Achieves optimal ventilation and oxygenation  4/5/2024 1958 by Andreina Naidu RCP  Outcome: Progressing  Flowsheets (Taken 4/5/2024 0907 by Tri Hawkins RCP)  Achieves optimal ventilation and oxygenation:   Assess for changes in respiratory status   Oxygen supplementation based on oxygen saturation or arterial blood gases   Assess and instruct to report shortness of breath or any respiratory difficulty   Respiratory therapy support as indicated  4/5/2024 1957 by Andreina Naidu RCP  RT Inhaler-Nebulizer Bronchodilator Protocol Note    There is a bronchodilator order in the chart from a provider indicating to follow the RT Bronchodilator Protocol and there is an “Initiate RT Inhaler-Nebulizer Bronchodilator Protocol” order as well (see protocol at bottom of note).    CXR Findings:  No results found.    The findings from the last RT Protocol Assessment were as follows:   History Pulmonary Disease: Chronic pulmonary disease  Respiratory Pattern: Dyspnea on exertion or RR 21-25 bpm  Breath Sounds: Intermittent or unilateral wheezes  Cough: Strong, spontaneous, non-productive  Indication for Bronchodilator Therapy: On home bronchodilators  Bronchodilator Assessment Score: 8    Will re-evaluate for freq change tomorrow due to other medications - will administer prn if needed.    Aerosolized bronchodilator medication orders have been revised according to the RT Inhaler-Nebulizer Bronchodilator Protocol below.    Respiratory Therapist to perform RT Therapy Protocol Assessment initially then follow the protocol.  Repeat RT Therapy Protocol Assessment PRN for score 0-3 or on second treatment, BID, and PRN for scores above 3.    No Indications - adjust the frequency to every 6 hours PRN wheezing or bronchospasm, if no treatments needed after 48 hours then discontinue using Per Protocol order mode.     If indication present, adjust the RT bronchodilator orders based on the

## 2024-04-06 NOTE — PLAN OF CARE
Problem: Respiratory - Adult  Goal: Achieves optimal ventilation and oxygenation  4/6/2024 0834 by Muriel Rushing RCP  Outcome: Progressing  Flowsheets (Taken 4/6/2024 0834)  Achieves optimal ventilation and oxygenation:   Assess for changes in respiratory status   Respiratory therapy support as indicated   Assess for changes in mentation and behavior   Assess and instruct to report shortness of breath or any respiratory difficulty

## 2024-04-06 NOTE — PLAN OF CARE
Problem: Respiratory - Adult  Goal: Achieves optimal ventilation and oxygenation  4/6/2024 1821 by Jesse Terry RN  Outcome: Progressing     Problem: Safety - Adult  Goal: Free from fall injury  4/6/2024 1821 by Jesse Terry RN  Outcome: Progressing  Flowsheets (Taken 4/6/2024 0730)  Free From Fall Injury: Instruct family/caregiver on patient safety     Problem: Pain  Goal: Verbalizes/displays adequate comfort level or baseline comfort level  4/6/2024 1821 by Jesse Terry RN  Outcome: Progressing     Problem: Chronic Conditions and Co-morbidities  Goal: Patient's chronic conditions and co-morbidity symptoms are monitored and maintained or improved  4/6/2024 1821 by Jesse Terry RN  Outcome: Progressing  Flowsheets (Taken 4/6/2024 0730)  Care Plan - Patient's Chronic Conditions and Co-Morbidity Symptoms are Monitored and Maintained or Improved: Monitor and assess patient's chronic conditions and comorbid symptoms for stability, deterioration, or improvement

## 2024-04-06 NOTE — PROGRESS NOTES
[facility]  Office: [phone]  Oscar Aguirre DO, Osmin Alvarez, DO, Preston Knowles DO, Geovani Ogden, DO, Dean Soto MD, Mary Medina MD, Mike Whitley MD, Vanessa Arora MD,  Yaya Chatman MD, Josee Hays MD, Bruce Ozuna MD,  Ricky Vladivia DO, Rusty Parmar MD, Bryan Ahn MD, Roosevelt Aguirre DO, Marilin Moraes MD,  Ravi Sanchez DO, Ashleigh Nieto MD, Laura Knapp MD, Yulissa Hwang MD, Jacque Pacheco MD,  Hema German MD, Ryan Garcia MD, Nicolás Bynum MD, Kimani Xavier MD, Santos Carrizales MD, Mark Murphy MD, Miguel Mccoy DO, Bryan Aj DO, Jeff Pizarro MD,  Cleve Contreras MD, Shirley Waterhouse, CNP,  Naomie Wilson CNP, Jung Ernst, CNP,  Donna Valdes, DNP, Connie Mcbride, CNP, Lexie Ochoa, CNP, Trupti Mortensen, CNP, Etta Carrera, CNP, Tasha Olivarez, PA-C, Millie Norton PA-C, Josefina Cannon, CNP, Elen Escobar, CNP, Colin Escoto, CNP, Celina Brown, CNP, Amada Martínez, CNP, Marycarmen Johnson, CNS, Julee Lewis, CNP, Chantal Hendricks CNP, Tracy Schwab, CNP         [facility]   IN-PATIENT SERVICE   [facility]    Progress Note    4/6/2024    10:32 AM    Name:   Shantell Walsh  MRN:     [id number]     Acct:      [id number]   Room:   304/304-01   Day:  3  Admit Date:  4/3/2024  7:09 AM    PCP:   David Joyce MD  Code Status:  DNR-CC    Subjective:     C/C:   Chief Complaint   Patient presents with    Shortness of Breath     Started yesterday      Interval History Status: significantly improved.     Patient was seen and examined today  He was seen resting on his bed denies any complaint  Remains on room air  Continues to report that he is feeling much better  Final blood culture came back with Streptococcus pneumonia sensitive to levofloxacin, patient already on levofloxacin for pneumonia so we will discontinue vancomycin, ID was consulted for further recommendation before discharge  Lab vital signs were

## 2024-04-06 NOTE — PLAN OF CARE
Problem: Discharge Planning  Goal: Discharge to home or other facility with appropriate resources  Outcome: Progressing     Problem: Respiratory - Adult  Goal: Achieves optimal ventilation and oxygenation  4/6/2024 0455 by Rosana Ireland RN  Outcome: Progressing  Flowsheets (Taken 4/5/2024 2000)  Achieves optimal ventilation and oxygenation:   Assess for changes in respiratory status   Assess for changes in mentation and behavior   Position to facilitate oxygenation and minimize respiratory effort     Problem: Safety - Adult  Goal: Free from fall injury  Outcome: Progressing  Flowsheets (Taken 4/5/2024 2000)  Free From Fall Injury:   Instruct family/caregiver on patient safety   Based on caregiver fall risk screen, instruct family/caregiver to ask for assistance with transferring infant if caregiver noted to have fall risk factors     Problem: Pain  Goal: Verbalizes/displays adequate comfort level or baseline comfort level  Outcome: Progressing     Problem: Chronic Conditions and Co-morbidities  Goal: Patient's chronic conditions and co-morbidity symptoms are monitored and maintained or improved  Outcome: Progressing  Flowsheets (Taken 4/5/2024 2000)  Care Plan - Patient's Chronic Conditions and Co-Morbidity Symptoms are Monitored and Maintained or Improved: Monitor and assess patient's chronic conditions and comorbid symptoms for stability, deterioration, or improvement     Problem: ABCDS Injury Assessment  Goal: Absence of physical injury  Outcome: Progressing  Flowsheets (Taken 4/5/2024 2000)  Absence of Physical Injury: Implement safety measures based on patient assessment

## 2024-04-06 NOTE — PLAN OF CARE
Problem: Respiratory - Adult  Goal: Achieves optimal ventilation and oxygenation  4/6/2024 1955 by Jacquelyn Monk RCP  Flowsheets (Taken 4/6/2024 1955)  Achieves optimal ventilation and oxygenation:   Assess for changes in respiratory status   Position to facilitate oxygenation and minimize respiratory effort   Respiratory therapy support as indicated   Assess for changes in mentation and behavior   Encourage broncho-pulmonary hygiene including cough, deep breathe, incentive spirometry   Assess and instruct to report shortness of breath or any respiratory difficulty

## 2024-04-07 VITALS
HEIGHT: 70 IN | DIASTOLIC BLOOD PRESSURE: 88 MMHG | SYSTOLIC BLOOD PRESSURE: 140 MMHG | OXYGEN SATURATION: 100 % | TEMPERATURE: 98.8 F | HEART RATE: 77 BPM | WEIGHT: 184.3 LBS | RESPIRATION RATE: 18 BRPM | BODY MASS INDEX: 26.39 KG/M2

## 2024-04-07 LAB
ANION GAP SERPL CALCULATED.3IONS-SCNC: 11 MMOL/L (ref 9–17)
BUN SERPL-MCNC: 20 MG/DL (ref 8–23)
CALCIUM SERPL-MCNC: 9 MG/DL (ref 8.6–10.4)
CHLORIDE SERPL-SCNC: 99 MMOL/L (ref 98–107)
CO2 SERPL-SCNC: 26 MMOL/L (ref 20–31)
CREAT SERPL-MCNC: 0.8 MG/DL (ref 0.7–1.2)
ERYTHROCYTE [DISTWIDTH] IN BLOOD BY AUTOMATED COUNT: 14.3 % (ref 12.5–15.4)
GFR SERPL CREATININE-BSD FRML MDRD: >90 ML/MIN/1.73M2
GLUCOSE BLD-MCNC: 342 MG/DL (ref 75–110)
GLUCOSE SERPL-MCNC: 344 MG/DL (ref 70–99)
HCT VFR BLD AUTO: 36.1 % (ref 41–53)
HGB BLD-MCNC: 12.2 G/DL (ref 13.5–17.5)
MCH RBC QN AUTO: 27.7 PG (ref 26–34)
MCHC RBC AUTO-ENTMCNC: 33.7 G/DL (ref 31–37)
MCV RBC AUTO: 82.2 FL (ref 80–100)
PLATELET # BLD AUTO: 432 K/UL (ref 140–450)
PMV BLD AUTO: 7.2 FL (ref 6–12)
POTASSIUM SERPL-SCNC: 4.1 MMOL/L (ref 3.7–5.3)
RBC # BLD AUTO: 4.4 M/UL (ref 4.5–5.9)
SODIUM SERPL-SCNC: 136 MMOL/L (ref 135–144)
WBC OTHER # BLD: 13.7 K/UL (ref 3.5–11)

## 2024-04-07 PROCEDURE — 82947 ASSAY GLUCOSE BLOOD QUANT: CPT

## 2024-04-07 PROCEDURE — 6360000002 HC RX W HCPCS

## 2024-04-07 PROCEDURE — 6370000000 HC RX 637 (ALT 250 FOR IP): Performed by: STUDENT IN AN ORGANIZED HEALTH CARE EDUCATION/TRAINING PROGRAM

## 2024-04-07 PROCEDURE — 6360000002 HC RX W HCPCS: Performed by: STUDENT IN AN ORGANIZED HEALTH CARE EDUCATION/TRAINING PROGRAM

## 2024-04-07 PROCEDURE — 36415 COLL VENOUS BLD VENIPUNCTURE: CPT

## 2024-04-07 PROCEDURE — 2580000003 HC RX 258: Performed by: STUDENT IN AN ORGANIZED HEALTH CARE EDUCATION/TRAINING PROGRAM

## 2024-04-07 PROCEDURE — 94640 AIRWAY INHALATION TREATMENT: CPT

## 2024-04-07 PROCEDURE — 85027 COMPLETE CBC AUTOMATED: CPT

## 2024-04-07 PROCEDURE — 99223 1ST HOSP IP/OBS HIGH 75: CPT | Performed by: INTERNAL MEDICINE

## 2024-04-07 PROCEDURE — 80048 BASIC METABOLIC PNL TOTAL CA: CPT

## 2024-04-07 PROCEDURE — 99239 HOSP IP/OBS DSCHRG MGMT >30: CPT | Performed by: STUDENT IN AN ORGANIZED HEALTH CARE EDUCATION/TRAINING PROGRAM

## 2024-04-07 PROCEDURE — 6370000000 HC RX 637 (ALT 250 FOR IP)

## 2024-04-07 PROCEDURE — 94760 N-INVAS EAR/PLS OXIMETRY 1: CPT

## 2024-04-07 RX ORDER — ALBUTEROL SULFATE 90 UG/1
2 AEROSOL, METERED RESPIRATORY (INHALATION) 4 TIMES DAILY PRN
Qty: 18 G | Refills: 0 | Status: SHIPPED | OUTPATIENT
Start: 2024-04-07

## 2024-04-07 RX ORDER — LEVOFLOXACIN 750 MG/1
750 TABLET, FILM COATED ORAL DAILY
Qty: 11 TABLET | Refills: 0 | Status: SHIPPED | OUTPATIENT
Start: 2024-04-07 | End: 2024-04-18

## 2024-04-07 RX ORDER — PREDNISONE 50 MG/1
50 TABLET ORAL DAILY
Qty: 5 TABLET | Refills: 0 | Status: SHIPPED | OUTPATIENT
Start: 2024-04-07 | End: 2024-04-12

## 2024-04-07 RX ADMIN — DILTIAZEM HYDROCHLORIDE 240 MG: 120 CAPSULE, COATED, EXTENDED RELEASE ORAL at 08:43

## 2024-04-07 RX ADMIN — SODIUM CHLORIDE, PRESERVATIVE FREE 10 ML: 5 INJECTION INTRAVENOUS at 08:43

## 2024-04-07 RX ADMIN — METOPROLOL TARTRATE 25 MG: 25 TABLET, FILM COATED ORAL at 08:43

## 2024-04-07 RX ADMIN — ROSUVASTATIN CALCIUM 40 MG: 20 TABLET, FILM COATED ORAL at 08:43

## 2024-04-07 RX ADMIN — ENOXAPARIN SODIUM 40 MG: 100 INJECTION SUBCUTANEOUS at 08:42

## 2024-04-07 RX ADMIN — LEVOFLOXACIN 750 MG: 750 INJECTION, SOLUTION INTRAVENOUS at 07:07

## 2024-04-07 RX ADMIN — PRASUGREL 10 MG: 10 TABLET, FILM COATED ORAL at 08:43

## 2024-04-07 RX ADMIN — METHYLPREDNISOLONE SODIUM SUCCINATE 40 MG: 40 INJECTION, POWDER, FOR SOLUTION INTRAMUSCULAR; INTRAVENOUS at 00:07

## 2024-04-07 RX ADMIN — METHYLPREDNISOLONE SODIUM SUCCINATE 40 MG: 40 INJECTION, POWDER, FOR SOLUTION INTRAMUSCULAR; INTRAVENOUS at 08:43

## 2024-04-07 RX ADMIN — INSULIN LISPRO 3 UNITS: 100 INJECTION, SOLUTION INTRAVENOUS; SUBCUTANEOUS at 08:42

## 2024-04-07 RX ADMIN — ALBUTEROL SULFATE 2.5 MG: 2.5 SOLUTION RESPIRATORY (INHALATION) at 07:55

## 2024-04-07 RX ADMIN — ASPIRIN 81 MG: 81 TABLET, COATED ORAL at 08:43

## 2024-04-07 NOTE — DISCHARGE SUMMARY
23 02/17/2017 06:59 AM    CALCIUM 9.0 04/07/2024 08:42 AM    LABGLOM >90 04/07/2024 08:42 AM    GFRAA >60 02/17/2017 06:59 AM    GFR      02/17/2017 06:59 AM    GFR NOT REPORTED 02/17/2017 06:59 AM       Radiology:  XR CHEST PORTABLE    Result Date: 4/3/2024  Bibasilar airspace disease with focal consolidation right lower lobe suggesting multifocal pneumonia.  Recommend follow-up to resolution. Background emphysema.       Consultations:    Consults:     Final Specialist Recommendations/Findings:   PHARMACY TO DOSE VANCOMYCIN  IP CONSULT TO INFECTIOUS DISEASES      The patient was seen and examined on day of discharge and this discharge summary is in conjunction with any daily progress note from day of discharge.    Discharge plan:     Disposition: Home    Physician Follow Up:     David Joyce MD  [address] [address]  [phone]    Follow up         Requiring Further Evaluation/Follow Up POST HOSPITALIZATION/Incidental Findings: BMP,CBC    Diet: regular diet    Activity: As tolerated    Instructions to Patient: Please continue to take your medication as prescribed and follow-up with your primary doctor to recheck your CBC and BMP within 7 to 10 days of discharge     Discharge Medications:      Medication List        START taking these medications      albuterol sulfate  (90 Base) MCG/ACT inhaler  Commonly known as: Ventolin HFA  Inhale 2 puffs into the lungs 4 times daily as needed for Wheezing     levoFLOXacin 750 MG tablet  Commonly known as: LEVAQUIN  Take 1 tablet by mouth daily for 11 days     predniSONE 50 MG tablet  Commonly known as: DELTASONE  Take 1 tablet by mouth daily for 5 days            CONTINUE taking these medications      aspirin 81 MG tablet     dilTIAZem 240 MG extended release capsule  Commonly known as: CARDIZEM CD     metFORMIN 500 MG tablet  Commonly known as: GLUCOPHAGE     metoprolol tartrate 25 MG tablet  Commonly known as: LOPRESSOR     prasugrel 10 MG

## 2024-04-07 NOTE — DISCHARGE INSTR - DIET

## 2024-04-07 NOTE — DISCHARGE INSTRUCTIONS
Please continue to take your medication as prescribed and follow-up with your primary doctor to recheck your CBC and BMP within 7 to 10 days of discharge

## 2024-04-07 NOTE — PLAN OF CARE
Problem: Respiratory - Adult  Goal: Achieves optimal ventilation and oxygenation  4/7/2024 0800 by Muriel Rushing RCP  Outcome: Progressing  Flowsheets (Taken 4/7/2024 0800)  Achieves optimal ventilation and oxygenation:   Assess for changes in respiratory status   Respiratory therapy support as indicated   Assess for changes in mentation and behavior   Assess and instruct to report shortness of breath or any respiratory difficulty

## 2024-04-07 NOTE — PLAN OF CARE
Problem: Discharge Planning  Goal: Discharge to home or other facility with appropriate resources  4/7/2024 0424 by Rosana Ireland RN  Outcome: Progressing  Flowsheets (Taken 4/6/2024 2000)  Discharge to home or other facility with appropriate resources: Identify barriers to discharge with patient and caregiver     Problem: Respiratory - Adult  Goal: Achieves optimal ventilation and oxygenation  4/7/2024 0424 by Rosana Ireland RN  Outcome: Progressing  Flowsheets (Taken 4/6/2024 2000)  Achieves optimal ventilation and oxygenation:   Assess for changes in respiratory status   Assess for changes in mentation and behavior     Problem: Safety - Adult  Goal: Free from fall injury  4/7/2024 0424 by Rosana Ireland RN  Outcome: Progressing  Flowsheets (Taken 4/6/2024 2000)  Free From Fall Injury:   Instruct family/caregiver on patient safety   Based on caregiver fall risk screen, instruct family/caregiver to ask for assistance with transferring infant if caregiver noted to have fall risk factors     Problem: Pain  Goal: Verbalizes/displays adequate comfort level or baseline comfort level  4/7/2024 0424 by Rosana Ireland RN  Outcome: Progressing     Problem: Chronic Conditions and Co-morbidities  Goal: Patient's chronic conditions and co-morbidity symptoms are monitored and maintained or improved  4/7/2024 0424 by Rosana Ireland RN  Outcome: Progressing  Flowsheets (Taken 4/6/2024 2000)  Care Plan - Patient's Chronic Conditions and Co-Morbidity Symptoms are Monitored and Maintained or Improved: Monitor and assess patient's chronic conditions and comorbid symptoms for stability, deterioration, or improvement     Problem: ABCDS Injury Assessment  Goal: Absence of physical injury  4/7/2024 0424 by Rosana Ireland RN  Outcome: Progressing  Flowsheets (Taken 4/6/2024 2000)  Absence of Physical Injury: Implement safety measures based on patient assessment

## 2024-04-07 NOTE — PROGRESS NOTES
Pt alert, oriented and medically stable for discharge. Pt items sent home with patient including cell phone, , clothing, paperwork. Reviewed with patient and family discharge instructions, new medications and their side effects as well as follow up appointments. Pt to be transported via wheelchair to main entrance. IV removed.    18

## 2024-04-07 NOTE — CONSULTS
and sound a like substitutions which may escape proof reading.  In such instances, actual meaning can be extrapolated by contextual diversion.

## 2024-04-08 LAB
MICROORGANISM SPEC CULT: NORMAL
SERVICE CMNT-IMP: NORMAL
SPECIMEN DESCRIPTION: NORMAL

## 2024-04-11 LAB
MICROORGANISM SPEC CULT: NORMAL
MICROORGANISM SPEC CULT: NORMAL
SERVICE CMNT-IMP: NORMAL
SERVICE CMNT-IMP: NORMAL
SPECIMEN DESCRIPTION: NORMAL
SPECIMEN DESCRIPTION: NORMAL

## 2024-04-11 NOTE — CARE COORDINATION
----- Message from Bebeto Hooper sent at 4/11/2024  8:54 AM EDT -----  Subject: Referral Request    Reason for referral request? Annual Bloodwork, BMP   Provider patient wants to be referred to(if known):     Provider Phone Number(if known):    Additional Information for Provider?   ---------------------------------------------------------------------------  --------------  CALL BACK INFO    [phone]; OK to leave message on voicemail  ---------------------------------------------------------------------------  --------------   Patient denies any needs if he is discharged today.  IMM signed.

## 2024-04-15 ENCOUNTER — HOSPITAL ENCOUNTER (OUTPATIENT)
Dept: CT IMAGING | Age: 69
Discharge: HOME | End: 2024-04-17
Attending: INTERNAL MEDICINE
Payer: MEDICARE

## 2024-04-15 DIAGNOSIS — R91.8 LUNG NODULES: ICD-10-CM

## 2024-04-15 PROCEDURE — 71250 CT THORAX DX C-: CPT

## 2024-04-23 ENCOUNTER — OFFICE VISIT (OUTPATIENT)
Dept: PULMONOLOGY | Age: 69
End: 2024-04-23
Payer: MEDICARE

## 2024-04-23 VITALS
WEIGHT: 176 LBS | DIASTOLIC BLOOD PRESSURE: 84 MMHG | HEIGHT: 70 IN | HEART RATE: 66 BPM | BODY MASS INDEX: 25.2 KG/M2 | RESPIRATION RATE: 16 BRPM | OXYGEN SATURATION: 97 % | SYSTOLIC BLOOD PRESSURE: 120 MMHG

## 2024-04-23 DIAGNOSIS — R91.8 LUNG NODULES: Primary | ICD-10-CM

## 2024-04-23 DIAGNOSIS — Z87.891 PERSONAL HISTORY OF TOBACCO USE: ICD-10-CM

## 2024-04-23 DIAGNOSIS — Z87.891 HISTORY OF SMOKING AT LEAST 1 PACK PER DAY FOR AT LEAST 30 YEARS: ICD-10-CM

## 2024-04-23 DIAGNOSIS — J43.2 CENTRILOBULAR EMPHYSEMA (HCC): ICD-10-CM

## 2024-04-23 DIAGNOSIS — Z87.01 HISTORY OF BACTERIAL PNEUMONIA: ICD-10-CM

## 2024-04-23 PROCEDURE — G8417 CALC BMI ABV UP PARAM F/U: HCPCS | Performed by: INTERNAL MEDICINE

## 2024-04-23 PROCEDURE — 3079F DIAST BP 80-89 MM HG: CPT | Performed by: INTERNAL MEDICINE

## 2024-04-23 PROCEDURE — G8427 DOCREV CUR MEDS BY ELIG CLIN: HCPCS | Performed by: INTERNAL MEDICINE

## 2024-04-23 PROCEDURE — 1123F ACP DISCUSS/DSCN MKR DOCD: CPT | Performed by: INTERNAL MEDICINE

## 2024-04-23 PROCEDURE — 1111F DSCHRG MED/CURRENT MED MERGE: CPT | Performed by: INTERNAL MEDICINE

## 2024-04-23 PROCEDURE — 3017F COLORECTAL CA SCREEN DOC REV: CPT | Performed by: INTERNAL MEDICINE

## 2024-04-23 PROCEDURE — 3023F SPIROM DOC REV: CPT | Performed by: INTERNAL MEDICINE

## 2024-04-23 PROCEDURE — 1036F TOBACCO NON-USER: CPT | Performed by: INTERNAL MEDICINE

## 2024-04-23 PROCEDURE — 99214 OFFICE O/P EST MOD 30 MIN: CPT | Performed by: INTERNAL MEDICINE

## 2024-04-23 PROCEDURE — 3074F SYST BP LT 130 MM HG: CPT | Performed by: INTERNAL MEDICINE

## 2024-04-23 RX ORDER — FLUTICASONE FUROATE, UMECLIDINIUM BROMIDE AND VILANTEROL TRIFENATATE 100; 62.5; 25 UG/1; UG/1; UG/1
1 POWDER RESPIRATORY (INHALATION) DAILY
Qty: 90 EACH | Refills: 3 | Status: SHIPPED | OUTPATIENT
Start: 2024-04-23

## 2024-04-23 NOTE — PROGRESS NOTES
day for at least 30 years  Z87.891       4. Personal history of tobacco use  Z87.891       5. History of bacterial pneumonia  Z87.01               Assessment and discussion    CT scan of the chest done/low-dose CT scan on 04/15/2024 the lingular 5 mm nodule which was seen on CT scan on 12/18/2023 was not seen anymore bilateral emphysematous changes were seen.  No consolidation or infiltrate was seen on the CT scan      On 01/16/2024 office visit he had a 6-minute walk test done he was able to walk 750 feet and after that his saturation dropped to 88% he was placed on nasal cannula.  He did not want to use home oxygen  He also has pulmonary function test done in the office on 01/16/2024 which showed FEV1 of 36% airway trapping with residual volume of around 150% and severe reduction in diffusion capacity which is 31% as compared to function test in 2019 when he has FEV1 of 38% he did have airway trapping at that time and diffusion capacity was 41%.      In the past he was sent to [facility] to evaluate for lung volume reduction surgery but they do not think that he will benefit from lung volume reduction surgery.      Plan:    Recent hospitalization H&P discharge summary chest x-ray reviewed.  CT scan of the chest from 04/15/2024 images reviewed.  Lingular nodule is not seen anymore on CT scan.  He does not want to use home oxygen at 6-minute walk test shows exercise-induced hypoxia discussed the risk of worsening hypoxia and the risk of persistent hypoxia he understands does not want to use oxygen at this time.    Trelegy once daily to continue.  Prescription written for Trelegy  Albuterol as needed  Flonase nasal spray to continue as needed  Had both the doses of covid vaccine and boosters did not have last booster  Refused flu vaccine  Annual flu vaccine recommended  I discussed with him about getting pneumonia vaccine Prevnar 20 in the next few weeks  Previously he is reluctant to take the vaccines  He had

## 2024-09-05 ENCOUNTER — OFFICE VISIT (OUTPATIENT)
Dept: PODIATRY | Age: 69
End: 2024-09-05
Payer: MEDICARE

## 2024-09-05 VITALS — WEIGHT: 180 LBS | HEIGHT: 69 IN | BODY MASS INDEX: 26.66 KG/M2

## 2024-09-05 DIAGNOSIS — M77.41 METATARSALGIA OF BOTH FEET: Primary | ICD-10-CM

## 2024-09-05 DIAGNOSIS — M79.604 PAIN OF RIGHT LOWER EXTREMITY: ICD-10-CM

## 2024-09-05 DIAGNOSIS — M77.42 METATARSALGIA OF BOTH FEET: Primary | ICD-10-CM

## 2024-09-05 PROCEDURE — 99203 OFFICE O/P NEW LOW 30 MIN: CPT | Performed by: PODIATRIST

## 2024-09-05 PROCEDURE — G8417 CALC BMI ABV UP PARAM F/U: HCPCS | Performed by: PODIATRIST

## 2024-09-05 PROCEDURE — 1123F ACP DISCUSS/DSCN MKR DOCD: CPT | Performed by: PODIATRIST

## 2024-09-05 PROCEDURE — G8427 DOCREV CUR MEDS BY ELIG CLIN: HCPCS | Performed by: PODIATRIST

## 2024-09-05 PROCEDURE — 1036F TOBACCO NON-USER: CPT | Performed by: PODIATRIST

## 2024-09-05 PROCEDURE — 3017F COLORECTAL CA SCREEN DOC REV: CPT | Performed by: PODIATRIST

## 2024-10-29 ENCOUNTER — OFFICE VISIT (OUTPATIENT)
Dept: PULMONOLOGY | Age: 69
End: 2024-10-29
Payer: MEDICARE

## 2024-10-29 VITALS
RESPIRATION RATE: 16 BRPM | DIASTOLIC BLOOD PRESSURE: 75 MMHG | WEIGHT: 178 LBS | SYSTOLIC BLOOD PRESSURE: 113 MMHG | HEART RATE: 51 BPM | HEIGHT: 69 IN | OXYGEN SATURATION: 98 % | BODY MASS INDEX: 26.36 KG/M2

## 2024-10-29 DIAGNOSIS — Z87.891 HISTORY OF SMOKING AT LEAST 1 PACK PER DAY FOR AT LEAST 30 YEARS: ICD-10-CM

## 2024-10-29 DIAGNOSIS — J43.2 CENTRILOBULAR EMPHYSEMA (HCC): ICD-10-CM

## 2024-10-29 DIAGNOSIS — R91.8 LUNG NODULES: Primary | ICD-10-CM

## 2024-10-29 DIAGNOSIS — Z87.891 PERSONAL HISTORY OF TOBACCO USE: ICD-10-CM

## 2024-10-29 PROCEDURE — G8417 CALC BMI ABV UP PARAM F/U: HCPCS | Performed by: INTERNAL MEDICINE

## 2024-10-29 PROCEDURE — 3023F SPIROM DOC REV: CPT | Performed by: INTERNAL MEDICINE

## 2024-10-29 PROCEDURE — G0009 ADMIN PNEUMOCOCCAL VACCINE: HCPCS | Performed by: INTERNAL MEDICINE

## 2024-10-29 PROCEDURE — 90677 PCV20 VACCINE IM: CPT | Performed by: INTERNAL MEDICINE

## 2024-10-29 PROCEDURE — 3074F SYST BP LT 130 MM HG: CPT | Performed by: INTERNAL MEDICINE

## 2024-10-29 PROCEDURE — 3078F DIAST BP <80 MM HG: CPT | Performed by: INTERNAL MEDICINE

## 2024-10-29 PROCEDURE — 99214 OFFICE O/P EST MOD 30 MIN: CPT | Performed by: INTERNAL MEDICINE

## 2024-10-29 PROCEDURE — 1123F ACP DISCUSS/DSCN MKR DOCD: CPT | Performed by: INTERNAL MEDICINE

## 2024-10-29 PROCEDURE — G8427 DOCREV CUR MEDS BY ELIG CLIN: HCPCS | Performed by: INTERNAL MEDICINE

## 2024-10-29 PROCEDURE — 1036F TOBACCO NON-USER: CPT | Performed by: INTERNAL MEDICINE

## 2024-10-29 PROCEDURE — G0296 VISIT TO DETERM LDCT ELIG: HCPCS | Performed by: INTERNAL MEDICINE

## 2024-10-29 PROCEDURE — G8484 FLU IMMUNIZE NO ADMIN: HCPCS | Performed by: INTERNAL MEDICINE

## 2024-10-29 PROCEDURE — 1159F MED LIST DOCD IN RCRD: CPT | Performed by: INTERNAL MEDICINE

## 2024-10-29 PROCEDURE — 3017F COLORECTAL CA SCREEN DOC REV: CPT | Performed by: INTERNAL MEDICINE

## 2024-10-29 NOTE — PROGRESS NOTES
smokeless tobacco.. Discussed with patient the risks and benefits of screening, including over-diagnosis, false positive rate, and total radiation exposure.  The patient currently exhibits no signs or symptoms suggestive of lung cancer.  Discussed with patient the importance of compliance with yearly annual lung cancer screenings and willingness to undergo diagnosis and treatment if screening scan is positive.  In addition, the patient was counseled regarding the importance of remaining smoke free and/or total smoking cessation.    Also reviewed the following if the patient has Medicare that as of February 10, 2022, Medicare only covers LDCT screening in patients aged 50-77 with at least a 20 pack-year smoking history who currently smoke or have quit in the last 15 years

## 2025-04-14 ENCOUNTER — HOSPITAL ENCOUNTER (OUTPATIENT)
Dept: CT IMAGING | Age: 70
Discharge: HOME | End: 2025-04-16
Attending: INTERNAL MEDICINE
Payer: MEDICARE

## 2025-04-14 DIAGNOSIS — Z87.891 PERSONAL HISTORY OF TOBACCO USE: ICD-10-CM

## 2025-04-14 PROCEDURE — 71271 CT THORAX LUNG CANCER SCR C-: CPT

## 2025-04-15 ENCOUNTER — RESULTS FOLLOW-UP (OUTPATIENT)
Dept: PULMONOLOGY | Age: 70
End: 2025-04-15

## 2025-04-29 ENCOUNTER — OFFICE VISIT (OUTPATIENT)
Dept: PULMONOLOGY | Age: 70
End: 2025-04-29
Payer: MEDICARE

## 2025-04-29 VITALS
WEIGHT: 184.8 LBS | RESPIRATION RATE: 18 BRPM | DIASTOLIC BLOOD PRESSURE: 73 MMHG | SYSTOLIC BLOOD PRESSURE: 101 MMHG | HEART RATE: 50 BPM | BODY MASS INDEX: 27.37 KG/M2 | HEIGHT: 69 IN | OXYGEN SATURATION: 98 %

## 2025-04-29 DIAGNOSIS — R91.8 LUNG NODULES: Primary | ICD-10-CM

## 2025-04-29 DIAGNOSIS — Z87.891 HISTORY OF SMOKING AT LEAST 1 PACK PER DAY FOR AT LEAST 30 YEARS: ICD-10-CM

## 2025-04-29 DIAGNOSIS — J43.2 CENTRILOBULAR EMPHYSEMA (HCC): ICD-10-CM

## 2025-04-29 PROCEDURE — 1036F TOBACCO NON-USER: CPT | Performed by: INTERNAL MEDICINE

## 2025-04-29 PROCEDURE — 1159F MED LIST DOCD IN RCRD: CPT | Performed by: INTERNAL MEDICINE

## 2025-04-29 PROCEDURE — 1123F ACP DISCUSS/DSCN MKR DOCD: CPT | Performed by: INTERNAL MEDICINE

## 2025-04-29 PROCEDURE — G8417 CALC BMI ABV UP PARAM F/U: HCPCS | Performed by: INTERNAL MEDICINE

## 2025-04-29 PROCEDURE — G8427 DOCREV CUR MEDS BY ELIG CLIN: HCPCS | Performed by: INTERNAL MEDICINE

## 2025-04-29 PROCEDURE — 3078F DIAST BP <80 MM HG: CPT | Performed by: INTERNAL MEDICINE

## 2025-04-29 PROCEDURE — 3074F SYST BP LT 130 MM HG: CPT | Performed by: INTERNAL MEDICINE

## 2025-04-29 PROCEDURE — 99214 OFFICE O/P EST MOD 30 MIN: CPT | Performed by: INTERNAL MEDICINE

## 2025-04-29 PROCEDURE — 3017F COLORECTAL CA SCREEN DOC REV: CPT | Performed by: INTERNAL MEDICINE

## 2025-04-29 PROCEDURE — 3023F SPIROM DOC REV: CPT | Performed by: INTERNAL MEDICINE

## 2025-04-29 RX ORDER — FLUTICASONE FUROATE, UMECLIDINIUM BROMIDE AND VILANTEROL TRIFENATATE 100; 62.5; 25 UG/1; UG/1; UG/1
1 POWDER RESPIRATORY (INHALATION) DAILY
Qty: 90 EACH | Refills: 3 | Status: SHIPPED | OUTPATIENT
Start: 2025-04-29

## 2025-04-29 NOTE — PROGRESS NOTES
History:        Diagnosis Date    CAD (coronary artery disease) 2011    stents    Centrilobular emphysema (HCC)     COPD (chronic obstructive pulmonary disease) (HCC)     COPD exacerbation (HCC) 02/15/2017    COVID     Emphysema of lung (HCC)     Examination of participant in clinical trial 05/20/2013    6 year follow up    Hyperlipidemia     Hypertension     Hypokalemia     Lung nodule     Oral thrush 11/03/2016    Thrush     Tongue irritation 11/03/2016       Past Surgical History:        Procedure Laterality Date    CARDIAC CATHETERIZATION  2011, 2013    COLONOSCOPY      CORONARY ANGIOPLASTY WITH STENT PLACEMENT  01/01/2011    x2     CORONARY ANGIOPLASTY WITH STENT PLACEMENT  05/01/2013    x1    HERNIA REPAIR      NOSE SURGERY Left 3/5/2024    LEFT NASAL ALA SEBACEOUS LESION BIOPSY EXCISION WITH NASOLABIAL FLAP CLOSURE  AND FROZEN SECTION * PATHOLOGY REQUIRED performed by Hector Steele MD at [facility] OR       Allergies:    Allergies   Allergen Reactions    Pcn [Penicillins]          Home Meds:   Outpatient Encounter Medications as of 4/29/2025   Medication Sig Dispense Refill    dilTIAZem (CARDIZEM CD) 240 MG extended release capsule TAKE 1 CAPSULE BY MOUTH EVERY DAY 90 capsule 1    rosuvastatin (CRESTOR) 40 MG tablet Take 1 tablet by mouth daily 90 tablet 1    metoprolol tartrate (LOPRESSOR) 25 MG tablet TAKE 1 TABLET BY MOUTH TWICE A  tablet 3    prasugrel (EFFIENT) 10 MG TABS TAKE 1 TABLET BY MOUTH EVERY DAY 90 tablet 3    fluticasone-umeclidin-vilant (TRELEGY ELLIPTA) 100-62.5-25 MCG/ACT AEPB inhaler Inhale 1 puff into the lungs daily 90 each 3    albuterol sulfate HFA (VENTOLIN HFA) 108 (90 Base) MCG/ACT inhaler Inhale 2 puffs into the lungs 4 times daily as needed for Wheezing 18 g 0    metFORMIN (GLUCOPHAGE) 500 MG tablet Take 1 tablet by mouth 2 times daily (with meals)      aspirin 81 MG tablet Take 1 tablet by mouth daily       No facility-administered encounter medications on

## 2025-07-10 ENCOUNTER — HOSPITAL ENCOUNTER (OUTPATIENT)
Age: 70
Discharge: HOME | End: 2025-07-10
Payer: MEDICARE

## 2025-07-10 DIAGNOSIS — I25.118 CORONARY ARTERY DISEASE OF NATIVE ARTERY OF NATIVE HEART WITH STABLE ANGINA PECTORIS: ICD-10-CM

## 2025-07-10 LAB
AST SERPL-CCNC: 17 U/L (ref 10–50)
CHOLEST SERPL-MCNC: 149 MG/DL (ref 0–199)
CHOLESTEROL/HDL RATIO: 2.5
CK SERPL-CCNC: 63 U/L (ref 39–308)
HDLC SERPL-MCNC: 59 MG/DL
LDLC SERPL CALC-MCNC: 74 MG/DL (ref 0–100)
TRIGL SERPL-MCNC: 78 MG/DL
VLDLC SERPL CALC-MCNC: 16 MG/DL (ref 1–30)

## 2025-07-10 PROCEDURE — 80061 LIPID PANEL: CPT

## 2025-07-10 PROCEDURE — 82550 ASSAY OF CK (CPK): CPT

## 2025-07-10 PROCEDURE — 36415 COLL VENOUS BLD VENIPUNCTURE: CPT

## 2025-07-10 PROCEDURE — 84450 TRANSFERASE (AST) (SGOT): CPT

## 2025-08-04 ENCOUNTER — HOSPITAL ENCOUNTER (OUTPATIENT)
Age: 70
Discharge: HOME | End: 2025-08-04
Payer: MEDICARE

## 2025-08-04 DIAGNOSIS — I25.118 CORONARY ARTERY DISEASE OF NATIVE ARTERY OF NATIVE HEART WITH STABLE ANGINA PECTORIS: ICD-10-CM

## 2025-08-04 DIAGNOSIS — R94.39 ABNORMAL CARDIOVASCULAR STRESS TEST: ICD-10-CM

## 2025-08-04 LAB
ANION GAP SERPL CALCULATED.3IONS-SCNC: 12 MMOL/L (ref 9–16)
BUN SERPL-MCNC: 16 MG/DL (ref 8–23)
CALCIUM SERPL-MCNC: 9.4 MG/DL (ref 8.6–10.4)
CHLORIDE SERPL-SCNC: 100 MMOL/L (ref 98–107)
CO2 SERPL-SCNC: 26 MMOL/L (ref 20–31)
CREAT SERPL-MCNC: 1.2 MG/DL (ref 0.7–1.2)
ERYTHROCYTE [DISTWIDTH] IN BLOOD BY AUTOMATED COUNT: 13.3 % (ref 11.8–14.4)
GFR, ESTIMATED: 66 ML/MIN/1.73M2
GLUCOSE SERPL-MCNC: 141 MG/DL (ref 74–99)
HCT VFR BLD AUTO: 39.7 % (ref 40.7–50.3)
HGB BLD-MCNC: 12.6 G/DL (ref 13–17)
MCH RBC QN AUTO: 27.6 PG (ref 25.2–33.5)
MCHC RBC AUTO-ENTMCNC: 31.7 G/DL (ref 28.4–34.8)
MCV RBC AUTO: 87.1 FL (ref 82.6–102.9)
NRBC BLD-RTO: 0 PER 100 WBC
PLATELET # BLD AUTO: 321 K/UL (ref 138–453)
PMV BLD AUTO: 10 FL (ref 8.1–13.5)
POTASSIUM SERPL-SCNC: 4.2 MMOL/L (ref 3.7–5.3)
RBC # BLD AUTO: 4.56 M/UL (ref 4.21–5.77)
SODIUM SERPL-SCNC: 138 MMOL/L (ref 136–145)
WBC OTHER # BLD: 9.9 K/UL (ref 3.5–11.3)

## 2025-08-04 PROCEDURE — 36415 COLL VENOUS BLD VENIPUNCTURE: CPT

## 2025-08-04 PROCEDURE — 80048 BASIC METABOLIC PNL TOTAL CA: CPT

## 2025-08-04 PROCEDURE — 85027 COMPLETE CBC AUTOMATED: CPT

## 2025-08-27 ENCOUNTER — HOSPITAL ENCOUNTER (OUTPATIENT)
Age: 70
Setting detail: OUTPATIENT SURGERY
Discharge: HOME | End: 2025-08-27
Attending: INTERNAL MEDICINE | Admitting: INTERNAL MEDICINE
Payer: MEDICARE

## 2025-08-27 VITALS
SYSTOLIC BLOOD PRESSURE: 101 MMHG | OXYGEN SATURATION: 96 % | HEART RATE: 49 BPM | RESPIRATION RATE: 19 BRPM | DIASTOLIC BLOOD PRESSURE: 63 MMHG

## 2025-08-27 DIAGNOSIS — R94.39 ABNORMAL STRESS TEST: ICD-10-CM

## 2025-08-27 LAB
BUN BLD-MCNC: 16 MG/DL (ref 8–26)
EGFR, POC: 73 ML/MIN/1.73M2
GLUCOSE BLD-MCNC: 139 MG/DL (ref 74–100)
HCT VFR BLD AUTO: 47 % (ref 41–53)
PLATELET # BLD AUTO: 323 K/UL (ref 138–453)
POC CREATININE: 1.1 MG/DL (ref 0.51–1.19)
POC HEMOGLOBIN (CALC): 15.9 G/DL (ref 13.5–17.5)
POTASSIUM BLD-SCNC: 3.9 MMOL/L (ref 3.5–4.5)
SODIUM BLD-SCNC: 143 MMOL/L (ref 138–146)

## 2025-08-27 PROCEDURE — 76937 US GUIDE VASCULAR ACCESS: CPT | Performed by: INTERNAL MEDICINE

## 2025-08-27 PROCEDURE — C1894 INTRO/SHEATH, NON-LASER: HCPCS | Performed by: INTERNAL MEDICINE

## 2025-08-27 PROCEDURE — 84132 ASSAY OF SERUM POTASSIUM: CPT

## 2025-08-27 PROCEDURE — C1769 GUIDE WIRE: HCPCS | Performed by: INTERNAL MEDICINE

## 2025-08-27 PROCEDURE — 7100000010 HC PHASE II RECOVERY - FIRST 15 MIN: Performed by: INTERNAL MEDICINE

## 2025-08-27 PROCEDURE — 82947 ASSAY GLUCOSE BLOOD QUANT: CPT

## 2025-08-27 PROCEDURE — 82565 ASSAY OF CREATININE: CPT

## 2025-08-27 PROCEDURE — 85049 AUTOMATED PLATELET COUNT: CPT

## 2025-08-27 PROCEDURE — 84520 ASSAY OF UREA NITROGEN: CPT

## 2025-08-27 PROCEDURE — 2709999900 HC NON-CHARGEABLE SUPPLY: Performed by: INTERNAL MEDICINE

## 2025-08-27 PROCEDURE — 6360000004 HC RX CONTRAST MEDICATION: Performed by: INTERNAL MEDICINE

## 2025-08-27 PROCEDURE — 2580000003 HC RX 258: Performed by: INTERNAL MEDICINE

## 2025-08-27 PROCEDURE — 93454 CORONARY ARTERY ANGIO S&I: CPT | Performed by: INTERNAL MEDICINE

## 2025-08-27 PROCEDURE — 84295 ASSAY OF SERUM SODIUM: CPT

## 2025-08-27 PROCEDURE — 85014 HEMATOCRIT: CPT

## 2025-08-27 PROCEDURE — 7100000011 HC PHASE II RECOVERY - ADDTL 15 MIN: Performed by: INTERNAL MEDICINE

## 2025-08-27 PROCEDURE — 6360000002 HC RX W HCPCS: Performed by: INTERNAL MEDICINE

## 2025-08-27 RX ORDER — ASPIRIN 325 MG
325 TABLET ORAL ONCE
Status: DISCONTINUED | OUTPATIENT
Start: 2025-08-27 | End: 2025-08-27 | Stop reason: HOSPADM

## 2025-08-27 RX ORDER — IOPAMIDOL 755 MG/ML
INJECTION, SOLUTION INTRAVASCULAR PRN
Status: DISCONTINUED | OUTPATIENT
Start: 2025-08-27 | End: 2025-08-27 | Stop reason: HOSPADM

## 2025-08-27 RX ORDER — FENTANYL CITRATE 50 UG/ML
INJECTION, SOLUTION INTRAMUSCULAR; INTRAVENOUS PRN
Status: DISCONTINUED | OUTPATIENT
Start: 2025-08-27 | End: 2025-08-27 | Stop reason: HOSPADM

## 2025-08-27 RX ORDER — MIDAZOLAM HYDROCHLORIDE 1 MG/ML
INJECTION, SOLUTION INTRAMUSCULAR; INTRAVENOUS PRN
Status: DISCONTINUED | OUTPATIENT
Start: 2025-08-27 | End: 2025-08-27 | Stop reason: HOSPADM

## 2025-08-27 RX ORDER — SODIUM CHLORIDE 9 MG/ML
INJECTION, SOLUTION INTRAVENOUS CONTINUOUS
Status: DISCONTINUED | OUTPATIENT
Start: 2025-08-27 | End: 2025-08-27 | Stop reason: HOSPADM

## 2025-08-27 RX ORDER — HEPARIN SODIUM 1000 [USP'U]/ML
INJECTION, SOLUTION INTRAVENOUS; SUBCUTANEOUS PRN
Status: DISCONTINUED | OUTPATIENT
Start: 2025-08-27 | End: 2025-08-27 | Stop reason: HOSPADM

## 2025-08-27 RX ADMIN — SODIUM CHLORIDE: 0.9 INJECTION, SOLUTION INTRAVENOUS at 10:03

## 2025-09-05 PROBLEM — I25.82 CHRONIC TOTAL OCCLUSION OF CORONARY ARTERY: Status: ACTIVE | Noted: 2025-09-02

## (undated) DEVICE — KIT INTRO 6FR L10CM MINI WIRE L45CM DIA0.021IN NDL 21GA ANT

## (undated) DEVICE — MASTISOL ADHESIVE LIQ 2/3ML

## (undated) DEVICE — Device

## (undated) DEVICE — SOLUTION IRRIG 1000ML 0.9% SOD CHL USP POUR PLAS BTL

## (undated) DEVICE — SUTURE MCRYL SZ 5-0 L18IN ABSRB UD L19MM PS-2 3/8 CIR PRIM Y495G

## (undated) DEVICE — GLOVE ORANGE PI 7 1/2   MSG9075

## (undated) DEVICE — ELECTRODE ELECSURG NDL 2.8 INX7.2 CM COAT INSUL EDGE

## (undated) DEVICE — SUTURE MCRYL + SZ 4-0 L27IN ABSRB UD L19MM PS-2 3/8 CIR MCP426H

## (undated) DEVICE — INTENDED FOR TISSUE SEPARATION, AND OTHER PROCEDURES THAT REQUIRE A SHARP SURGICAL BLADE TO PUNCTURE OR CUT.: Brand: BARD-PARKER ® CARBON RIB-BACK BLADES

## (undated) DEVICE — SUTURE PROL SZ 6-0 L18IN NONABSORBABLE BLU P-3 L13MM 3/8 8695G

## (undated) DEVICE — BAND COMPR L24CM REG CLR PLAS HEMSTAT EXT HK AND LOOP RETEN

## (undated) DEVICE — ELECTRODE PT RET AD L9FT HI MOIST COND ADH HYDRGEL CORDED

## (undated) DEVICE — TRAY SURG CUST CRD CATH TOLEDO

## (undated) DEVICE — MHPB HEAD AND NECK  PACK: Brand: MEDLINE INDUSTRIES, INC.

## (undated) DEVICE — NEEDLE HYPO 25GA L1.5IN BLU POLYPR HUB S STL REG BVL STR

## (undated) DEVICE — PREMIUM DRY TRAY LF: Brand: MEDLINE INDUSTRIES, INC.

## (undated) DEVICE — STRIP,CLOSURE,WOUND,MEDI-STRIP,1/4X3: Brand: MEDLINE

## (undated) DEVICE — CATHETER ANGIO JL3.5 0.056 INX6 FRX100 CM THRULUMEN EXPO

## (undated) DEVICE — SUTURE MCRYL + SZ 6 0 L18IN ABSRB UD P 3 L13MM 3 8 CIR REV MCP492G

## (undated) DEVICE — GUIDEWIRE VASC FIX COR J TIP 3MM .035INX260CM

## (undated) DEVICE — SOLUTION SCRB 4OZ 7.5% POVIDONE IOD ANTIMIC BTL

## (undated) DEVICE — SUTURE PROL SZ 4-0 L18IN NONABSORBABLE BLU L13MM P-3 3/8 8699G